# Patient Record
Sex: FEMALE | Race: WHITE | Employment: PART TIME | URBAN - METROPOLITAN AREA
[De-identification: names, ages, dates, MRNs, and addresses within clinical notes are randomized per-mention and may not be internally consistent; named-entity substitution may affect disease eponyms.]

---

## 2017-01-23 ENCOUNTER — HOSPITAL ENCOUNTER (OUTPATIENT)
Dept: DIABETES SERVICES | Age: 32
Discharge: HOME | End: 2017-01-23

## 2017-01-23 DIAGNOSIS — O24.410 DIET CONTROLLED GESTATIONAL DIABETES MELLITUS (GDM) IN THIRD TRIMESTER: Primary | ICD-10-CM

## 2017-01-23 RX ORDER — LANOLIN ALCOHOL/MO/W.PET/CERES
50 CREAM (GRAM) TOPICAL DAILY
Status: ON HOLD | COMMUNITY
End: 2017-04-16 | Stop reason: HOSPADM

## 2017-01-23 ASSESSMENT — PATIENT HEALTH QUESTIONNAIRE - PHQ9
2. FEELING DOWN, DEPRESSED OR HOPELESS: 0
SUM OF ALL RESPONSES TO PHQ9 QUESTIONS 1 & 2: 0
SUM OF ALL RESPONSES TO PHQ QUESTIONS 1-9: 0
1. LITTLE INTEREST OR PLEASURE IN DOING THINGS: 0

## 2017-02-06 ENCOUNTER — HOSPITAL ENCOUNTER (OUTPATIENT)
Dept: DIABETES SERVICES | Age: 32
Discharge: HOME | End: 2017-02-06

## 2017-02-06 DIAGNOSIS — O24.410 DIET CONTROLLED GESTATIONAL DIABETES MELLITUS (GDM) IN THIRD TRIMESTER: Primary | ICD-10-CM

## 2017-02-23 ENCOUNTER — HOSPITAL ENCOUNTER (OUTPATIENT)
Dept: OBGYN | Age: 32
Discharge: HOME | End: 2017-02-28
Admitting: OBSTETRICS & GYNECOLOGY

## 2017-03-13 ENCOUNTER — HOSPITAL ENCOUNTER (OUTPATIENT)
Dept: OBGYN | Age: 32
Discharge: HOME | End: 2017-03-31
Admitting: OBSTETRICS & GYNECOLOGY

## 2017-03-29 ENCOUNTER — HOSPITAL ENCOUNTER (OUTPATIENT)
Dept: ULTRASOUND IMAGING | Age: 32
Discharge: HOME | End: 2017-03-29
Attending: OBSTETRICS & GYNECOLOGY | Admitting: OBSTETRICS & GYNECOLOGY

## 2017-03-29 DIAGNOSIS — O09.93 SUPERVISION OF HIGH RISK PREGNANCY IN THIRD TRIMESTER: ICD-10-CM

## 2017-03-29 DIAGNOSIS — O24.410 GESTATIONAL DIABETES MELLITUS, CLASS A1: ICD-10-CM

## 2017-05-26 ENCOUNTER — NURSE ONLY (OUTPATIENT)
Dept: OBGYN CLINIC | Age: 32
End: 2017-05-26

## 2017-05-26 VITALS
DIASTOLIC BLOOD PRESSURE: 72 MMHG | HEART RATE: 82 BPM | SYSTOLIC BLOOD PRESSURE: 110 MMHG | BODY MASS INDEX: 43.38 KG/M2 | TEMPERATURE: 98.3 F | WEIGHT: 277 LBS

## 2017-05-26 DIAGNOSIS — Z98.891 S/P CESAREAN SECTION: Primary | ICD-10-CM

## 2017-05-26 PROCEDURE — 99024 POSTOP FOLLOW-UP VISIT: CPT | Performed by: OBSTETRICS & GYNECOLOGY

## 2017-05-26 RX ORDER — CEPHALEXIN 500 MG/1
500 CAPSULE ORAL 4 TIMES DAILY
Qty: 28 CAPSULE | Refills: 0 | Status: SHIPPED | OUTPATIENT
Start: 2017-05-26 | End: 2017-06-24 | Stop reason: ALTCHOICE

## 2017-07-17 ENCOUNTER — SURG/PROC ORDERS (OUTPATIENT)
Dept: SURGERY | Age: 32
End: 2017-07-17

## 2017-07-17 ENCOUNTER — INITIAL CONSULT (OUTPATIENT)
Dept: SURGERY | Age: 32
End: 2017-07-17

## 2017-07-17 VITALS
SYSTOLIC BLOOD PRESSURE: 120 MMHG | HEIGHT: 67 IN | BODY MASS INDEX: 43.16 KG/M2 | WEIGHT: 275 LBS | DIASTOLIC BLOOD PRESSURE: 72 MMHG

## 2017-07-17 DIAGNOSIS — K80.20 GALLSTONES: Primary | ICD-10-CM

## 2017-07-17 PROCEDURE — 99243 OFF/OP CNSLTJ NEW/EST LOW 30: CPT | Performed by: SURGERY

## 2017-07-17 RX ORDER — SODIUM CHLORIDE 0.9 % (FLUSH) 0.9 %
10 SYRINGE (ML) INJECTION PRN
Status: CANCELLED | OUTPATIENT
Start: 2017-07-17

## 2017-07-17 RX ORDER — SODIUM CHLORIDE 0.9 % (FLUSH) 0.9 %
10 SYRINGE (ML) INJECTION EVERY 12 HOURS SCHEDULED
Status: CANCELLED | OUTPATIENT
Start: 2017-07-17

## 2017-07-18 ENCOUNTER — TELEPHONE (OUTPATIENT)
Dept: SURGERY | Age: 32
End: 2017-07-18

## 2017-07-20 ASSESSMENT — ENCOUNTER SYMPTOMS: SHORTNESS OF BREATH: 0

## 2017-07-21 ENCOUNTER — HOSPITAL ENCOUNTER (OUTPATIENT)
Dept: SURGERY | Age: 32
Discharge: HOME | End: 2017-07-21
Attending: SURGERY | Admitting: SURGERY

## 2017-07-21 VITALS
RESPIRATION RATE: 16 BRPM | HEIGHT: 67 IN | BODY MASS INDEX: 43.16 KG/M2 | TEMPERATURE: 97 F | SYSTOLIC BLOOD PRESSURE: 121 MMHG | OXYGEN SATURATION: 100 % | WEIGHT: 275 LBS | HEART RATE: 75 BPM | DIASTOLIC BLOOD PRESSURE: 92 MMHG

## 2017-07-21 LAB — PREGNANCY, URINE: NEGATIVE

## 2017-07-21 PROCEDURE — 47562 LAPAROSCOPIC CHOLECYSTECTOMY: CPT | Performed by: SURGERY

## 2017-07-21 RX ORDER — SODIUM CHLORIDE 0.9 % (FLUSH) 0.9 %
10 SYRINGE (ML) INJECTION PRN
Status: DISCONTINUED | OUTPATIENT
Start: 2017-07-21 | End: 2017-07-22 | Stop reason: HOSPADM

## 2017-07-21 RX ORDER — ONDANSETRON 2 MG/ML
4 INJECTION INTRAMUSCULAR; INTRAVENOUS
Status: ACTIVE | OUTPATIENT
Start: 2017-07-21 | End: 2017-07-21

## 2017-07-21 RX ORDER — KETOROLAC TROMETHAMINE 30 MG/ML
30 INJECTION, SOLUTION INTRAMUSCULAR; INTRAVENOUS
Status: ACTIVE | OUTPATIENT
Start: 2017-07-21 | End: 2017-07-21

## 2017-07-21 RX ORDER — HYDROMORPHONE HCL 110MG/55ML
0.25 PATIENT CONTROLLED ANALGESIA SYRINGE INTRAVENOUS EVERY 5 MIN PRN
Status: DISCONTINUED | OUTPATIENT
Start: 2017-07-21 | End: 2017-07-22 | Stop reason: HOSPADM

## 2017-07-21 RX ORDER — LIDOCAINE HYDROCHLORIDE 10 MG/ML
1 INJECTION, SOLUTION EPIDURAL; INFILTRATION; INTRACAUDAL; PERINEURAL
Status: COMPLETED | OUTPATIENT
Start: 2017-07-21 | End: 2017-07-21

## 2017-07-21 RX ORDER — KETOROLAC TROMETHAMINE 30 MG/ML
INJECTION, SOLUTION INTRAMUSCULAR; INTRAVENOUS
Status: COMPLETED
Start: 2017-07-21 | End: 2017-07-21

## 2017-07-21 RX ORDER — OXYCODONE HYDROCHLORIDE 5 MG/1
5-10 TABLET ORAL EVERY 4 HOURS PRN
Qty: 30 TABLET | Refills: 0 | Status: ON HOLD | OUTPATIENT
Start: 2017-07-21 | End: 2017-08-04 | Stop reason: HOSPADM

## 2017-07-21 RX ORDER — KETOROLAC TROMETHAMINE 30 MG/ML
30 INJECTION, SOLUTION INTRAMUSCULAR; INTRAVENOUS ONCE
Status: COMPLETED | OUTPATIENT
Start: 2017-07-21 | End: 2017-07-21

## 2017-07-21 RX ORDER — SODIUM CHLORIDE, SODIUM LACTATE, POTASSIUM CHLORIDE, CALCIUM CHLORIDE 600; 310; 30; 20 MG/100ML; MG/100ML; MG/100ML; MG/100ML
INJECTION, SOLUTION INTRAVENOUS CONTINUOUS
Status: DISCONTINUED | OUTPATIENT
Start: 2017-07-21 | End: 2017-07-22 | Stop reason: HOSPADM

## 2017-07-21 RX ORDER — LABETALOL HYDROCHLORIDE 5 MG/ML
5 INJECTION, SOLUTION INTRAVENOUS EVERY 10 MIN PRN
Status: DISCONTINUED | OUTPATIENT
Start: 2017-07-21 | End: 2017-07-22 | Stop reason: HOSPADM

## 2017-07-21 RX ORDER — MEPERIDINE HYDROCHLORIDE 25 MG/ML
12.5 INJECTION INTRAMUSCULAR; INTRAVENOUS; SUBCUTANEOUS EVERY 5 MIN PRN
Status: DISCONTINUED | OUTPATIENT
Start: 2017-07-21 | End: 2017-07-22 | Stop reason: HOSPADM

## 2017-07-21 RX ORDER — MORPHINE SULFATE 4 MG/ML
2 INJECTION, SOLUTION INTRAMUSCULAR; INTRAVENOUS EVERY 5 MIN PRN
Status: DISCONTINUED | OUTPATIENT
Start: 2017-07-21 | End: 2017-07-22 | Stop reason: HOSPADM

## 2017-07-21 RX ORDER — HYDRALAZINE HYDROCHLORIDE 20 MG/ML
5 INJECTION INTRAMUSCULAR; INTRAVENOUS
Status: DISCONTINUED | OUTPATIENT
Start: 2017-07-21 | End: 2017-07-22 | Stop reason: HOSPADM

## 2017-07-21 RX ORDER — SODIUM CHLORIDE 0.9 % (FLUSH) 0.9 %
10 SYRINGE (ML) INJECTION EVERY 12 HOURS SCHEDULED
Status: DISCONTINUED | OUTPATIENT
Start: 2017-07-21 | End: 2017-07-22 | Stop reason: HOSPADM

## 2017-07-21 RX ORDER — MORPHINE SULFATE 4 MG/ML
1 INJECTION, SOLUTION INTRAMUSCULAR; INTRAVENOUS EVERY 5 MIN PRN
Status: DISCONTINUED | OUTPATIENT
Start: 2017-07-21 | End: 2017-07-22 | Stop reason: HOSPADM

## 2017-07-21 RX ORDER — DIPHENHYDRAMINE HYDROCHLORIDE 50 MG/ML
12.5 INJECTION INTRAMUSCULAR; INTRAVENOUS
Status: ACTIVE | OUTPATIENT
Start: 2017-07-21 | End: 2017-07-21

## 2017-07-21 RX ORDER — HYDROMORPHONE HCL 110MG/55ML
0.5 PATIENT CONTROLLED ANALGESIA SYRINGE INTRAVENOUS EVERY 5 MIN PRN
Status: COMPLETED | OUTPATIENT
Start: 2017-07-21 | End: 2017-07-21

## 2017-07-21 RX ORDER — OXYCODONE HYDROCHLORIDE AND ACETAMINOPHEN 5; 325 MG/1; MG/1
1 TABLET ORAL PRN
Status: COMPLETED | OUTPATIENT
Start: 2017-07-21 | End: 2017-07-21

## 2017-07-21 RX ORDER — OXYCODONE HYDROCHLORIDE AND ACETAMINOPHEN 5; 325 MG/1; MG/1
2 TABLET ORAL PRN
Status: COMPLETED | OUTPATIENT
Start: 2017-07-21 | End: 2017-07-21

## 2017-07-21 RX ADMIN — MORPHINE SULFATE 2 MG: 4 INJECTION, SOLUTION INTRAMUSCULAR; INTRAVENOUS at 12:13

## 2017-07-21 RX ADMIN — Medication 0.5 MG: at 11:44

## 2017-07-21 RX ADMIN — Medication 0.5 MG: at 11:54

## 2017-07-21 RX ADMIN — MORPHINE SULFATE 2 MG: 4 INJECTION, SOLUTION INTRAMUSCULAR; INTRAVENOUS at 12:06

## 2017-07-21 RX ADMIN — Medication 0.5 MG: at 11:39

## 2017-07-21 RX ADMIN — Medication 0.5 MG: at 12:00

## 2017-07-21 RX ADMIN — SODIUM CHLORIDE, SODIUM LACTATE, POTASSIUM CHLORIDE, CALCIUM CHLORIDE: 600; 310; 30; 20 INJECTION, SOLUTION INTRAVENOUS at 10:34

## 2017-07-21 RX ADMIN — OXYCODONE HYDROCHLORIDE AND ACETAMINOPHEN 2 TABLET: 5; 325 TABLET ORAL at 12:20

## 2017-07-21 RX ADMIN — KETOROLAC TROMETHAMINE 30 MG: 30 INJECTION, SOLUTION INTRAMUSCULAR; INTRAVENOUS at 12:44

## 2017-07-21 RX ADMIN — LIDOCAINE HYDROCHLORIDE 0.1 ML: 10 INJECTION, SOLUTION EPIDURAL; INFILTRATION; INTRACAUDAL; PERINEURAL at 10:34

## 2017-07-21 ASSESSMENT — PAIN DESCRIPTION - PAIN TYPE
TYPE: SURGICAL PAIN

## 2017-07-21 ASSESSMENT — PAIN SCALES - GENERAL
PAINLEVEL_OUTOF10: 8
PAINLEVEL_OUTOF10: 8
PAINLEVEL_OUTOF10: 3
PAINLEVEL_OUTOF10: 8
PAINLEVEL_OUTOF10: 4
PAINLEVEL_OUTOF10: 8
PAINLEVEL_OUTOF10: 8
PAINLEVEL_OUTOF10: 7
PAINLEVEL_OUTOF10: 8
PAINLEVEL_OUTOF10: 7
PAINLEVEL_OUTOF10: 4

## 2017-07-21 ASSESSMENT — PAIN DESCRIPTION - ONSET: ONSET: GRADUAL

## 2017-07-21 ASSESSMENT — PAIN DESCRIPTION - PROGRESSION
CLINICAL_PROGRESSION: GRADUALLY IMPROVING

## 2017-07-21 ASSESSMENT — PAIN DESCRIPTION - FREQUENCY
FREQUENCY: INTERMITTENT

## 2017-07-21 ASSESSMENT — PAIN DESCRIPTION - DESCRIPTORS
DESCRIPTORS: ACHING;BURNING

## 2017-07-21 ASSESSMENT — PAIN - FUNCTIONAL ASSESSMENT: PAIN_FUNCTIONAL_ASSESSMENT: 0-10

## 2017-07-21 ASSESSMENT — PAIN DESCRIPTION - LOCATION
LOCATION: ABDOMEN
LOCATION: ABDOMEN

## 2017-07-31 ENCOUNTER — TELEPHONE (OUTPATIENT)
Dept: SURGERY | Age: 32
End: 2017-07-31

## 2017-08-08 ENCOUNTER — HOSPITAL ENCOUNTER (OUTPATIENT)
Dept: OTHER | Age: 32
Discharge: HOME | End: 2017-08-08
Attending: SURGERY | Admitting: SURGERY

## 2017-08-08 ENCOUNTER — OFFICE VISIT (OUTPATIENT)
Dept: SURGERY | Age: 32
End: 2017-08-08

## 2017-08-08 VITALS
BODY MASS INDEX: 42.22 KG/M2 | SYSTOLIC BLOOD PRESSURE: 122 MMHG | WEIGHT: 269 LBS | HEIGHT: 67 IN | DIASTOLIC BLOOD PRESSURE: 74 MMHG

## 2017-08-08 DIAGNOSIS — R79.89 ABNORMAL LFTS: ICD-10-CM

## 2017-08-08 DIAGNOSIS — Z98.890 POST-OPERATIVE STATE: Primary | ICD-10-CM

## 2017-08-08 LAB
ALBUMIN SERPL-MCNC: 4.3 G/DL (ref 3.4–5)
ALP BLD-CCNC: 253 U/L (ref 40–129)
ALT SERPL-CCNC: 93 U/L (ref 10–40)
AST SERPL-CCNC: 27 U/L (ref 15–37)
BILIRUB SERPL-MCNC: 1.1 MG/DL (ref 0–1)
BILIRUBIN DIRECT: 0.5 MG/DL (ref 0–0.3)
BILIRUBIN, INDIRECT: 0.6 MG/DL (ref 0–1)
TOTAL PROTEIN: 7.8 G/DL (ref 6.4–8.2)

## 2017-08-08 PROCEDURE — 99024 POSTOP FOLLOW-UP VISIT: CPT | Performed by: SURGERY

## 2017-08-10 ENCOUNTER — TELEPHONE (OUTPATIENT)
Dept: SURGERY | Age: 32
End: 2017-08-10

## 2018-11-12 ENCOUNTER — OFFICE VISIT (OUTPATIENT)
Dept: ORTHOPEDIC SURGERY | Age: 33
End: 2018-11-12
Payer: COMMERCIAL

## 2018-11-12 VITALS
HEART RATE: 88 BPM | BODY MASS INDEX: 42.44 KG/M2 | SYSTOLIC BLOOD PRESSURE: 115 MMHG | HEIGHT: 68 IN | DIASTOLIC BLOOD PRESSURE: 71 MMHG | WEIGHT: 280 LBS

## 2018-11-12 DIAGNOSIS — M65.4 TENOSYNOVITIS, DE QUERVAIN: ICD-10-CM

## 2018-11-12 DIAGNOSIS — M25.532 LEFT WRIST PAIN: Primary | ICD-10-CM

## 2018-11-12 PROCEDURE — L3908 WHO COCK-UP NONMOLDE PRE OTS: HCPCS | Performed by: PHYSICIAN ASSISTANT

## 2018-11-12 PROCEDURE — 99202 OFFICE O/P NEW SF 15 MIN: CPT | Performed by: PHYSICIAN ASSISTANT

## 2018-11-12 RX ORDER — ESCITALOPRAM OXALATE 10 MG/1
10 TABLET ORAL DAILY
COMMUNITY

## 2018-11-20 ENCOUNTER — OFFICE VISIT (OUTPATIENT)
Dept: ORTHOPEDIC SURGERY | Age: 33
End: 2018-11-20
Payer: COMMERCIAL

## 2018-11-20 VITALS — WEIGHT: 279.98 LBS | BODY MASS INDEX: 42.43 KG/M2 | HEIGHT: 68 IN

## 2018-11-20 DIAGNOSIS — M65.4 DE QUERVAIN'S DISEASE (RADIAL STYLOID TENOSYNOVITIS): Primary | ICD-10-CM

## 2018-11-20 PROCEDURE — 20551 NJX 1 TENDON ORIGIN/INSJ: CPT | Performed by: ORTHOPAEDIC SURGERY

## 2018-11-20 PROCEDURE — 99243 OFF/OP CNSLTJ NEW/EST LOW 30: CPT | Performed by: ORTHOPAEDIC SURGERY

## 2018-11-20 NOTE — PROGRESS NOTES
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 Patient developed tenderness over the dorsal radial thumb and wrist approximately 10 days. she has not had any specific traumatic injury or numbness and tingling. .Pain is persistent, typically moderate in intensity, and is exacerbated by with thumb extension or pinch. No triggering of the thumb. Pain causes weakness. No numbness of the fingers. Patient was seen in after hours clinic. Appropriately given a brace.   A specialty hand and upper extremity consultation was requested by my colleague DONITA Craig for evaluation treatment of left wrist pain    Medical History:  Past Medical History:   Diagnosis Date    Abnormal Pap smear of cervix     Anemia     Anxiety     Crohn's colitis ([facility] [address]     Depression     Gestational diabetes     Herniated disc     SAB (spontaneous )     x2     Past Surgical History:   Procedure Laterality Date     SECTION      CHOLECYSTECTOMY, LAPAROSCOPIC N/A 2017    ECTOPIC PREGNANCY SURGERY      ECTOPIC PREGNANCY SURGERY  2008    ERCP       Family History   Problem Relation Age of Onset    Arthritis Father     High Blood Pressure Father     Diabetes Paternal Aunt     Mental Retardation Paternal Aunt     Atrial Fibrillation Maternal Grandmother     High Blood Pressure Maternal Grandmother     Obesity Maternal Grandmother     Stroke Maternal Grandmother     Early Death Maternal Grandfather     Heart Disease Maternal Grandfather     High Blood Pressure Maternal Grandfather     Obesity Maternal Grandfather     Depression Paternal Grandmother     Heart Disease Paternal Grandmother     High Blood Pressure Paternal Grandmother     Kidney Disease

## 2019-03-07 ENCOUNTER — HOSPITAL ENCOUNTER (EMERGENCY)
Age: 34
Discharge: HOME | End: 2019-03-07
Payer: COMMERCIAL

## 2019-03-07 ENCOUNTER — APPOINTMENT (OUTPATIENT)
Dept: GENERAL RADIOLOGY | Age: 34
End: 2019-03-07
Payer: COMMERCIAL

## 2019-03-07 ENCOUNTER — NURSE TRIAGE (OUTPATIENT)
Dept: OTHER | Facility: CLINIC | Age: 34
End: 2019-03-07

## 2019-03-07 VITALS
SYSTOLIC BLOOD PRESSURE: 115 MMHG | WEIGHT: 275 LBS | HEIGHT: 67 IN | HEART RATE: 81 BPM | RESPIRATION RATE: 16 BRPM | TEMPERATURE: 97.2 F | BODY MASS INDEX: 43.16 KG/M2 | DIASTOLIC BLOOD PRESSURE: 68 MMHG | OXYGEN SATURATION: 99 %

## 2019-03-07 DIAGNOSIS — R07.81 RIB PAIN ON RIGHT SIDE: Primary | ICD-10-CM

## 2019-03-07 DIAGNOSIS — S40.021A CONTUSION OF MULTIPLE SITES OF RIGHT SHOULDER AND UPPER ARM, INITIAL ENCOUNTER: ICD-10-CM

## 2019-03-07 DIAGNOSIS — S40.011A CONTUSION OF MULTIPLE SITES OF RIGHT SHOULDER AND UPPER ARM, INITIAL ENCOUNTER: ICD-10-CM

## 2019-03-07 PROCEDURE — 99283 EMERGENCY DEPT VISIT LOW MDM: CPT

## 2019-03-07 PROCEDURE — 6370000000 HC RX 637 (ALT 250 FOR IP): Performed by: NURSE PRACTITIONER

## 2019-03-07 PROCEDURE — 73030 X-RAY EXAM OF SHOULDER: CPT

## 2019-03-07 RX ORDER — DIAZEPAM 5 MG/1
5 TABLET ORAL ONCE
Status: COMPLETED | OUTPATIENT
Start: 2019-03-07 | End: 2019-03-07

## 2019-03-07 RX ORDER — IBUPROFEN 800 MG/1
800 TABLET ORAL ONCE
Status: COMPLETED | OUTPATIENT
Start: 2019-03-07 | End: 2019-03-07

## 2019-03-07 RX ADMIN — DIAZEPAM 5 MG: 5 TABLET ORAL at 21:37

## 2019-03-07 RX ADMIN — IBUPROFEN 800 MG: 800 TABLET, FILM COATED ORAL at 21:01

## 2019-03-07 ASSESSMENT — PAIN SCALES - GENERAL
PAINLEVEL_OUTOF10: 5
PAINLEVEL_OUTOF10: 10

## 2019-03-11 ENCOUNTER — OFFICE VISIT (OUTPATIENT)
Dept: ORTHOPEDIC SURGERY | Age: 34
End: 2019-03-11
Payer: COMMERCIAL

## 2019-03-11 VITALS
SYSTOLIC BLOOD PRESSURE: 124 MMHG | HEART RATE: 78 BPM | DIASTOLIC BLOOD PRESSURE: 74 MMHG | HEIGHT: 67 IN | WEIGHT: 274.91 LBS | BODY MASS INDEX: 43.15 KG/M2

## 2019-03-11 DIAGNOSIS — S40.011A CONTUSION OF RIGHT SHOULDER, INITIAL ENCOUNTER: ICD-10-CM

## 2019-03-11 DIAGNOSIS — M25.511 ACUTE PAIN OF RIGHT SHOULDER: Primary | ICD-10-CM

## 2019-03-11 PROCEDURE — 99203 OFFICE O/P NEW LOW 30 MIN: CPT | Performed by: ORTHOPAEDIC SURGERY

## 2019-03-11 RX ORDER — PYRIDOXINE HCL (VITAMIN B6) 50 MG
TABLET ORAL
COMMUNITY
End: 2019-03-11

## 2019-03-11 RX ORDER — LORATADINE 10 MG/1
10 CAPSULE, LIQUID FILLED ORAL DAILY
COMMUNITY
Start: 2016-12-12

## 2019-03-13 ENCOUNTER — TELEPHONE (OUTPATIENT)
Dept: ORTHOPEDIC SURGERY | Age: 34
End: 2019-03-13

## 2019-04-09 ENCOUNTER — OFFICE VISIT (OUTPATIENT)
Dept: ORTHOPEDIC SURGERY | Age: 34
End: 2019-04-09
Payer: COMMERCIAL

## 2019-04-09 VITALS — HEIGHT: 67 IN | WEIGHT: 274.91 LBS | BODY MASS INDEX: 43.15 KG/M2

## 2019-04-09 DIAGNOSIS — M25.511 ACUTE PAIN OF RIGHT SHOULDER: Primary | ICD-10-CM

## 2019-04-09 PROCEDURE — 99213 OFFICE O/P EST LOW 20 MIN: CPT | Performed by: ORTHOPAEDIC SURGERY

## 2020-02-13 ENCOUNTER — OFFICE VISIT (OUTPATIENT)
Dept: ORTHOPEDIC SURGERY | Age: 35
End: 2020-02-13
Payer: COMMERCIAL

## 2020-02-13 VITALS — HEIGHT: 67 IN | WEIGHT: 274.91 LBS | BODY MASS INDEX: 43.15 KG/M2

## 2020-02-13 PROCEDURE — 99213 OFFICE O/P EST LOW 20 MIN: CPT | Performed by: PHYSICIAN ASSISTANT

## 2020-02-13 PROCEDURE — L4361 PNEUMA/VAC WALK BOOT PRE OTS: HCPCS | Performed by: PHYSICIAN ASSISTANT

## 2020-02-14 ENCOUNTER — TELEPHONE (OUTPATIENT)
Dept: ORTHOPEDIC SURGERY | Age: 35
End: 2020-02-14

## 2020-02-14 NOTE — PROGRESS NOTES
visit. Medical History:   Past Medical History:   Diagnosis Date    Abnormal Pap smear of cervix     Anemia     Anxiety     Crohn's colitis ([address]     Depression     Gestational diabetes     Herniated disc     SAB (spontaneous )     x2     Allergies: Allergies   Allergen Reactions    Doxycycline Diarrhea     Problem List:    Patient Active Problem List   Diagnosis    Pneumonia    SOB (shortness of breath)    Anxiety disorder    Calculus of gallbladder with chronic cholecystitis without obstruction    Abnormal LFTs    Acute hepatitis    Tenosynovitis, de Quervain       Review of Systems:  All systems were reviewed on 2020 and were negative except as indicated on the ROS form attached to this encounter (or located in the Media tab). Vital Signs:  Ht 5' 7.01\" (1.702 m)   Wt 274 lb 14.6 oz (124.7 kg)   BMI 43.05 kg/m²     General Exam:  Constitutional: Patient is adequately groomed with no evidence of malnutrition  Mental Status: The patient is oriented to time, place and person. The patient's mood and affect are appropriate. Neurological: The patient has good coordination. There is no weakness or sensory deficit. Left heel/ankle Examination:   Inspection: Today's inspection of the left heel and ankle reveals the skin to be intact with no obvious swelling or erythema. Palpation: She is diffusely tender to palpation over the mid substance of the Achilles into the insertion at the calcaneus    Range of motion: She is able to fully plantarflex, rakel, invert, but dorsiflexion increases her pain. She is only able to dorsiflex to neutral.    Strength: Strength is decreased to resistive extension and flexion    Special tests:  There are no palpable defects and's are negative Gardiner's    Skin: There are no rashes, ulcerations or lesions    Gait: With a limp favoring the left lower extremity    Distal neurovascular is grossly intact  Radiology:  X-rays obtained and reviewed in office:  Views: AP, lateral, oblique left ankle  Location(s): Left ankle  Impression: There are no acute or subacute fractures noted and there is minimal calcaneal spurring noted    Assessment:  Left Achilles tendinitis    Impression:   Encounter Diagnoses   Name Primary?  Pain     Achilles tendinitis of left lower extremity Yes       Office Procedures:  Orders Placed This Encounter   Procedures    XR ANKLE LEFT (MIN 3 VIEWS)     Standing Status:   Future     Number of Occurrences:   1     Standing Expiration Date:   3/13/2020    OTS FP Pneumatic Walking Boot Tall DJO     Patient was prescribed a Moore Getting Tall Walking Boot. The left ANKLE will require stabilization / immobilization from this semi-rigid / rigid orthosis to improve their function. The orthosis will assist in protecting the affected area, provide functional support and facilitate healing. The patient was educated and fit by a healthcare professional with expert knowledge and specialization in brace application while under the direct supervision of the physician. Verbal and written instructions for the use of and application of this item were provided. They were instructed to contact the office immediately should the brace result in increased pain, decreased sensation, increased swelling or worsening of the condition. Treatment Plan:  Patient was placed into a tall pneumatic walking boot that she is to wear with all her weightbearing activity. She was given heel cord stretching exercises that should be performed 2-3 times a day. She is also to take ibuprofen 800 mg 2-3 times a day with food. He will follow-up with Dr. Wilmar Hyde in 2 weeks. Jcarlos Adams PA-C    * Please note that some or all of this record was generated using voice recognition software. If there are any questions about the content of this document, please contact me as some errors in transcription may have occurred.

## 2020-02-27 ENCOUNTER — OFFICE VISIT (OUTPATIENT)
Dept: ORTHOPEDIC SURGERY | Age: 35
End: 2020-02-27
Payer: COMMERCIAL

## 2020-02-27 VITALS
DIASTOLIC BLOOD PRESSURE: 75 MMHG | HEIGHT: 67 IN | SYSTOLIC BLOOD PRESSURE: 125 MMHG | WEIGHT: 274.91 LBS | BODY MASS INDEX: 43.15 KG/M2 | HEART RATE: 75 BPM

## 2020-02-27 PROCEDURE — 99203 OFFICE O/P NEW LOW 30 MIN: CPT | Performed by: PODIATRIST

## 2020-02-27 NOTE — PROGRESS NOTES
HISTORY OF PRESENT ILLNESS:  This is an initial visit for a 79-year-old female with a chief complaint of pain to the left Achilles tendon. She began having pain around Thanksgiving of last year. About a month ago she was moving some furniture when the pain became much more consistent. She cannot recall any direct injury. The pain is sharp but can be dull at times with both activity and direct pressure. The only real relief is experienced with rest and taking off shoes. FAMILY HISTORY: Documented in chart. SOCIAL HISTORY: Documented in chart. REVIEW OF SYSTEMS: The patient denies any fever, chills, or night sweats. The patient also denies developing any type of rash. The patient denies any problems with cardiovascular, pulmonary, gastrointestinal, neurologic, urologic, genitourinary, psychiatric, dermatologic, and HEENT systems. Family History, Social History, and Review of Systems were reviewed from patient history form dated on 2/27/2020 and available in the patient's chart under the MEDIA tab. PHYSICAL EXAM:  The patient is alert and orientated x3. The majority of the palpable tenderness is directly over the Achilles tendon, just above the left heel. There is focal edema noted in this area. There is no pain to the right heel. There is a small prominence at the posterior aspect of the left heel with some mild erythema. There is minimal pain of the Achilles tendon insertion itself. There is no palpable deficit and Gardiner's test is negative for a complete rupture. There is full-strength with plantar flexion at the ankle. Pedal pulses are palpable, bilateral.  The sensation is grossly intact, bilateral.    X-RAYS: 3 nonweightbearing x-ray views of the left ankle were reviewed. These demonstrate a prominent posterior heel spur. No acute fracture or periosteal reaction is noted. ASSESSMENT:  Achilles Tendinitis and Tenosynovitis, left.       PLAN:  I educated the patient on the pathology and its treatment options. I advised her to use the boot full-time. Work restrictions were given. Walking will be allowed as tolerated but overall activity should be decreased. The importance of rest in this condition was emphasized. The chronic and recurrent nature of the painful episodes with this condition was discussed. Both short-term and long-term treatments were discussed. At this point, I only recommend a trial of conservative treatment and the patient agrees with the treatment plan. I will see them back in approximately 3 weeks.

## 2020-02-27 NOTE — LETTER
[facility] [id number]  Phone: [phone]  Fax: [phone]    Rishabh Shu        February 27, 2020     Patient: Francheska Xavier   YOB: 1985   Date of Visit: 2/27/2020       To Whom It May Concern: It is my medical opinion that Triston Kay may return to work on 2/27/20. Must wear boot for 3 weeks. If you have any questions or concerns, please don't hesitate to call.     Sincerely,        Janee Wilson DPM

## 2020-03-17 ENCOUNTER — OFFICE VISIT (OUTPATIENT)
Dept: ORTHOPEDIC SURGERY | Age: 35
End: 2020-03-17
Payer: COMMERCIAL

## 2020-03-17 PROCEDURE — 99213 OFFICE O/P EST LOW 20 MIN: CPT | Performed by: PODIATRIST

## 2020-03-17 RX ORDER — DEXAMETHASONE SODIUM PHOSPHATE 4 MG/ML
INJECTION, SOLUTION INTRA-ARTICULAR; INTRALESIONAL; INTRAMUSCULAR; INTRAVENOUS; SOFT TISSUE
Qty: 30 ML | Refills: 0 | Status: SHIPPED | OUTPATIENT
Start: 2020-03-17 | End: 2020-09-23

## 2020-03-17 NOTE — PROGRESS NOTES
HISTORY OF PRESENT ILLNESS:  This is a return visit for a patient with a chief complaint of left Achilles tendinitis. Unfortunately over the last few weeks she became ill and was not able to use the boot consistently. She continues having pain to the back of her left ankle with activity. PHYSICAL EXAM:  The majority of the palpable tenderness remains at the posterior aspect of the left ankle over the Achilles tendon. There is focal edema over the Achilles tendon. There is no erythema, ecchymosis, or edema otherwise. There is minimal pain of the Achilles tendon insertion itself. There is no palpable deficit and Gardiner's test is negative for a complete rupture. There is full-strength with plantar flexion at the ankle. This is symmetrical.    Pedal pulses are palpable, bilateral.  The sensation is grossly intact, bilateral.      ASSESSMENT: Achilles Tendinitis and Tenosynovitis, left      PLAN: she will continue with the boot full-time for the next 2 weeks. I prescribed physical therapy to rehab the tendon. This will include iontophoresis with dexamethasone phosphate. I will see her back in 4 weeks.

## 2020-03-19 ENCOUNTER — HOSPITAL ENCOUNTER (OUTPATIENT)
Dept: PHYSICAL THERAPY | Age: 35
Setting detail: THERAPIES SERIES
Discharge: HOME | End: 2020-03-19
Payer: COMMERCIAL

## 2020-03-19 PROCEDURE — 97161 PT EVAL LOW COMPLEX 20 MIN: CPT

## 2020-03-19 PROCEDURE — 97033 APP MDLTY 1+IONTPHRSIS EA 15: CPT

## 2020-03-19 PROCEDURE — 97110 THERAPEUTIC EXERCISES: CPT

## 2020-03-19 NOTE — FLOWSHEET NOTE
Standing gastroc stretch 30\"x3  HEP   Standing Soleus stretch 30\"x3  HEP   Seated heel raises 15x2  HEP         Pt education on use of heel lift 3'                 Pt education on current condition, POC, expectations for therapy, and HEP. 10'                 Manual Intervention (31905)                                          NMR re-education (66271)   CUES NEEDED                                                         Therapeutic Activity (12191)                                                Therapeutic Exercise and NMR EXR  [x] (22958) Provided verbal/tactile cueing for activities related to strengthening, flexibility, endurance, ROM for improvements in LE, proximal hip, and core control with self care, mobility, lifting, ambulation.  [] (79001) Provided verbal/tactile cueing for activities related to improving balance, coordination, kinesthetic sense, posture, motor skill, proprioception  to assist with LE, proximal hip, and core control in self care, mobility, lifting, ambulation and eccentric single leg control.      NMR and Therapeutic Activities:    [] (30497 or 15963) Provided verbal/tactile cueing for activities related to improving balance, coordination, kinesthetic sense, posture, motor skill, proprioception and motor activation to allow for proper function of core, proximal hip and LE with self care and ADLs  [] (70766) Gait Re-education- Provided training and instruction to the patient for proper LE, core and proximal hip recruitment and positioning and eccentric body weight control with ambulation re-education including up and down stairs     Home Exercise Program:    [x] (52951) Reviewed/Progressed HEP activities related to strengthening, flexibility, endurance, ROM of core, proximal hip and LE for functional self-care, mobility, lifting and ambulation/stair navigation   [] (70208)Reviewed/Progressed HEP activities related to improving balance, coordination, kinesthetic sense, posture, motor skill, []? Progressing: []? Met: []? Not Met: []? Adjusted  2. Patient will demonstrate increased AROM to 10 degrees of DF to allow for proper joint functioning as indicated by patients Functional Deficits. []? Progressing: []? Met: []? Not Met: []? Adjusted  3. Patient will demonstrate an increase in Strength of L plantarflexors to 5/5 to allow for proper functional mobility as indicated by patients Functional Deficits. []? Progressing: []? Met: []? Not Met: []? Adjusted  4. Patient will return to all functional activities without increased symptoms or restriction. []? Progressing: []? Met: []? Not Met: []? Adjusted  5. Patient will be able to negotiate stairs without L achilles pain.(patient specific functional goal)    []? Progressing: []? Met: []? Not Met: []? Adjusted             Progression Towards Functional goals:  [] Patient is progressing as expected towards functional goals listed. [] Progression is slowed due to complexities listed. [] Progression has been slowed due to co-morbidities. [x] Plan just implemented, too soon to assess goals progression  [] Other:         Overall Progression Towards Functional goals/ Treatment Progress Update:  [] Patient is progressing as expected towards functional goals listed. [] Progression is slowed due to complexities/Impairments listed. [] Progression has been slowed due to co-morbidities.   [x] Plan just implemented, too soon to assess goals progression <30days   [] Goals require adjustment due to lack of progress  [] Patient is not progressing as expected and requires additional follow up with physician  [] Other    Prognosis for POC: [x] Good [] Fair  [] Poor      Patient requires continued skilled intervention: [x] Yes  [] No    Treatment/Activity Tolerance:  [x] Patient able to complete treatment  [] Patient limited by fatigue  [] Patient limited by pain    [] Patient limited by other medical complications  [] Other:     ASSESSMENT: See eval. PT educated patient on current condition, POC, expectations for therapy, and HEP. PT also discussed with patient use of Left heel lift as an alternative option to her boot. Iontophoresis with dexamethasone was applied today and patient was educated on use of iontophoresis and expectations. Due to the current healthcare climate PT discussed use of Telehealth for follow-up as well as updating of her HEP via [address] [place] [address]. Return to Play: (if applicable)   []  Stage 1: Intro to Strength   []  Stage 2: Return to Run and Strength   []  Stage 3: Return to Jump and Strength   []  Stage 4: Dynamic Strength and Agility   []  Stage 5: Sport Specific Training     []  Ready to Return to Play, Meets All Above Stages   []  Not Ready for Return to Sports   Comments:                               PLAN: See eval  [] Continue per plan of care [] Alter current plan (see comments above)  [x] Plan of care initiated [] Hold pending MD visit [] Discharge      Electronically signed by:  Felicia Molina, PT, DPT [id number]    Note: If patient does not return for scheduled/ recommended follow up visits, this note will serve as a discharge from care along with most recent update on progress.
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
## 2020-03-23 NOTE — FLOWSHEET NOTE
[facility], [address]        Physical Therapy: COVID-19    Patient Name:  Darlene Rao  :  1985   Date:  3/23/2020      On 3/19/2020 PT discussed with patient scheduling changes due to new COVID-19 guidelines. Patient was educated on use of TeleHealth and/or phone sessions for her continued PT care in order to ensure proper HEP performance and safe progression with rehabilitation.      [facility]rock Advisor Access Code:  [facility]    Electronically signed by:  Fortunato Randhawa, PT, DPT [id number]

## 2020-04-03 ENCOUNTER — TELEPHONE (OUTPATIENT)
Dept: PHYSICAL THERAPY | Age: 35
End: 2020-04-03

## 2020-04-03 NOTE — TELEPHONE ENCOUNTER
PT spoke with patient to follow-up from her initial evaluation. She notes that her symptoms are relatively the same though she did have relief from the iontophoresis as well as her home exercises given to her at her initial visit. Patient is intermittently performing her HEP & has trialed heel inserts purchased online with little relief. She admits she may not be utilizing the heel inserts correctly. 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 PT discussed with patient TeleHealth options. Patient noted she will continue her exercises for 1 more week and then explore TeleHealth options if no improvement is seen.

## 2020-08-13 LAB
ALBUMIN SERPL-MCNC: 4.1 G/DL
ALP BLD-CCNC: 91 U/L
ALT SERPL-CCNC: 17 U/L
ANION GAP SERPL CALCULATED.3IONS-SCNC: 9 MMOL/L
AST SERPL-CCNC: 15 U/L
BASOPHILS ABSOLUTE: 0.1 /ΜL
BASOPHILS RELATIVE PERCENT: 0.9 %
BILIRUB SERPL-MCNC: 0.4 MG/DL (ref 0.1–1.4)
BUN BLDV-MCNC: 10 MG/DL
CALCIUM SERPL-MCNC: 9.3 MG/DL
CHLORIDE BLD-SCNC: 106 MMOL/L
CHOLESTEROL, TOTAL: 190 MG/DL
CHOLESTEROL/HDL RATIO: 3.3
CO2: 24 MMOL/L
CREAT SERPL-MCNC: 0.75 MG/DL
EOSINOPHILS ABSOLUTE: 0.3 /ΜL
EOSINOPHILS RELATIVE PERCENT: 3.8 %
FOLATE: >80
GFR CALCULATED: 89
GLUCOSE BLD-MCNC: 97 MG/DL
HCT VFR BLD CALC: 38.2 % (ref 36–46)
HDLC SERPL-MCNC: 57 MG/DL (ref 35–70)
HEMOGLOBIN: 12.9 G/DL (ref 12–16)
IRON: 46
LDL CHOLESTEROL CALCULATED: 112 MG/DL (ref 0–160)
LYMPHOCYTES ABSOLUTE: 2.1 /ΜL
LYMPHOCYTES RELATIVE PERCENT: 24.5 %
MCH RBC QN AUTO: 27.8 PG
MCHC RBC AUTO-ENTMCNC: NORMAL G/DL
MCV RBC AUTO: 82.3 FL
MONOCYTES ABSOLUTE: 0.5 /ΜL
MONOCYTES RELATIVE PERCENT: 5.3 %
NEUTROPHILS ABSOLUTE: 5.6 /ΜL
NEUTROPHILS RELATIVE PERCENT: 65.5 %
NONHDLC SERPL-MCNC: NORMAL MG/DL
PLATELET # BLD: 250 K/ΜL
PMV BLD AUTO: 7 FL
POTASSIUM SERPL-SCNC: 3.9 MMOL/L
RBC # BLD: NORMAL 10*6/UL
SODIUM BLD-SCNC: 139 MMOL/L
TOTAL PROTEIN: 7
TRIGL SERPL-MCNC: 103 MG/DL
VITAMIN B-12: 524
VITAMIN D 25-HYDROXY: 24
VITAMIN D2, 25 HYDROXY: NORMAL
VITAMIN D3,25 HYDROXY: NORMAL
VLDLC SERPL CALC-MCNC: NORMAL MG/DL
WBC # BLD: 8.6 10^3/ML

## 2020-09-23 ENCOUNTER — TELEPHONE (OUTPATIENT)
Dept: PHARMACY | Age: 35
End: 2020-09-23

## 2020-09-23 RX ORDER — INFLIXIMAB 100 MG/10ML
5 INJECTION, POWDER, LYOPHILIZED, FOR SOLUTION INTRAVENOUS SEE ADMIN INSTRUCTIONS
COMMUNITY
End: 2020-09-28 | Stop reason: SDUPTHER

## 2020-09-23 RX ORDER — FERROUS SULFATE 325(65) MG
325 TABLET ORAL
COMMUNITY

## 2020-09-23 RX ORDER — DESVENLAFAXINE 50 MG/1
50 TABLET, EXTENDED RELEASE ORAL DAILY
COMMUNITY

## 2020-09-23 RX ORDER — IBUPROFEN 200 MG
200 TABLET ORAL EVERY 6 HOURS PRN
COMMUNITY
End: 2022-09-29

## 2020-09-23 RX ORDER — PREDNISONE 20 MG/1
20 TABLET ORAL DAILY
COMMUNITY
End: 2020-11-25 | Stop reason: ALTCHOICE

## 2020-09-23 ASSESSMENT — PROMIS GLOBAL HEALTH SCALE
IN THE PAST 7 DAYS, HOW OFTEN HAVE YOU BEEN BOTHERED BY EMOTIONAL PROBLEMS, SUCH AS FEELING ANXIOUS, DEPRESSED, OR IRRITABLE [ON A SCALE FROM 1 (NEVER) TO 5 (ALWAYS)]?: 2
IN GENERAL, HOW WOULD YOU RATE YOUR SATISFACTION WITH YOUR SOCIAL ACTIVITIES AND RELATIONSHIPS [ON A SCALE OF 1 (POOR) TO 5 (EXCELLENT)]?: 4
IN GENERAL, WOULD YOU SAY YOUR HEALTH IS...[ON A SCALE OF 1 (POOR) TO 5 (EXCELLENT)]: 3
TO WHAT EXTENT ARE YOU ABLE TO CARRY OUT YOUR EVERYDAY PHYSICAL ACTIVITIES SUCH AS WALKING, CLIMBING STAIRS, CARRYING GROCERIES, OR MOVING A CHAIR [ON A SCALE OF 1 (NOT AT ALL) TO 5 (COMPLETELY)]?: 4
IN GENERAL, PLEASE RATE HOW WELL YOU CARRY OUT YOUR USUAL SOCIAL ACTIVITIES (INCLUDES ACTIVITIES AT HOME, AT WORK, AND IN YOUR COMMUNITY, AND RESPONSIBILITIES AS A PARENT, CHILD, SPOUSE, EMPLOYEE, FRIEND, ETC) [ON A SCALE OF 1 (POOR) TO 5 (EXCELLENT)]?: 4
IN THE PAST 7 DAYS, HOW WOULD YOU RATE YOUR PAIN ON AVERAGE [ON A SCALE FROM 0 (NO PAIN) TO 10 (WORST IMAGINABLE PAIN)]?: 2
HOW IS THE PROMIS V1.1 BEING ADMINISTERED?: 2
WHO IS THE PERSON COMPLETING THE PROMIS V1.1 SURVEY?: 0
IN GENERAL, HOW WOULD YOU RATE YOUR PHYSICAL HEALTH [ON A SCALE OF 1 (POOR) TO 5 (EXCELLENT)]?: 2
SUM OF RESPONSES TO QUESTIONS 3, 6, 7, & 8: 11
SUM OF RESPONSES TO QUESTIONS 2, 4, 5, & 10: 12
IN GENERAL, HOW WOULD YOU RATE YOUR MENTAL HEALTH, INCLUDING YOUR MOOD AND YOUR ABILITY TO THINK [ON A SCALE OF 1 (POOR) TO 5 (EXCELLENT)]?: 3
IN THE PAST 7 DAYS, HOW WOULD YOU RATE YOUR FATIGUE ON AVERAGE [ON A SCALE FROM 1 (NONE) TO 5 (VERY SEVERE)]?: 3
IN GENERAL, WOULD YOU SAY YOUR QUALITY OF LIFE IS...[ON A SCALE OF 1 (POOR) TO 5 (EXCELLENT)]: 3

## 2020-09-23 NOTE — TELEPHONE ENCOUNTER
CLINICAL PHARMACY NOTE - SPECIALTY MEDICATION SERVICE     Cb Cornelius is a 37 y.o. female presenting today for   Chief Complaint   Patient presents with    Medication Management     Remicade      Initial visit completed telephonically due to COVID-19. Allergies   Allergen Reactions    Doxycycline Diarrhea       Past Medical History:   Diagnosis Date    Abnormal Pap smear of cervix     Anemia     Anxiety     Crohn's colitis ([address]     Depression     Gestational diabetes     Herniated disc     SAB (spontaneous )     x2      Social History     Tobacco Use    Smoking status: Never Smoker    Smokeless tobacco: Never Used   Substance Use Topics    Alcohol use: No     Family History   Problem Relation Age of Onset    Arthritis Father     High Blood Pressure Father     Diabetes Paternal Aunt     Mental Retardation Paternal Aunt     Atrial Fibrillation Maternal Grandmother     High Blood Pressure Maternal Grandmother     Obesity Maternal Grandmother     Stroke Maternal Grandmother     Early Death Maternal Grandfather     Heart Disease Maternal Grandfather     High Blood Pressure Maternal Grandfather     Obesity Maternal Grandfather     Depression Paternal Grandmother     Heart Disease Paternal Grandmother     High Blood Pressure Paternal Grandmother     Kidney Disease Paternal Grandmother     Obesity Paternal Grandmother     Heart Disease Paternal Grandfather     High Blood Pressure Paternal Grandfather      REVIEW OF CURRENT DISEASE STATE  Inflammatory Bowel Disease: Patient here for evaluation of Crohn's Disease. Diagnosis was made by colonoscopy. Onset was 6-7 years ago ([id number]). Disease has involved ileum per patient report. The patient has had no surgery for treatment of their IBD. The patient is currently having 4-8 bowel movements per day which are mucous-laden, semisolid and watery. The patient admits to abdominal pain, located near her ileum.  The pain is described as stabbing, and is 3.5/10 in intensity. The patient does not have fever. The patient does not have weight loss. Patient reports her \"bum is sore all the time\" when asked about other Crohn's symptoms. Last colonoscopy was 2015. Saw Dr. Abdoulaye Hamm less than 6 weeks ago, and then to be see soon after starting Remicade. Nothing scheduled at this time per patient. States her goal is to get off of prednisone as she doesn't like how it makes her feel and believes it is affecting blood sugars. Patient is a nurse. Patient states stress and certain foods are triggers for her. Patient called to schedule first infusion with Dr. Abdoulaye Hamm' office, but has not heard back yet. Remicade shipped by [facility] on 9/22/20    · Considering all the ways in which this condition and others affects you, how are you doing 0-10 (0 being the best)? 8-9  · During the past 4 weeks, have you missed any planned activities of daily living due to condition (work/school/other plans)? No - making big adjustments (not eating on days when she has things to do or places to be)  · During the past 4 weeks, have you had to seek urgent care, ER admission, Unplanned doctor office visit, or hospital admission? No    MEDICATIONS  Medication Sig Comments    ibuprofen (ADVIL;MOTRIN) 200 MG tablet Take 200 mg by mouth every 6 hours as needed for Pain Updated per patient from 800 mg tablet.  predniSONE (DELTASONE) 20 MG tablet Take 20 mg by mouth daily Added per patient.  States she is down from 40 mg daily and has been on prednisone for ~1 month    desvenlafaxine succinate (PRISTIQ) 50 MG TB24 extended release tablet Take 50 mg by mouth daily Added per patient    inFLIXimab (REMICADE) 100 MG injection Infuse 5 mg/kg intravenously See Admin Instructions 5 mg/kg at 0, 2, and 6 weeks, followed by 5 mg/kg every 8 weeks thereafter Added per patient    Prenatal Vit-DSS-Fe Cbn-FA (PRENATAL ADVANTAGE PO) Take 1 tablet by mouth daily Added per patient    ferrous sulfate (IRON 325) 325 (65 Fe) MG tablet Take 325 mg by mouth 2 times daily Added per patient    Multiple Vitamin (STRESS B PO) Take 1 tablet by mouth daily Added per patient (Stress b complex)    L-Methylfolate-Algae (DEPLIN 15 PO) Take 1 each by mouth daily Added per patient    loratadine (CLARITIN) 10 MG capsule Take 10 mg by mouth daily  Updated sig per patient    escitalopram (LEXAPRO) 10 MG tablet Take 10 mg by mouth daily  Updated dose per patient from 15 mg daily    ALPRAZolam (XANAX) 0.5 MG tablet Take 0.5 mg by mouth 3 times daily as needed for Sleep or Anxiety. Updated sig from 0.5 mg nightly prn per patient. Have you used other medications in the past for Ulcerative Colitis/Crohn's? Why did you change therapies?   -Entocort (worked relatively well prior to having son)  -Pentasa (worked relatively well prior to having son)    Current Specialty Medication: Infliximab (Remicade/Renflexis/Inflectra)   Indication Specific Recommended dose: 5 mg/kg at 0, 2, and 6 weeks, followed by 5 mg/kg every 8 weeks thereafter; dose may be increased to 10 mg/kg every 8 weeks in patients who respond but then lose their response. If no response by week 14, consider discontinuing therapy. Warnings/Precautions: Autoimmune disorders; Cardiovascular/cerebrovascular reactions during and following infusion; leukopenia, neutropenia, thrombocytopenia, pancytopenia; Hepatic reactions; Reactivation of hepatitis B virus; Hypersensitivity or infusion reactions; Infections: [US Boxed Warning]; Malignancy: [US Boxed Warning]; Tuberculosis: [US Boxed Warning]; Demyelinating CNS disease (initiation); worsening and new-onset HF has been reported;    Recommended Monitoring: Vitals during infusion; signs/symptoms of infection; CBC with differential; LFTs; TB screening prior to initiating and during therapy; signs/symptoms/worsening of heart failure; signs and symptoms of malignancy (eg, splenomegaly, hepatomegaly, abdominal pain, persistent fever, night sweats, weight loss). Storage: Refrigerate at [facility] to [facility] (36ºF to 46ºF). Do not use REMICADE beyond the expiration date printed on carton and the vial. This product contains no preservative. Handling: Must be administered by healthcare professional    Describe your medication adherence over the last 4 weeks: N/A - has not started  How many doses have you missed in the last 4 weeks, if any? N/A - has not yet started  How confident are you to follow the injection process and the treatment plan? (0-10) 10  Contraindications to therapy present? No    LABS  Lab Results   Component Value Date    BUN 7 08/02/2017    CREATININE <0.5 08/02/2017     Lab Results   Component Value Date    WBC 5.8 08/04/2017    HGB 12.0 08/04/2017    HCT 36.4 08/04/2017    RBC 4.45 08/04/2017     08/04/2017     Lab Results   Component Value Date    ALKPHOS 253 08/08/2017    ALT 93 08/08/2017    AST 27 08/08/2017    BILITOT 1.1 08/08/2017    BILIDIR 0.5 08/08/2017    IBILI 0.6 08/08/2017       IMMUNIZATIONS  Immunization History   Administered Date(s) Administered    Influenza Virus Vaccine 11/19/2019    Td, unspecified formulation 08/08/2015      Immunization status: up to date and documented, missing doses of PCV13, PPSV23 and flu. ASSESSMENTS  Fall Risk 9/23/2020   2 or more falls in past year? no   Fall with injury in past year? no     PROMIS V1.1 [employer] 9/23/2020   In general, would you say your health is: 3   In general, would you say your quality of life is: 3   In general, how would you rate your physical health? 2   In general, how would you rate your mental health, including your mood and your ability to think? 3   In general, how would you rate your satisfaction with your social activities and relationships? 4   In general, please rate how well you carry out your usual social activities and roles.  (This includes activities at home, at work and in your community, and responsibilities as a complete. 3. Drug Interactions  Lexapro/Ibuprofen (Cat D) - Selective Serotonin Reuptake Inhibitors may enhance the antiplatelet effect of Nonsteroidal Anti-Inflammatory Agents (Nonselective). Nonsteroidal Anti-Inflammatory Agents (Nonselective) may diminish the therapeutic effect of Selective Serotonin Reuptake Inhibitors. 4. Other Identified Potential Issues  Obesity - did not address during this visit. Patient currently on prednisone   Vitamin D deficiency- last checked on 8/13/20 (24) and patient said she will have it rechecked soon. PLAN  Goals of therapy, common side effects, medication storage, and administration reviewed with patient. continue with Remicade initiation (Infuse 5 mg/kg IV at week 0, 2, 6 and then every 8 weeks thereafter). Patient has a call out to Dr. Susannah Rand' office to schedule first infusion. Recommended monitoring to complete:   -CBC every 3 months (last checked 8/13/20)  -CMP every 3 months (last checked 8/13/20)  -ESR every 3 months (none on file)  -CRP every 3 months (none on file)  -LFT annually (last checked 8/13/20)  -Hep B as clinically indicated - patient reports she has had the vaccines and was nonreactive when she had her sone in 2016  -TB as clinically indicated - patient reports she had done at Dr. Susannah Rand and was negative     Recommended vaccinations: Recommend 1 dose PCV13 followed by 1 dose PPSV23 at least 8 weeks later, then another dose PPSV23 at least 5 years after previous PPSV23. Also recommended inactivated form of flu shot later this year. Keep all scheduled appointments. Return to clinic in 3 month(s) via phone. or call with any questions or concerns. Will schedule patient closer to that time. Patient requested to receive non-PHI related information (ex. Links to websites, blank PAP applications, educational materials, etc.) via email address provided by the patient and documented in the chart.  This is in lieu of handing out documents during direct

## 2020-09-28 RX ORDER — INFLIXIMAB 100 MG/10ML
INJECTION, POWDER, LYOPHILIZED, FOR SOLUTION INTRAVENOUS
Qty: 8 VIAL | Refills: 8 | Status: SHIPPED | OUTPATIENT
Start: 2020-09-28 | End: 2020-10-21 | Stop reason: SINTOL

## 2020-09-28 NOTE — TELEPHONE ENCOUNTER
Received signed consent from for CPA with SMS on 9/23/20. Sent form to HIM to be attached to patient's chart on 9/28/20. Rx #1 sent by [facility] on 9/22/20 for 16 vials (Infuse 800 mg on week 0 and week 2. Will send SMS Rx for Rx#2 which is 800 mg (8 vials) on week 6 and every 8 weeks thereafter. There were 1+8 refills on the original Rx from Dr. Zelda Garcia office.

## 2020-10-01 ENCOUNTER — NURSE TRIAGE (OUTPATIENT)
Dept: OTHER | Facility: CLINIC | Age: 35
End: 2020-10-01

## 2020-10-01 ENCOUNTER — HOSPITAL ENCOUNTER (EMERGENCY)
Age: 35
Discharge: HOME | End: 2020-10-01
Payer: COMMERCIAL

## 2020-10-01 VITALS
SYSTOLIC BLOOD PRESSURE: 123 MMHG | BODY MASS INDEX: 45.99 KG/M2 | WEIGHT: 293 LBS | RESPIRATION RATE: 16 BRPM | HEIGHT: 67 IN | TEMPERATURE: 98.5 F | OXYGEN SATURATION: 98 % | DIASTOLIC BLOOD PRESSURE: 98 MMHG | HEART RATE: 83 BPM

## 2020-10-01 PROCEDURE — 96375 TX/PRO/DX INJ NEW DRUG ADDON: CPT

## 2020-10-01 PROCEDURE — 2500000003 HC RX 250 WO HCPCS: Performed by: PHYSICIAN ASSISTANT

## 2020-10-01 PROCEDURE — 99283 EMERGENCY DEPT VISIT LOW MDM: CPT

## 2020-10-01 PROCEDURE — 6360000002 HC RX W HCPCS: Performed by: PHYSICIAN ASSISTANT

## 2020-10-01 PROCEDURE — 96374 THER/PROPH/DIAG INJ IV PUSH: CPT

## 2020-10-01 RX ORDER — DIPHENHYDRAMINE HYDROCHLORIDE 50 MG/ML
25 INJECTION INTRAMUSCULAR; INTRAVENOUS ONCE
Status: COMPLETED | OUTPATIENT
Start: 2020-10-01 | End: 2020-10-01

## 2020-10-01 RX ORDER — METHYLPREDNISOLONE SODIUM SUCCINATE 125 MG/2ML
125 INJECTION, POWDER, LYOPHILIZED, FOR SOLUTION INTRAMUSCULAR; INTRAVENOUS ONCE
Status: COMPLETED | OUTPATIENT
Start: 2020-10-01 | End: 2020-10-01

## 2020-10-01 RX ADMIN — DIPHENHYDRAMINE HYDROCHLORIDE 25 MG: 50 INJECTION, SOLUTION INTRAMUSCULAR; INTRAVENOUS at 21:37

## 2020-10-01 RX ADMIN — FAMOTIDINE 20 MG: 10 INJECTION, SOLUTION INTRAVENOUS at 21:37

## 2020-10-01 RX ADMIN — METHYLPREDNISOLONE SODIUM SUCCINATE 125 MG: 125 INJECTION, POWDER, FOR SOLUTION INTRAMUSCULAR; INTRAVENOUS at 21:37

## 2020-10-01 ASSESSMENT — PAIN DESCRIPTION - LOCATION: LOCATION: THROAT;HEAD

## 2020-10-01 ASSESSMENT — PAIN DESCRIPTION - PAIN TYPE: TYPE: ACUTE PAIN

## 2020-10-01 ASSESSMENT — PAIN DESCRIPTION - DESCRIPTORS: DESCRIPTORS: SORE;HEADACHE

## 2020-10-01 ASSESSMENT — PAIN DESCRIPTION - ONSET: ONSET: ON-GOING

## 2020-10-01 ASSESSMENT — PAIN DESCRIPTION - PROGRESSION: CLINICAL_PROGRESSION: NOT CHANGED

## 2020-10-01 ASSESSMENT — PAIN SCALES - GENERAL
PAINLEVEL_OUTOF10: 2
PAINLEVEL_OUTOF10: 0

## 2020-10-01 ASSESSMENT — PAIN DESCRIPTION - FREQUENCY: FREQUENCY: CONTINUOUS

## 2020-10-02 ASSESSMENT — ENCOUNTER SYMPTOMS
COLOR CHANGE: 0
CHEST TIGHTNESS: 0
EYES NEGATIVE: 1
SORE THROAT: 0
SHORTNESS OF BREATH: 0
ABDOMINAL PAIN: 0
VOICE CHANGE: 0
BACK PAIN: 0
FACIAL SWELLING: 1
TROUBLE SWALLOWING: 0

## 2020-10-02 NOTE — ED NOTES
All discharge paperwork and follow-up instructions reviewed with pt. Pt verbalized understanding.  Pt ambulatory upon discharge in stable condition to private vehicle with Arash Stephenson RN  10/01/20 3019

## 2020-10-02 NOTE — ED PROVIDER NOTES
[facility]  ED  EMERGENCY DEPARTMENT ENCOUNTER        Pt Name: Martha Marlow  MRN: [id number]  [place] 1985  Date of evaluation: 10/1/2020  Provider: Jina Joya PA-C  PCP: Hollis Mcintyre MD  ED Attending: Rhett Tapia MD      This patient was not seen by the attending provider     History provided by the patient    CHIEF COMPLAINT:     Chief Complaint   Patient presents with    Medication Reaction     Pt had first Remicade infusion at noon and completed at 3pm for Crohns. Pt received 25mg PO Benadryl at 1pm d/t numbness and tingling with infusion. Pt with facial and tongue swelling starting at 3:15pm. Pt took another 50mg PO Benadryl Pt with difficulty swallowing. HISTORY OF PRESENT ILLNESS:      Martha Marlow is a 37 y.o. female who arrives to the ED by private vehicle. Patient reports concern for allergic reaction from Remicade infusion. She has Crohn's disease. She follows with Dr. Maye Roper. First time Remicade infusion from 12 PM through 3 PM today. As she was getting it she described feeling a numb/tingly/itchy sensation over her body. She received Benadryl 25 mg orally during the infusion for these symptoms. When she completed the infusion she went home and continued with the symptoms. She also reported feeling a sense of swelling to her tongue. She took another Benadryl 50 mg at this time. She took Pepcid 20 mg orally. She is on prednisone for her Crohn's disease and at this time typically takes 20 mg daily. She took an extra dose for a total of 40 mg today. The patient states she was able to eat a small amount of spaghetti. She was able to crush her pills and swallow them without much trouble. However the sense of swelling that she feels to her cheeks and her tongue were enough to bring her to the ED. Nursing Notes were reviewed     REVIEW OF SYSTEMS:     Review of Systems   Constitutional: Negative for chills and fever.    HENT: Positive for facial swelling. Negative for sore throat, trouble swallowing and voice change. Eyes: Negative. Respiratory: Negative for chest tightness and shortness of breath. Cardiovascular: Negative for chest pain. Gastrointestinal: Negative for abdominal pain. Genitourinary: Negative. Musculoskeletal: Negative for back pain and neck pain. Skin: Negative for color change and rash. Neurological: Negative for dizziness and headaches. All other systems reviewed and are negative. Except as noted above in the ROS, all other systems were reviewed and negative.          PAST MEDICAL HISTORY:     Past Medical History:   Diagnosis Date    Abnormal Pap smear of cervix     Anemia     Anxiety     Crohn's colitis ([facility] [address]     Depression     Gestational diabetes     Herniated disc     SAB (spontaneous )     x2         SURGICAL HISTORY:      Past Surgical History:   Procedure Laterality Date     SECTION      CHOLECYSTECTOMY, LAPAROSCOPIC N/A 2017    ECTOPIC PREGNANCY SURGERY      ECTOPIC PREGNANCY SURGERY  2008    ERCP           CURRENT MEDICATIONS:       Discharge Medication List as of 10/1/2020 11:05 PM      CONTINUE these medications which have NOT CHANGED    Details   inFLIXimab (REMICADE) 100 MG injection Infuse 800 mg (5 mg/kg) at week 6, then every 8 weeks thereafter, Disp-8 vial,R-8Rx #2 Patient's most recent weight is 151 kgNormal      ibuprofen (ADVIL;MOTRIN) 200 MG tablet Take 200 mg by mouth every 6 hours as needed for PainHistorical Med      predniSONE (DELTASONE) 20 MG tablet Take 20 mg by mouth dailyHistorical Med      desvenlafaxine succinate (PRISTIQ) 50 MG TB24 extended release tablet Take 50 mg by mouth dailyHistorical Med      Prenatal Vit-DSS-Fe Cbn-FA (PRENATAL ADVANTAGE PO) Take 1 tablet by mouth dailyHistorical Med      ferrous sulfate (IRON 325) 325 (65 Fe) MG tablet Take 325 mg by mouth 2 times dailyHistorical Med      Multiple Vitamin (STRESS B PO) Take 1 tablet by mouth dailyHistorical Med      L-Methylfolate-Algae (DEPLIN 15 PO) Take 1 each by mouth dailyHistorical Med      loratadine (CLARITIN) 10 MG capsule Take 10 mg by mouth daily Historical Med      escitalopram (LEXAPRO) 10 MG tablet Take 10 mg by mouth daily Historical Med      ALPRAZolam (XANAX) 0.5 MG tablet Take 0.5 mg by mouth 3 times daily as needed for Sleep or Anxiety.  Historical Med               ALLERGIES:    Doxycycline    FAMILY HISTORY:       Family History   Problem Relation Age of Onset    Arthritis Father     High Blood Pressure Father     Diabetes Paternal Aunt     Mental Retardation Paternal Aunt     Atrial Fibrillation Maternal Grandmother     High Blood Pressure Maternal Grandmother     Obesity Maternal Grandmother     Stroke Maternal Grandmother     Early Death Maternal Grandfather     Heart Disease Maternal Grandfather     High Blood Pressure Maternal Grandfather     Obesity Maternal Grandfather     Depression Paternal Grandmother     Heart Disease Paternal Grandmother     High Blood Pressure Paternal Grandmother     Kidney Disease Paternal Grandmother     Obesity Paternal Grandmother     Heart Disease Paternal Grandfather     High Blood Pressure Paternal Grandfather           SOCIAL HISTORY:       Social History     Socioeconomic History    Marital status:      Spouse name: None    Number of children: 0    Years of education: None    Highest education level: None   Occupational History    Occupation: RN   Social Needs    Financial resource strain: None    Food insecurity     Worry: None     Inability: None    Transportation needs     Medical: None     Non-medical: None   Tobacco Use    Smoking status: Never Smoker    Smokeless tobacco: Never Used   Substance and Sexual Activity    Alcohol use: No    Drug use: No    Sexual activity: Yes     Partners: Male   Lifestyle    Physical activity     Days per week: None     Minutes per session: None    Stress: None Relationships    Social connections     Talks on phone: None     Gets together: None     Attends Sikhism service: None     Active member of club or organization: None     Attends meetings of clubs or organizations: None     Relationship status: None    Intimate partner violence     Fear of current or ex partner: None     Emotionally abused: None     Physically abused: None     Forced sexual activity: None   Other Topics Concern    None   Social History Narrative    None       SCREENINGS:    [place] Coma Scale  Eye Opening: Spontaneous  Best Verbal Response: Oriented  Best Motor Response: Obeys commands  [place] Coma Scale Score: 15        PHYSICAL EXAM:       ED Triage Vitals [10/01/20 2114]   BP Temp Temp Source Pulse Resp SpO2 Height Weight   (!) 139/102 98.5 °F (36.9 °C) Oral 100 20 99 % 5' 7\" (1.702 m) (!) 340 lb (154.2 kg)       Physical Exam    CONSTITUTIONAL: Awake and alert. Cooperative. Well-developed. Well-nourished. Non-toxic. No acute distress. HENT: Normocephalic. Atraumatic. No obvious/objective swelling to the face noted. External ears normal, without discharge. No nasal discharge. Oropharynx clear. Mucous membranes moist.  No drooling. No trismus. Intraorally I do not appreciate obvious swelling to the tongue. Posterior pharynx is clear and easily visualized. No posterior oropharyngeal edema. Uvula midline. No hot potato voice. EYES: Conjunctiva non-injected. No scleral icterus. PERRL. EOM's grossly intact. NECK: Supple. Normal ROM. No nuchal rigidity  CARDIOVASCULAR: RRR. No [place]. Intact distal pulses. PULMONARY/CHEST WALL: Effort normal. No tachypnea. Lungs clear to ausculation. No wheezing or stridor. ABDOMEN: Normal BS. Soft. Nondistended. No tenderness to palpate. No guarding. /ANORECTAL: Not assessed  MUSKULOSKELETAL: Normal ROM. No acute deformities. No edema. No tenderness to palpate. SKIN: Warm and dry. No rash. NEUROLOGICAL: Alert and oriented x 3.  GCS 15. CN II-XII grossly intact. Strength is 5/5 in all extremities and sensation is intact. Normal gait. PSYCHIATRIC: Normal affect        DIAGNOSTICRESULTS:     None        PROCEDURES:   N/A    CRITICAL CARE TIME:       Due to the immediate potential for life-threatening deterioration due to reported intraoral/tongue swelling and concerns for severe allergic reaction on arrival, I spent 14 minutes providing critical care. This time is excluding time spent performing procedures. CONSULTS:  IP CONSULT TO GI      EMERGENCY DEPARTMENT COURSE and DIFFERENTIAL DIAGNOSIS/MDM:   Vitals:    Vitals:    10/01/20 2114 10/01/20 2146 10/01/20 2223 10/01/20 2255   BP: (!) 139/102 (!) 122/108 121/83 (!) 123/98   Pulse: 100 90 84 83   Resp: 20 17 18 16   Temp: 98.5 °F (36.9 °C)      TempSrc: Oral      SpO2: 99% 98% 98% 98%   Weight: (!) 340 lb (154.2 kg)      Height: 5' 7\" (1.702 m)          Patient was given the following medications:  Medications   methylPREDNISolone sodium (SOLU-MEDROL) injection 125 mg (125 mg Intravenous Given 10/1/20 2137)   diphenhydrAMINE (BENADRYL) injection 25 mg (25 mg Intravenous Given 10/1/20 2137)   famotidine (PEPCID) injection 20 mg (20 mg Intravenous Given 10/1/20 2137)         I have evaluated this patient in the ED  Old records were reviewed. Patient received a Remicade infusion between 12 PM and 3 PM today. During that she began experiencing a itchy and tingling feeling throughout her body followed by a feeling of swelling to the face and tongue. She has been appropriately medicating herself with antihistamines and steroids at home but came in tonight for a recheck because of persistent symptoms. Physical exam is fairly unremarkable as there is no objective swelling. Airway is clear. She is tolerating p.o. She is given Solu-Medrol 125 mg IV with Benadryl 25 mg IV and Pepcid 20 mg IV. She monitored in the ED for nearly 2 hours and then has had some improvement in symptoms.   I did touch

## 2020-10-02 NOTE — ED NOTES
DONITA Mauro at bedside assessing pt. Pt states she had her first ever Remicade infusion today for Crohns. Pt had numbness/tingling during infusion and received PO Benadryl. Pts infusion completed at 3pm. Pt stated facial and tongue swelling started at 3:15pm. Pt states she still has facial/tongue numbness/tingling at this time. Pt is talking easily and is able to clear secretions easily. Pts SpO2 98% on room air at this time.      Dilip Shrestha RN  10/01/20 2125

## 2020-10-02 NOTE — ED NOTES
[phone]  Per [address], reaction from Remicade infusion  Chip Nunez @[phone]       Sophia Dickerson  10/01/20 2223

## 2020-10-14 ENCOUNTER — TELEPHONE (OUTPATIENT)
Dept: PHARMACY | Age: 35
End: 2020-10-14

## 2020-10-21 NOTE — TELEPHONE ENCOUNTER
Entyvio PA approved on 10/15/20 after appeal was overturned. Entyvio ordered for 300 mg at week 0, 2, 6, and every 8 weeks thereafter. First 2 doses sent on 10/20/20. Will reorder second Entyvio order as SMS Rx as patient has signed consent form on file.

## 2020-11-25 ENCOUNTER — TELEPHONE (OUTPATIENT)
Dept: PHARMACY | Age: 35
End: 2020-11-25

## 2020-11-25 RX ORDER — CHOLESTYRAMINE 4 G/9G
1 POWDER, FOR SUSPENSION ORAL PRN
COMMUNITY
End: 2021-01-28

## 2020-11-25 RX ORDER — ACETAMINOPHEN 160 MG
2000 TABLET,DISINTEGRATING ORAL DAILY
COMMUNITY

## 2020-11-25 NOTE — TELEPHONE ENCOUNTER
CLINICAL PHARMACY NOTE - SPECIALTY MEDICATION SERVICE     Van Shay is a 37 y.o. female presenting today for   Chief Complaint   Patient presents with    Medication Management     Entyvio     Allergies   Allergen Reactions    Remicade [Infliximab] Swelling     Face and tongue swelling    Doxycycline Diarrhea       Past Medical History:   Diagnosis Date    Abnormal Pap smear of cervix     Anemia     Anxiety     Crohn's colitis ([facility] [address]     Depression     Gestational diabetes     Herniated disc     SAB (spontaneous )     x2      Social History     Socioeconomic History    Marital status:      Spouse name: Not on file    Number of children: 0    Years of education: Not on file    Highest education level: Not on file   Occupational History    Occupation: RN   Social Needs    Financial resource strain: Not on file    Food insecurity     Worry: Not on file     Inability: Not on file   Shopcaster needs     Medical: Not on file     Non-medical: Not on file   Tobacco Use    Smoking status: Never Smoker    Smokeless tobacco: Never Used   Substance and Sexual Activity    Alcohol use: No    Drug use: No    Sexual activity: Yes     Partners: Male   Lifestyle    Physical activity     Days per week: Not on file     Minutes per session: Not on file    Stress: Not on file   Relationships    Social connections     Talks on phone: Not on file     Gets together: Not on file     Attends [employer] service: Not on file     Active member of club or organization: Not on file     Attends meetings of clubs or organizations: Not on file     Relationship status: Not on file    Intimate partner violence     Fear of current or ex partner: Not on file     Emotionally abused: Not on file     Physically abused: Not on file     Forced sexual activity: Not on file   Other Topics Concern    Not on file   Social History Narrative    Not on file     Family History   Problem Relation Age of Onset  Arthritis Father     High Blood Pressure Father     Diabetes Paternal Aunt     Mental Retardation Paternal Aunt     Atrial Fibrillation Maternal Grandmother     High Blood Pressure Maternal Grandmother     Obesity Maternal Grandmother     Stroke Maternal Grandmother     Early Death Maternal Grandfather     Heart Disease Maternal Grandfather     High Blood Pressure Maternal Grandfather     Obesity Maternal Grandfather     Depression Paternal Grandmother     Heart Disease Paternal Grandmother     High Blood Pressure Paternal Grandmother     Kidney Disease Paternal Grandmother     Obesity Paternal Grandmother     Heart Disease Paternal Grandfather     High Blood Pressure Paternal Grandfather      INTERM HISTORY  Last seen by [facility]: 9/23/20  Last seen by Gastroenterologist: Dr. Feliciano  visit scheduled for 12/22/20. Have you been diagnosed with any additional conditions since we last talked? No  Have you developed any new allergies since we last talked? No  Have you stopped taking any medications or supplements since we last talked? Yes - patient weaned off Prednisone  Have you started taking any additional medications or supplements since we last talked? Yes - Iron increased from BID to TID dosing. Added Vitamin D per patient. Added Questran per patient. REVIEW OF CURRENT DISEASE STATE  Inflammatory Bowel Disease: Patient here for evaluation of Crohn's Disease. Diagnosis was made by colonoscopy. Onset was 6-7 years ago ([id number]). Disease has involved ileum per patient report. The patient has had no surgery for treatment of their IBD. The patient is currently having 4-8 bowel movements per day which are mucous-laden, semisolid and watery. The patient admits to abdominal pain, located near her ileum. The pain is described as stabbing, and is 3.5/10 in intensity. The patient does not have fever. The patient does not have weight loss.   Patient reports her \"bum is sore all the time\" when asked about other Crohn's symptoms. Last colonoscopy was 2015. Saw Dr. Phong Candelario less than 6 weeks ago, and then to be see soon after starting Remicade. Nothing scheduled at this time per patient. States her goal is to get off of prednisone as she doesn't like how it makes her feel and believes it is affecting blood sugars. Patient is a nurse. Patient states stress and certain foods are triggers for her. 11/25/20 Update: Patient's last Entyvio loading dose is scheduled for 12/4/20 [facility] [employer] states to discontinue therapy in patients who show no evidence of therapeutic benefit by week 14. Patient reports 8-12 bowel movements/day at work (due to stress), but reports 4-6 bowel movements/day at home. No blood/mucous in stool, but she has noticed it has been green lately. Abdominal pain is the same as above; in RUQ. Had COVID test a month and a half ago (negative), but reports having fever at the time. No unintentional weight loss lately. Patient reports feeling extremely fatigued for first 3 days of infusion. No big improvements in Crohn's symptoms as of yet. F/U with Dr. Phong Candelario scheduled for 12/22/20. · Are you currently having a flare? severe  · Considering all the ways in which this condition and others affects you, how are you doing (0 = very well, 10 = very poorly)? 9  · During the past 4 weeks, have you missed any activities of daily living (work/school)? Yes - continues making big adjustments to life due to stress/fatigue/bowel movements. Has missed her son's doctor appointments and work is not pleasant due to how stress affects her Crohn's symptoms. · During the past 4 weeks, have you had to seek urgent care?  No    MEDICATIONS  Current Outpatient Medications on File Prior to Visit   Medication Sig Dispense Refill    Cholecalciferol (VITAMIN D3) 50 MCG (2000 UT) CAPS Take 2,000 Units by mouth daily      cholestyramine (QUESTRAN) 4 g packet Take 1 packet by mouth as needed      vedolizumab (ENTYVIO) 300 MG injection Infuse 300 mg at Week 6 and every 8 weeks thereafter 1 each 6    ibuprofen (ADVIL;MOTRIN) 200 MG tablet Take 200 mg by mouth every 6 hours as needed for Pain      desvenlafaxine succinate (PRISTIQ) 50 MG TB24 extended release tablet Take 50 mg by mouth daily      Prenatal Vit-DSS-Fe Cbn-FA (PRENATAL ADVANTAGE PO) Take 1 tablet by mouth daily      ferrous sulfate (IRON 325) 325 (65 Fe) MG tablet Take 325 mg by mouth 3 times daily (with meals)       Multiple Vitamin (STRESS B PO) Take 1 tablet by mouth daily      L-Methylfolate-Algae (DEPLIN 15 PO) Take 1 each by mouth daily      [DISCONTINUED] predniSONE (DELTASONE) 20 MG tablet Take 20 mg by mouth daily      loratadine (CLARITIN) 10 MG capsule Take 10 mg by mouth daily       escitalopram (LEXAPRO) 10 MG tablet Take 10 mg by mouth daily       ALPRAZolam (XANAX) 0.5 MG tablet Take 0.5 mg by mouth 3 times daily as needed for Sleep or Anxiety. No current facility-administered medications on file prior to visit. Current Specialty Medication: Vedolizumab Doeterrence Hinojosa)  Crohn disease or ulcerative colitis: IV: 300 mg at 0, 2, and 6 weeks and then every 8 weeks thereafter. Discontinue therapy in patients who show no evidence of therapeutic benefit by week 14  Warnings/Precautions: Hypersensitivity reactions; Infections; Elevations of transaminase and/or bilirubin; progressive multifocal leukoencephalopathy (PML) (new onset or worsening of neurological signs and symptoms including progressive weakness on one side of the body or clumsiness of limbs, disturbance of vision, and changes in thinking, memory, and orientation leading to confusion and personality changes)  Recommended Monitoring: LFTs (every 6 months); Annual TB screening  Storage: Refrigerate unopened single -use vials at 2°C to 8°C (36°F to 46°F).  Retain in original package to protect from light  Handling: Remove from refrigerator and allow to reach room temperature before administration. Do not use beyond expiration date printed on label. Describe your medication adherence over the last 4 weeks: Excellent  How many doses have you missed in the last 4 weeks, if any? 0  What side effects are you currently experiencing? Nausea during infusion (oral/suppository phenergan available to patient), feels like flu for 3 days afterwards (4th day after infusion, she is fine). Also notes weakness, headache and fatigue with infusions. Discussed taking infusion before days off, but her work schedule is not consistent. How confident are you to follow the injection process and the treatment plan? (0-10) N/A - infusion    IMMUNIZATIONS  Immunization History   Administered Date(s) Administered    Influenza Virus Vaccine 01/01/2011, 01/01/2012, 01/01/2013, 09/15/2015, 01/01/2017, 01/16/2017, 12/01/2017, 10/18/2018, 11/19/2019    Influenza Whole 01/01/2014    Meningococcal MCV4P (Menactra) 06/30/2005    Td, unspecified formulation 08/08/2015    Tdap (Boostrix, Adacel) 03/10/2017      Immunization status: up to date and documented, received flu shot on 11/17/20 at work. Discussed PCV13 and PPSV23. Patient to bring up with Dr. Zulema Lockett at 12/22/20 appointment. ASSESSMENTS  Fall Risk 9/23/2020   2 or more falls in past year? no   Fall with injury in past year? no     PROMIS V1.1 [employer] Health 9/23/2020   In general, would you say your health is: 3   In general, would you say your quality of life is: 3   In general, how would you rate your physical health? 2   In general, how would you rate your mental health, including your mood and your ability to think? 3   In general, how would you rate your satisfaction with your social activities and relationships? 4   In general, please rate how well you carry out your usual social activities and roles.  (This includes activities at home, at work and in your community, and responsibilities as a parent, child, spouse, employee, friend, etc.) 4   To what extent are you able to carry out your everyday physical activities such as walking, climbing stairs, carrying groceries, or moving a chair? 4   In the past 7 days how often have you been bothered by emotional problems such as feeling anxious, depressed or irritable? 2   In the past 7 days how would you rate your fatigue on average? 3   In the past 7 days how would you rate your pain on average? 2   How comfortable are you filling out medical forms by yourself? 4   What amount of pain have you experienced in the last week in your other knee/hip? 0   My BACK PAIN at the moment is 0   Mode of Collection 2   Person Completing Survey 0   PROMIS Physical Score 11   PROMIS Mental Score 12     1. Crohn's Disease   Van Shay is a 37 y.o. female being treated for Crohn's Disease.  Medication Reconciliation completed, drug-drug interactions identified and listed below. Patient is trying to space supplements and Questran as best she can to avoid interactions. Patient is taking ibuprofen as needed. Allergy and diagnosis info reviewed and updated. Van Shay has a history remarkable for the following conditions: Abnormal LFT, Anxiety/Depression, Obesity, Iron Deficiency, and Vitamin D deficiency.  Current therapy includes: Entyvio 300 mg every 8 weeks starting 12/4/20 (week 6 of therapy). Patient should have dose on Week 10 and Week 14. If no therapeutic benefit after Week 14, could consider discontinuation. Patient is also taking Phenergan to help with infusion side effects. Taking Cholestyramine to help with other Crohn's symptoms.  Medication Effectiveness: Patient disease is not well controlled on current therapy.  Patient had no questions regarding the medication's warnings, precautions, and contraindications. Confirmed appropriate storage.  Patient had no concerns with the administration process.  Current disease state symptoms include:  Increased frequency of bowel movements, green stools, and abdominal pain that is not consistent, but is stabbing in nature and 3.5/10 in severity. No fever or unintentional weight loss reported.  Side effects/adverse events reported, but no adherence issues identified. Patient reports feeling nausea, fatigue, weakness, dizziness, and headaches within infusion. Feels bad for 3 days after infusion.  Patient is not considered high risk. Based on patient feedback/results of the assessment, the therapy is still appropriate. Patient has follow up with Dr. Ruddy Womack on 12/22/20 which will be during week 9 of therapy. Effectiveness should be evaluated at week 14 as well. 2. Immunizations  Immunization status: up to date and documented, missing doses of [id number] and [id number]. Patient to discuss with Dr. Ruddy Womack  3. Drug Interactions  Cholestyramine/Vitamin D (Cat D) - Bile Acid Sequestrants may decrease the serum concentration of Vitamin D Analogs. More specifically, bile acid sequestrants may impair absorption of Vitamin D Analogs. Cholestyramine/Multivitamin (Cat D) - Bile Acid Sequestrants may decrease the serum concentration of Multivitamins/Minerals (with ADEK, Folate, Iron). Specifically, bile acid sequestrants may impair the absorption of fat-soluble vitamins. Escitalopram/Ibuprofen (Cat D) - Selective Serotonin Reuptake Inhibitors may enhance the antiplatelet effect of Nonsteroidal Anti-Inflammatory Agents (Nonselective). Nonsteroidal Anti-Inflammatory Agents (Nonselective) may diminish the therapeutic effect of Selective Serotonin Reuptake Inhibitors. 4. Other Identified Potential Issues  Obesity - did not address during this visit. Vitamin D deficiency- last checked on 8/13/20 (24) and patient said she will have it rechecked soon. PLAN  Goals of therapy, common side effects, medication storage, and administration reviewed with patient. continue Entyvio 300 mg every 8 weeks. Follow up with Dr. Ruddy Womack is due on 12/22/20.      Recommended monitoring to complete:   -LFT every 6 months, last checked 8/13/20  -TB as clinically indicated, patient reports negative TB test with Dr. Natalie Louis office recently. Recommended vaccines:  -Recommend 1 dose [id number] followed by 1 dose [id number] at least 8 weeks later, then another dose [id number] at least 5 years after previous [id number]    Keep all scheduled appointments. Return to clinic in 3 month(s) via phone. or call with any questions or concerns.

## 2020-12-29 ENCOUNTER — HOSPITAL ENCOUNTER (OUTPATIENT)
Dept: CT IMAGING | Age: 35
Discharge: HOME | End: 2020-12-29
Payer: COMMERCIAL

## 2020-12-29 PROCEDURE — 6360000004 HC RX CONTRAST MEDICATION: Performed by: INTERNAL MEDICINE

## 2020-12-29 PROCEDURE — 74177 CT ABD & PELVIS W/CONTRAST: CPT

## 2020-12-29 RX ADMIN — IOPAMIDOL 75 ML: 755 INJECTION, SOLUTION INTRAVENOUS at 10:06

## 2020-12-29 RX ADMIN — IOHEXOL 50 ML: 240 INJECTION, SOLUTION INTRATHECAL; INTRAVASCULAR; INTRAVENOUS; ORAL at 10:07

## 2021-01-28 ENCOUNTER — OFFICE VISIT (OUTPATIENT)
Dept: PRIMARY CARE CLINIC | Age: 36
End: 2021-01-28
Payer: COMMERCIAL

## 2021-01-28 ENCOUNTER — HOSPITAL ENCOUNTER (OUTPATIENT)
Dept: NON INVASIVE DIAGNOSTICS | Age: 36
Discharge: HOME | End: 2021-01-28
Payer: COMMERCIAL

## 2021-01-28 DIAGNOSIS — Z11.59 SCREENING FOR VIRAL DISEASE: Primary | ICD-10-CM

## 2021-01-28 DIAGNOSIS — Z01.818 PRE-OP TESTING: ICD-10-CM

## 2021-01-28 LAB
LV EF: 55 %
LVEF MODALITY: NORMAL
SARS-COV-2: NOT DETECTED

## 2021-01-28 PROCEDURE — 99211 OFF/OP EST MAY X REQ PHY/QHP: CPT | Performed by: NURSE PRACTITIONER

## 2021-01-28 PROCEDURE — 93306 TTE W/DOPPLER COMPLETE: CPT

## 2021-01-28 NOTE — PATIENT INSTRUCTIONS
Advance Care Planning  People with COVID-19 may have no symptoms, mild symptoms, such as fever, cough, and shortness of breath or they may have more severe illness, developing severe and fatal pneumonia. As a result, Advance Care Planning with attention to naming a health care decision maker (someone you trust to make healthcare decisions for you if you could not speak for yourself) and sharing other health care preferences is important BEFORE a possible health crisis. Please contact your Primary Care Provider to discuss Advance Care Planning. Preventing the Spread of Coronavirus Disease 2019 in Homes and Residential Communities  For the most recent information go to [email or web address]    Prevention steps for People with confirmed or suspected COVID-19 (including persons under investigation) who do not need to be hospitalized  and   People with confirmed COVID-19 who were hospitalized and determined to be medically stable to go home    Your healthcare provider and public health staff will evaluate whether you can be cared for at home. If it is determined that you do not need to be hospitalized and can be isolated at home, you will be monitored by staff from your local or state health department. You should follow the prevention steps below until a healthcare provider or local or state health department says you can return to your normal activities. Stay home except to get medical care  People who are mildly ill with COVID-19 are able to isolate at home during their illness. You should restrict activities outside your home, except for getting medical care. Do not go to work, school, or public areas. Avoid using public transportation, ride-sharing, or taxis. Separate yourself from other people and animals in your home  People: As much as possible, you should stay in a specific room and away from other people in your home.  Also, you should use a separate bathroom, if available. Animals: You should restrict contact with pets and other animals while you are sick with COVID-19, just like you would around other people. Although there have not been reports of pets or other animals becoming sick with COVID-19, it is still recommended that people sick with COVID-19 limit contact with animals until more information is known about the virus. When possible, have another member of your household care for your animals while you are sick. If you are sick with COVID-19, avoid contact with your pet, including petting, snuggling, being kissed or licked, and sharing food. If you must care for your pet or be around animals while you are sick, wash your hands before and after you interact with pets and wear a facemask. Call ahead before visiting your doctor  If you have a medical appointment, call the healthcare provider and tell them that you have or may have COVID-19. This will help the healthcare providers office take steps to keep other people from getting infected or exposed. Wear a facemask  You should wear a facemask when you are around other people (e.g., sharing a room or vehicle) or pets and before you enter a healthcare providers office. If you are not able to wear a facemask (for example, because it causes trouble breathing), then people who live with you should not stay in the same room with you, or they should wear a facemask if they enter your room. Cover your coughs and sneezes  Cover your mouth and nose with a tissue when you cough or sneeze. Throw used tissues in a lined trash can. Immediately wash your hands with soap and water for at least 20 seconds or, if soap and water are not available, clean your hands with an alcohol-based hand  that contains at least 60% alcohol.   Clean your hands often  Wash your hands often with soap and water for at least 20 seconds, especially after blowing your nose, coughing, or sneezing; going to the bathroom; and have a medical emergency and need to call 911, notify the dispatch personnel that you have, or are being evaluated for COVID-19. If possible, put on a facemask before emergency medical services arrive. Discontinuing home isolation  Patients with confirmed COVID-19 should remain under home isolation precautions until the risk of secondary transmission to others is thought to be low. The decision to discontinue home isolation precautions should be made on a case-by-case basis, in consultation with healthcare providers and state and local health departments.

## 2021-01-28 NOTE — PROGRESS NOTES
Vanesa DUENAS Frederickfelix received a viral test for COVID-19. They were educated on isolation and quarantine as appropriate. For any symptoms, they were directed to seek care from their PCP, given contact information to establish with a doctor, directed to an urgent care or the emergency room.

## 2021-01-28 NOTE — PROGRESS NOTES
Obstructive Sleep Apnea (TJ) Screening     Patient: Go Mcmullen    YOB: 1985      Medical Record #:  [id number]                     Date:  1/28/2021     1. Are you a loud and/or regular snorer? []  Yes       [x] No    2. Have you been observed to gasp or stop breathing during sleep? []  Yes       [x] No    3. Do you feel tired or groggy upon awakening or do you awaken with a headache?           []  Yes       [] No    4. Are you often tired or fatigued during the wake time hours? []  Yes       [] No    5. Do you fall asleep sitting, reading, watching TV or driving? []  Yes       [] No    6. Do you often have problems with memory or concentration? []  Yes       [] No    **If patient's score is ? 3 they are considered high risk for TJ. An Anesthesia provider will evaluate the patient and develop a plan of care the day of surgery. Note:  If the patient's BMI is more than 35 kg m¯² , has neck circumference > 40 cm, and/or high blood pressure the risk is greater (© American Sleep Apnea Association, 2006).

## 2021-02-03 ENCOUNTER — ANESTHESIA (OUTPATIENT)
Dept: ENDOSCOPY | Age: 36
End: 2021-02-03
Payer: COMMERCIAL

## 2021-02-03 ENCOUNTER — HOSPITAL ENCOUNTER (OUTPATIENT)
Age: 36
Setting detail: OUTPATIENT SURGERY
Discharge: HOME | End: 2021-02-03
Attending: INTERNAL MEDICINE | Admitting: INTERNAL MEDICINE
Payer: COMMERCIAL

## 2021-02-03 ENCOUNTER — ANESTHESIA EVENT (OUTPATIENT)
Dept: ENDOSCOPY | Age: 36
End: 2021-02-03
Payer: COMMERCIAL

## 2021-02-03 VITALS
WEIGHT: 293 LBS | HEIGHT: 67 IN | RESPIRATION RATE: 20 BRPM | OXYGEN SATURATION: 99 % | BODY MASS INDEX: 45.99 KG/M2 | HEART RATE: 119 BPM | DIASTOLIC BLOOD PRESSURE: 81 MMHG | TEMPERATURE: 97.1 F | SYSTOLIC BLOOD PRESSURE: 119 MMHG

## 2021-02-03 VITALS — SYSTOLIC BLOOD PRESSURE: 120 MMHG | DIASTOLIC BLOOD PRESSURE: 80 MMHG | OXYGEN SATURATION: 100 %

## 2021-02-03 DIAGNOSIS — R19.7 DIARRHEA, UNSPECIFIED TYPE: ICD-10-CM

## 2021-02-03 LAB — PREGNANCY, URINE: NEGATIVE

## 2021-02-03 PROCEDURE — 6370000000 HC RX 637 (ALT 250 FOR IP): Performed by: INTERNAL MEDICINE

## 2021-02-03 PROCEDURE — 3609010300 HC COLONOSCOPY W/BIOPSY SINGLE/MULTIPLE: Performed by: INTERNAL MEDICINE

## 2021-02-03 PROCEDURE — 2709999900 HC NON-CHARGEABLE SUPPLY: Performed by: INTERNAL MEDICINE

## 2021-02-03 PROCEDURE — 3700000001 HC ADD 15 MINUTES (ANESTHESIA): Performed by: INTERNAL MEDICINE

## 2021-02-03 PROCEDURE — 7100000010 HC PHASE II RECOVERY - FIRST 15 MIN: Performed by: INTERNAL MEDICINE

## 2021-02-03 PROCEDURE — 3700000000 HC ANESTHESIA ATTENDED CARE: Performed by: INTERNAL MEDICINE

## 2021-02-03 PROCEDURE — 2580000003 HC RX 258: Performed by: NURSE ANESTHETIST, CERTIFIED REGISTERED

## 2021-02-03 PROCEDURE — 2500000003 HC RX 250 WO HCPCS: Performed by: NURSE ANESTHETIST, CERTIFIED REGISTERED

## 2021-02-03 PROCEDURE — 7100000011 HC PHASE II RECOVERY - ADDTL 15 MIN: Performed by: INTERNAL MEDICINE

## 2021-02-03 PROCEDURE — 2580000003 HC RX 258: Performed by: INTERNAL MEDICINE

## 2021-02-03 PROCEDURE — 88305 TISSUE EXAM BY PATHOLOGIST: CPT

## 2021-02-03 PROCEDURE — 6360000002 HC RX W HCPCS: Performed by: NURSE ANESTHETIST, CERTIFIED REGISTERED

## 2021-02-03 PROCEDURE — 84703 CHORIONIC GONADOTROPIN ASSAY: CPT

## 2021-02-03 RX ORDER — SODIUM CHLORIDE, SODIUM LACTATE, POTASSIUM CHLORIDE, CALCIUM CHLORIDE 600; 310; 30; 20 MG/100ML; MG/100ML; MG/100ML; MG/100ML
INJECTION, SOLUTION INTRAVENOUS CONTINUOUS PRN
Status: DISCONTINUED | OUTPATIENT
Start: 2021-02-03 | End: 2021-02-03 | Stop reason: SDUPTHER

## 2021-02-03 RX ORDER — HYDRALAZINE HYDROCHLORIDE 20 MG/ML
5 INJECTION INTRAMUSCULAR; INTRAVENOUS EVERY 10 MIN PRN
Status: DISCONTINUED | OUTPATIENT
Start: 2021-02-03 | End: 2021-02-03 | Stop reason: HOSPADM

## 2021-02-03 RX ORDER — ONDANSETRON 2 MG/ML
4 INJECTION INTRAMUSCULAR; INTRAVENOUS PRN
Status: DISCONTINUED | OUTPATIENT
Start: 2021-02-03 | End: 2021-02-03 | Stop reason: HOSPADM

## 2021-02-03 RX ORDER — MORPHINE SULFATE 2 MG/ML
2 INJECTION, SOLUTION INTRAMUSCULAR; INTRAVENOUS EVERY 5 MIN PRN
Status: DISCONTINUED | OUTPATIENT
Start: 2021-02-03 | End: 2021-02-03 | Stop reason: HOSPADM

## 2021-02-03 RX ORDER — MEPERIDINE HYDROCHLORIDE 50 MG/ML
12.5 INJECTION INTRAMUSCULAR; INTRAVENOUS; SUBCUTANEOUS EVERY 5 MIN PRN
Status: DISCONTINUED | OUTPATIENT
Start: 2021-02-03 | End: 2021-02-03 | Stop reason: HOSPADM

## 2021-02-03 RX ORDER — PROMETHAZINE HYDROCHLORIDE 25 MG/ML
6.25 INJECTION, SOLUTION INTRAMUSCULAR; INTRAVENOUS
Status: DISCONTINUED | OUTPATIENT
Start: 2021-02-03 | End: 2021-02-03 | Stop reason: HOSPADM

## 2021-02-03 RX ORDER — MIDAZOLAM HYDROCHLORIDE 1 MG/ML
INJECTION INTRAMUSCULAR; INTRAVENOUS PRN
Status: DISCONTINUED | OUTPATIENT
Start: 2021-02-03 | End: 2021-02-03 | Stop reason: SDUPTHER

## 2021-02-03 RX ORDER — OXYCODONE HYDROCHLORIDE AND ACETAMINOPHEN 5; 325 MG/1; MG/1
2 TABLET ORAL PRN
Status: DISCONTINUED | OUTPATIENT
Start: 2021-02-03 | End: 2021-02-03 | Stop reason: HOSPADM

## 2021-02-03 RX ORDER — OXYCODONE HYDROCHLORIDE AND ACETAMINOPHEN 5; 325 MG/1; MG/1
1 TABLET ORAL PRN
Status: DISCONTINUED | OUTPATIENT
Start: 2021-02-03 | End: 2021-02-03 | Stop reason: HOSPADM

## 2021-02-03 RX ORDER — DIPHENHYDRAMINE HYDROCHLORIDE 50 MG/ML
12.5 INJECTION INTRAMUSCULAR; INTRAVENOUS
Status: DISCONTINUED | OUTPATIENT
Start: 2021-02-03 | End: 2021-02-03 | Stop reason: HOSPADM

## 2021-02-03 RX ORDER — KETAMINE HYDROCHLORIDE 50 MG/ML
INJECTION, SOLUTION, CONCENTRATE INTRAMUSCULAR; INTRAVENOUS PRN
Status: DISCONTINUED | OUTPATIENT
Start: 2021-02-03 | End: 2021-02-03 | Stop reason: SDUPTHER

## 2021-02-03 RX ORDER — PROPOFOL 10 MG/ML
INJECTION, EMULSION INTRAVENOUS PRN
Status: DISCONTINUED | OUTPATIENT
Start: 2021-02-03 | End: 2021-02-03 | Stop reason: SDUPTHER

## 2021-02-03 RX ORDER — LIDOCAINE HYDROCHLORIDE 10 MG/ML
0.1 INJECTION, SOLUTION EPIDURAL; INFILTRATION; INTRACAUDAL; PERINEURAL
Status: DISCONTINUED | OUTPATIENT
Start: 2021-02-03 | End: 2021-02-03 | Stop reason: HOSPADM

## 2021-02-03 RX ORDER — LIDOCAINE HYDROCHLORIDE 10 MG/ML
INJECTION, SOLUTION INFILTRATION; PERINEURAL PRN
Status: DISCONTINUED | OUTPATIENT
Start: 2021-02-03 | End: 2021-02-03 | Stop reason: SDUPTHER

## 2021-02-03 RX ORDER — LABETALOL HYDROCHLORIDE 5 MG/ML
5 INJECTION, SOLUTION INTRAVENOUS EVERY 10 MIN PRN
Status: DISCONTINUED | OUTPATIENT
Start: 2021-02-03 | End: 2021-02-03 | Stop reason: HOSPADM

## 2021-02-03 RX ORDER — SODIUM CHLORIDE, SODIUM LACTATE, POTASSIUM CHLORIDE, CALCIUM CHLORIDE 600; 310; 30; 20 MG/100ML; MG/100ML; MG/100ML; MG/100ML
INJECTION, SOLUTION INTRAVENOUS ONCE
Status: COMPLETED | OUTPATIENT
Start: 2021-02-03 | End: 2021-02-03

## 2021-02-03 RX ORDER — MORPHINE SULFATE 2 MG/ML
1 INJECTION, SOLUTION INTRAMUSCULAR; INTRAVENOUS EVERY 5 MIN PRN
Status: DISCONTINUED | OUTPATIENT
Start: 2021-02-03 | End: 2021-02-03 | Stop reason: HOSPADM

## 2021-02-03 RX ORDER — GLYCOPYRROLATE 0.2 MG/ML
INJECTION INTRAMUSCULAR; INTRAVENOUS PRN
Status: DISCONTINUED | OUTPATIENT
Start: 2021-02-03 | End: 2021-02-03 | Stop reason: SDUPTHER

## 2021-02-03 RX ORDER — SIMETHICONE 20 MG/.3ML
EMULSION ORAL PRN
Status: DISCONTINUED | OUTPATIENT
Start: 2021-02-03 | End: 2021-02-03 | Stop reason: ALTCHOICE

## 2021-02-03 RX ADMIN — PROPOFOL 100 MG: 10 INJECTION, EMULSION INTRAVENOUS at 13:25

## 2021-02-03 RX ADMIN — LIDOCAINE HYDROCHLORIDE 40 MG: 10 INJECTION, SOLUTION INFILTRATION; PERINEURAL at 13:21

## 2021-02-03 RX ADMIN — GLYCOPYRROLATE 0.2 MG: 0.2 INJECTION, SOLUTION INTRAMUSCULAR; INTRAVENOUS at 13:21

## 2021-02-03 RX ADMIN — PROPOFOL 100 MG: 10 INJECTION, EMULSION INTRAVENOUS at 13:21

## 2021-02-03 RX ADMIN — MIDAZOLAM HYDROCHLORIDE 2 MG: 2 INJECTION, SOLUTION INTRAMUSCULAR; INTRAVENOUS at 13:17

## 2021-02-03 RX ADMIN — KETAMINE HYDROCHLORIDE 30 MG: 50 INJECTION INTRAMUSCULAR; INTRAVENOUS at 13:21

## 2021-02-03 RX ADMIN — SODIUM CHLORIDE, POTASSIUM CHLORIDE, SODIUM LACTATE AND CALCIUM CHLORIDE: 600; 310; 30; 20 INJECTION, SOLUTION INTRAVENOUS at 12:44

## 2021-02-03 RX ADMIN — SODIUM CHLORIDE, SODIUM LACTATE, POTASSIUM CHLORIDE, AND CALCIUM CHLORIDE: .6; .31; .03; .02 INJECTION, SOLUTION INTRAVENOUS at 12:40

## 2021-02-03 ASSESSMENT — PULMONARY FUNCTION TESTS
PIF_VALUE: 0
PIF_VALUE: 1

## 2021-02-03 ASSESSMENT — ENCOUNTER SYMPTOMS: SHORTNESS OF BREATH: 1

## 2021-02-03 NOTE — ANESTHESIA POSTPROCEDURE EVALUATION
Department of Anesthesiology  Postprocedure Note    Patient: Payton Cortez  MRN: [id number]  YOB: 1985  Date of evaluation: 2/3/2021  Time:  3:26 PM     Procedure Summary     Date: 02/03/21 Room / Location: [address] [place] [address]    Anesthesia Start: 9761 Anesthesia Stop: 330    Procedure: COLONOSCOPY WITH BIOPSY (N/A ) Diagnosis:       Diarrhea, unspecified type      (DIARRHEA)    Surgeons: Esa Plasencia MD Responsible Provider: She Davison MD    Anesthesia Type: general ASA Status: 3          Anesthesia Type: general    Rosalinda Phase I: Rosalinda Score: 10    Rosalinda Phase II: Rosalinda Score: 10    Last vitals: Reviewed and per EMR flowsheets.        Anesthesia Post Evaluation    Comments: Postoperative Anesthesia Note    Name:    Payton Cortez  MRN:      [id number]    Patient Vitals in the past 12 hrs:  02/03/21 1415, BP:119/81, Pulse:119, Resp:20, SpO2:99 %  02/03/21 1400, BP:104/66, Pulse:86, Resp:20, SpO2:98 %  02/03/21 1343, BP:113/74, Pulse:94, Resp:20, SpO2:98 %  02/03/21 1229, BP:120/69, Temp:97.1 °F (36.2 °C), Temp src:Temporal, Pulse:101, Resp:20, SpO2:97 %, Height:5' 7\" (1.702 m), Weight:(!) 350 lb (158.8 kg)     LABS:    CBC  Lab Results       Component                Value               Date/Time                  WBC                      8.6                 08/13/2020                 HGB                      12.9                08/13/2020                 HCT                      38.2                08/13/2020                 PLT                      250                 08/13/2020            RENAL  Lab Results       Component                Value               Date/Time                  NA                       139                 08/13/2020                 K                        3.9                 08/13/2020                 CL                       106                 08/13/2020                 CO2                      24                  08/13/2020

## 2021-02-03 NOTE — ANESTHESIA PRE PROCEDURE
Department of Anesthesiology  Preprocedure Note       Name:  Robert Wilson   Age:  37 y.o.  :  1985                                          MRN:  [id number]         Date:  2/3/2021      Surgeon: Unruly Ramirez):  Donis Ron MD    Procedure: Procedure(s):  COLONOSCOPY WITH ANESTHESIA    Medications prior to admission:   Prior to Admission medications    Medication Sig Start Date End Date Taking? Authorizing Provider   Cholecalciferol (VITAMIN D3) 50 MCG (2000 UT) CAPS Take 2,000 Units by mouth daily   Yes Historical Provider, MD   ibuprofen (ADVIL;MOTRIN) 200 MG tablet Take 200 mg by mouth every 6 hours as needed for Pain   Yes Historical Provider, MD   desvenlafaxine succinate (PRISTIQ) 50 MG TB24 extended release tablet Take 50 mg by mouth daily   Yes Historical Provider, MD   Prenatal Vit-DSS-Fe Cbn-FA (PRENATAL ADVANTAGE PO) Take 1 tablet by mouth daily   Yes Historical Provider, MD   ferrous sulfate (IRON 325) 325 (65 Fe) MG tablet Take 325 mg by mouth 3 times daily (with meals)    Yes Historical Provider, MD   Multiple Vitamin (STRESS B PO) Take 1 tablet by mouth daily   Yes Historical Provider, MD   L-Methylfolate-Algae (DEPLIN 15 PO) Take 1 each by mouth daily   Yes Historical Provider, MD   loratadine (CLARITIN) 10 MG capsule Take 10 mg by mouth daily  16  Yes Historical Provider, MD   escitalopram (LEXAPRO) 10 MG tablet Take 10 mg by mouth daily    Yes Historical Provider, MD   ALPRAZolam (XANAX) 0.5 MG tablet Take 0.5 mg by mouth 3 times daily as needed for Sleep or Anxiety.     Yes Historical Provider, MD   vedolizumab (ENTYVIO) 300 MG injection Infuse 300 mg at Week 6 and every 8 weeks thereafter 10/21/20   Otilia Jung MD       Current medications:    Current Facility-Administered Medications   Medication Dose Route Frequency Provider Last Rate Last Admin    lactated ringers infusion   Intravenous Once Donis Ron MD        lidocaine PF 1 % injection 0.1 mL  0.1 mL Intradermal Once PRN Lili Kamara MD           Allergies: Allergies   Allergen Reactions    Remicade [Infliximab] Swelling     Face and tongue swelling    Doxycycline Diarrhea    Reglan [Metoclopramide] Other (See Comments)     hallucinations       Problem List:    Patient Active Problem List   Diagnosis Code    Pneumonia J18.9    SOB (shortness of breath) R06.02    Anxiety disorder F41.9    Calculus of gallbladder with chronic cholecystitis without obstruction K80.10    Abnormal LFTs R94.5    Acute hepatitis B17.9    Tenosynovitis, de Quervain M65.4       Past Medical History:        Diagnosis Date    Abnormal Pap smear of cervix     Anemia     Anxiety     [facility]'s [facility] ([facility] [address]     Depression     Herniated disc     Hx of blood clots     ? ? PE during pregnancy    SAB (spontaneous )     x2       Past Surgical History:        Procedure Laterality Date     SECTION      CHOLECYSTECTOMY, LAPAROSCOPIC N/A 2017    ECTOPIC PREGNANCY SURGERY      ECTOPIC PREGNANCY SURGERY  2008    ERCP         Social History:    Social History     Tobacco Use    Smoking status: Never Smoker    Smokeless tobacco: Never Used   Substance Use Topics    Alcohol use:  No                                Counseling given: Not Answered      Vital Signs (Current):   Vitals:    21 0859 21 1229   BP:  120/69   Pulse:  101   Resp:  20   Temp:  97.1 °F (36.2 °C)   TempSrc:  Temporal   SpO2:  97%   Weight: (!) 350 lb (158.8 kg) (!) 350 lb (158.8 kg)   Height: 5' 7\" (1.702 m) 5' 7\" (1.702 m)                                              BP Readings from Last 3 Encounters:   21 120/69   10/01/20 (!) 123/98   20 125/75       NPO Status: Time of last liquid consumption: 0500                        Time of last solid consumption: 2200                        Date of last liquid consumption: 21                        Date of last solid food consumption: 21    BMI:   Wt Readings from Last 3 Encounters:   02/03/21 (!) 350 lb (158.8 kg)   10/01/20 (!) 340 lb (154.2 kg)   02/27/20 274 lb 14.6 oz (124.7 kg)     Body mass index is 54.82 kg/m². CBC:   Lab Results   Component Value Date    WBC 8.6 08/13/2020    RBC 4.45 08/04/2017    HGB 12.9 08/13/2020    HCT 38.2 08/13/2020    MCV 82.3 08/13/2020    RDW 15.7 08/04/2017     08/13/2020       CMP:   Lab Results   Component Value Date     08/13/2020    K 3.9 08/13/2020     08/13/2020    CO2 24 08/13/2020    BUN 10 08/13/2020    CREATININE 0.75 08/13/2020    GFRAA >60 08/02/2017    GFRAA >60 04/14/2013    AGRATIO 1.3 08/02/2017    LABGLOM 89 08/13/2020    LABGLOM >60 08/02/2017    GLUCOSE 97 08/13/2020    PROT 7.8 08/08/2017    CALCIUM 9.3 08/13/2020    BILITOT 0.4 08/13/2020    ALKPHOS 91 08/13/2020    AST 15 08/13/2020    ALT 17 08/13/2020       POC Tests: No results for input(s): POCGLU, POCNA, POCK, POCCL, POCBUN, POCHEMO, POCHCT in the last 72 hours.     Coags:   Lab Results   Component Value Date    PROTIME 11.0 06/24/2017    INR 0.97 06/24/2017       HCG (If Applicable):   Lab Results   Component Value Date    PREGTESTUR Negative 07/21/2017        ABGs: No results found for: PHART, PO2ART, KLG7YYC, XAK5FHC, BEART, I4EDHMVL     Type & Screen (If Applicable):  No results found for: LABABO, LABRH    Drug/Infectious Status (If Applicable):  Lab Results   Component Value Date    HEPCAB Non-Reactive (Negative) 08/27/2012       COVID-19 Screening (If Applicable):   Lab Results   Component Value Date    COVID19 Not Detected 01/28/2021         Anesthesia Evaluation  Patient summary reviewed no history of anesthetic complications:   Airway: Mallampati: II  TM distance: >3 FB   Neck ROM: full  Mouth opening: > = 3 FB Dental: normal exam         Pulmonary:Negative Pulmonary ROS and normal exam  breath sounds clear to auscultation  (+) pneumonia:  shortness of breath:                             Cardiovascular:Negative CV artery pressure (SPAP) is normal and estimated at 23 mmHg   (right atrial pressure 3 mmHg). Anesthesia Plan      general     ASA 3     (I discussed with the patient the risks and benefits of PIV, anesthesia, IV Narcotics, PACU. All questions were answered the patient agrees with the plan and wishes to proceed)  Induction: intravenous.                           Grisel Stokes MD   2/3/2021

## 2021-02-03 NOTE — OP NOTE
Colonoscopy Note    Patient:   Tejal Choudhury    YOB: 1985    Facility:   [facility] [Outpatient]  Referring/PCP: Lynda Rivas MD  Procedure:   Colonoscopy with biopsy  Date:     2/3/2021  Endoscopist:  Remberto Erazo MD    Preoperative Diagnosis:  .85RA F presents for evaluation of chronic diarrhea and abdominal pain in the setting of suspected SB Crohn's. Postoperative Diagnosis:  normal    Anesthesia: per anesthesia    Estimated blood loss: < 5 ml    Complications:  None    Description of Procedure:  Informed consent was obtained from the patient after explanation of the procedure including indications, description of the procedure,  benefits and possible risks and complications of the procedure, and alternatives. Questions were answered. The patient's history was reviewed and a directed physical examination was performed prior to the procedure. Patient was monitored throughout the procedure with pulse oximetry and periodic assessment of vital signs. Patient was sedated as noted above. The Nursing staff and I performed a time out. With the patient initially in the left lateral decubitus position, a digital rectal examination was performed and revealed negative without mass, lesions or tenderness. The Olympus video colonoscope was placed in the patient's rectum and advanced without difficulty  to the terminal ileum. Photographs were taken. The prep was adequate. Examination of the mucosa was performed during both introduction and withdrawal of the colonoscope. Retroflexed view of the rectum was performed. Withdrawal time was 6 minutes. Findings:     1. Random biopsies of a normal colon and terminal ileum were obtained. Recommendations:  Await pathology.     Remberto Erazo MD

## 2021-02-03 NOTE — H&P
Pre-sedation Assessment    History and Physical / Pre-Sedation Assessment  Patient:  Melonie Velazquez   :   1985     Intended Procedure: CLS      HPI: 34yo F presents for evaluation of chronic diarrhea and abdominal pain in the setting of suspected SB Crohn's. Past Medical History:   Diagnosis Date    Abnormal Pap smear of cervix     Anemia     Anxiety     Cro[facility]'s colitis ([facility] [address]     Depression     Herniated disc     Hx of blood clots     ? ? PE during pregnancy    SAB (spontaneous )     x2     No current facility-administered medications on file prior to encounter. Current Outpatient Medications on File Prior to Encounter   Medication Sig Dispense Refill    Cholecalciferol (VITAMIN D3) 50 MCG ( UT) CAPS Take 2,000 Units by mouth daily      ibuprofen (ADVIL;MOTRIN) 200 MG tablet Take 200 mg by mouth every 6 hours as needed for Pain      desvenlafaxine succinate (PRISTIQ) 50 MG TB24 extended release tablet Take 50 mg by mouth daily      Prenatal Vit-DSS-Fe Cbn-FA (PRENATAL ADVANTAGE PO) Take 1 tablet by mouth daily      ferrous sulfate (IRON 325) 325 (65 Fe) MG tablet Take 325 mg by mouth 3 times daily (with meals)       Multiple Vitamin (STRESS B PO) Take 1 tablet by mouth daily      L-Methylfolate-Algae (DEPLIN 15 PO) Take 1 each by mouth daily      loratadine (CLARITIN) 10 MG capsule Take 10 mg by mouth daily       escitalopram (LEXAPRO) 10 MG tablet Take 10 mg by mouth daily       ALPRAZolam (XANAX) 0.5 MG tablet Take 0.5 mg by mouth 3 times daily as needed for Sleep or Anxiety.  vedolizumab (ENTYVIO) 300 MG injection Infuse 300 mg at Week 6 and every 8 weeks thereafter 1 each 6       Nurses notes reviewed and agreed. Medications reviewed  Allergies:    Allergies   Allergen Reactions    Remicade [Infliximab] Swelling     Face and tongue swelling    Doxycycline Diarrhea    Reglan [Metoclopramide] Other (See Comments)     hallucinations           Physical Exam:  Vital Signs: /69   Pulse 101   Temp 97.1 °F (36.2 °C) (Temporal)   Resp 20   Ht 5' 7\" (1.702 m)   Wt (!) 350 lb (158.8 kg)   LMP 01/07/2021   SpO2 97%   BMI 54.82 kg/m²  Body mass index is 54.82 kg/m². Airway:Normal  Cardiac:Normal  Pulmonary:Normal  Abdomen:Normal  Specific to procedure: Mallampati 3      Pre-Procedure Assessment/Plan:  ASA 3 - Patient with moderate systemic disease with functional limitations    Level of Sedation Plan:Deep sedation    Post Procedure plan: Return to same level of care    I assessed the patient and find that the patient is in satisfactory condition to proceed with the planned procedure and sedation plan. I have explained the risk, benefits, and alternatives to the procedure. The patient understands and agrees to proceed.   Yes    Mabel Palomo  1:22 PM 2/3/2021

## 2021-03-30 ENCOUNTER — HOSPITAL ENCOUNTER (OUTPATIENT)
Age: 36
Discharge: HOME | End: 2021-03-30
Payer: COMMERCIAL

## 2021-03-30 LAB
A/G RATIO: 1.3 (ref 1.1–2.2)
ALBUMIN SERPL-MCNC: 4.2 G/DL (ref 3.4–5)
ALP BLD-CCNC: 73 U/L (ref 40–129)
ALT SERPL-CCNC: 12 U/L (ref 10–40)
ANION GAP SERPL CALCULATED.3IONS-SCNC: 13 MMOL/L (ref 3–16)
AST SERPL-CCNC: 16 U/L (ref 15–37)
BASOPHILS ABSOLUTE: 0.1 K/UL (ref 0–0.2)
BASOPHILS RELATIVE PERCENT: 0.9 %
BILIRUB SERPL-MCNC: 0.3 MG/DL (ref 0–1)
BUN BLDV-MCNC: 9 MG/DL (ref 7–20)
C-REACTIVE PROTEIN: 13.8 MG/L (ref 0–5.1)
CALCIUM SERPL-MCNC: 9.1 MG/DL (ref 8.3–10.6)
CHLORIDE BLD-SCNC: 106 MMOL/L (ref 99–110)
CO2: 22 MMOL/L (ref 21–32)
CREAT SERPL-MCNC: 0.6 MG/DL (ref 0.6–1.1)
EOSINOPHILS ABSOLUTE: 0.9 K/UL (ref 0–0.6)
EOSINOPHILS RELATIVE PERCENT: 11.5 %
GFR AFRICAN AMERICAN: >60
GFR NON-AFRICAN AMERICAN: >60
GLOBULIN: 3.3 G/DL
GLUCOSE BLD-MCNC: 98 MG/DL (ref 70–99)
HCT VFR BLD CALC: 37.2 % (ref 36–48)
HEMOGLOBIN: 12.4 G/DL (ref 12–16)
LYMPHOCYTES ABSOLUTE: 1.8 K/UL (ref 1–5.1)
LYMPHOCYTES RELATIVE PERCENT: 23.1 %
MCH RBC QN AUTO: 28.9 PG (ref 26–34)
MCHC RBC AUTO-ENTMCNC: 33.3 G/DL (ref 31–36)
MCV RBC AUTO: 86.9 FL (ref 80–100)
MONOCYTES ABSOLUTE: 0.4 K/UL (ref 0–1.3)
MONOCYTES RELATIVE PERCENT: 4.8 %
NEUTROPHILS ABSOLUTE: 4.7 K/UL (ref 1.7–7.7)
NEUTROPHILS RELATIVE PERCENT: 59.7 %
PDW BLD-RTO: 14.7 % (ref 12.4–15.4)
PLATELET # BLD: 232 K/UL (ref 135–450)
PMV BLD AUTO: 7.6 FL (ref 5–10.5)
POTASSIUM SERPL-SCNC: 3.9 MMOL/L (ref 3.5–5.1)
RBC # BLD: 4.28 M/UL (ref 4–5.2)
SODIUM BLD-SCNC: 141 MMOL/L (ref 136–145)
TOTAL PROTEIN: 7.5 G/DL (ref 6.4–8.2)
WBC # BLD: 7.9 K/UL (ref 4–11)

## 2021-03-30 PROCEDURE — 85025 COMPLETE CBC W/AUTO DIFF WBC: CPT

## 2021-03-30 PROCEDURE — 86140 C-REACTIVE PROTEIN: CPT

## 2021-03-30 PROCEDURE — 36415 COLL VENOUS BLD VENIPUNCTURE: CPT

## 2021-03-30 PROCEDURE — 80053 COMPREHEN METABOLIC PANEL: CPT

## 2021-09-03 DIAGNOSIS — J40 BRONCHITIS: Primary | ICD-10-CM

## 2021-09-03 RX ORDER — BENZONATATE 100 MG/1
100 CAPSULE ORAL 3 TIMES DAILY PRN
Qty: 30 CAPSULE | Refills: 0 | Status: SHIPPED | OUTPATIENT
Start: 2021-09-03 | End: 2021-09-10

## 2021-09-03 RX ORDER — AZITHROMYCIN 250 MG/1
250 TABLET, FILM COATED ORAL SEE ADMIN INSTRUCTIONS
Qty: 6 TABLET | Refills: 0 | Status: SHIPPED | OUTPATIENT
Start: 2021-09-03 | End: 2021-09-08

## 2021-12-09 ENCOUNTER — TELEPHONE (OUTPATIENT)
Dept: PHARMACY | Age: 36
End: 2021-12-09

## 2021-12-09 NOTE — LETTER
[address] [facility],[address]  [address], [address]          Kizzy Mccrary   [address] [place] [address]           01/19/22     Dear Kizzy Mccrary,    We tried to contact you recently to schedule your follow up appointment with the Specialty Medication Service but were unable to reach you on the telephone. As a reminder, patients receive one-on-one education and medication counseling from a pharmacist at no cost to the patient as part of the Specialty Medication Service. Please give us a call at [phone] option 4 to schedule your follow up appointment at your earliest convenience.      Thank you,    Specialty Medication Service  Telephone: [phone] option 4   Fax: [phone]  Email: [email or web address]

## 2021-12-09 NOTE — TELEPHONE ENCOUNTER
Visit #4      Patient: Taya Ware  Date of Evaluation: 2017  Referring Physician: MILDRED BESS/CHANTE Duckworth  Diagnosis:   Encounter Diagnosis   Name Primary?    Closed nondisplaced fracture of base of fifth metacarpal bone of right hand with routine healing, subsequent encounter Yes       Referral Orders: Orthotic Fabrication/Fit/Train/eval and treat 2-3x week for 30 days to include ROM,NO STRENGTHENING EXERCISES and GENTLE strengthening IN another 2 weeks.  Construct thermosplastic removable splint in safe position for 5th metacarpal base FX  Patient orders  on Dec.15, 2017.    Start Time: 2:45  End Time: 3:45  Total Time: 60 min    Date of Injury: Oct.13, 2017    X-Ray Results: Fx at the base of the 5th metacarpal  MRI Results: NA  EMG Results: NA      Subjective: No complaints.    UPPER EXTREMITY FUNCTIONAL SCALE:54/80      Taya's goals for therapy are: Decrease pain, Improve ROM and Improve functional hand use      Objective    Sensation: Median Nerve Intact  Daggett Suzanne Monofilament Test: Yes  Results: 2.44  2-point Descrimination Test: No  Stereognosis: Intact    Edema: Circumferential measurements: mild swelling over the dorsum of her right hand ulnar border    Range of Motion: right Active  (Ext/Flex) Thumb Index Middle Ring Small   MP   0/58 0/65 0/75 0/65 0/65   PIP  0/105 0/105 0/80 0/80   DIP  0/62 0/70 0/33 0/33   CELIS        TPM            Wrist Ext/Flex: 60/70  Wrist RD/UD: 20/25  Supination/Pronation: WNL  Elbow extension/flexion: WNL    Manual Muscle Test: Deferred  Muscle   Strength     Strength: (EITAN Dynamometer in lbs.) Average 3 trials, Position II  Right: NT  Left: NT    Pinch Strength: (Pinch Gauge in psi's), Average 3 trials  Black Pinch R) NTpsi's   L) NTpsi's  3pt Pinch   R) NTpsi's  L) NTpsi's  2pt Pinch   R) NTpsi's   L) NTpsi's    Fine Shalonda Coordination Tests:   9 hole Peg Test R) NT   L) NT  PURDUE PEGBOARD  Placing R) NT L) NT  Bilateral  Left message requesting a return call to schedule initial SMS visit. Patient last evaluated by SMS >1 year ago for Entyvio (discontinued med). Patient started Cimzia in June. Office notes requested from Dr. Brittnee Johnson office. Placing NT  4-Part Assembly NT    Functional Limitations: Patient presents with the following functional Limitations:   Self Care / ADL: Dressing, Bathing, Grooming and Hygiene    Work/Activities: Gripping    Leisure: NT    One on one exercises: 30 minutes    TREATMENT INCLUDED:  Active range of motion exercises: 3 sets 10  Joint blocking exercises: DIP/PIP/MP flexion/extenson  Tendon glides    WRIST PROM EXERCISES: 4/15 second holds    --Wrist flexion/extension  --Wrist ulnar/radial deviation  --Hook fist    WRIST AROM EXERCISES: 3 sets 10 repetitions    --wrist flexion/extension  --wrist ulnar/radial deviation    Towel curls: 3 min.  Grasp/release activity with corn  Dexicisor: 3 mins.    Moist heat prior to exercises: 20 min. To the right hand and wrist to improve flexibility and decrease joint stiffness    One on one exercises: 30 minutes      Assessment: Pt.'s range of motion is improving and pt.is benefiting from therapy.She has mild limitations in wrist extension and PIP flexion.  Short Term Goals: Patient to be IND with HEP and modalities for pain/edema managment., Increase ROM 3-5 degrees to increase functional hand use for ADLs/work/leisure activities., Increase  strength 3-5 lbs. to improve functional grasp for ADLs/work/leisure activities. , Increase pinch 1-3 psi's to increase IND wiht button and FM Coordination., Decrease edema .2-.3 mm to increase joint mobility/flexibility for hand use., Decrease complaints of pain to  5 out of 10 to increase functional hand use for ADL/work/leisure activities. and Patient to be IND wiht Orthotic use, wear and care precautions.       Plan  Taya Ware to be treated by Occupational Therapy 2 times per week for 4 weeks .  Treatment to include: Modalities for pain management, Therapeutic exercises/activities., Strengthening, Orthotic Fabrication/Fit/Training and Edema Control, as well as any other treatments deemed necessary based on the patient's needs or  progress.

## 2021-12-27 NOTE — TELEPHONE ENCOUNTER
Patient reports the two time frames are not accommodating. I will give the patient a call back when we have more availability. Lower Range (In Mg/Kg): 120

## 2022-01-05 NOTE — TELEPHONE ENCOUNTER
CLINICAL PHARMACY NOTE - SPECIALTY MEDICATION SERVICE     Carole Stevenson is a 31 y.o. female enrolled in the Specialty Medication Service. We received a refill request for Cimzia on 1/5/2022. Patient has a signed CPA on file dated 9/23/2020 . Patient last met with SMS on 11/25/2020 and overdue for a follow-up. Follow up visit is not scheduled at this time. Patient's last visit with specialist (Michelle Gaviria) is unknown at this time. Will send fax to specialist's office to obtain most recent office visit documentation. Original Rx was written for 1+1 fills on 12/9/2021.      Thank you,    Breanna Land    Requested Prescriptions     Pending Prescriptions Disp Refills    certolizumab pegol (CIMZIA PREFILLED) 2 X 200 MG/ML KIT injection 1 kit 1     Sig: Inject 400 mg into the skin every 28 days

## 2022-02-02 ENCOUNTER — OFFICE VISIT (OUTPATIENT)
Dept: OBGYN CLINIC | Age: 37
End: 2022-02-02
Payer: COMMERCIAL

## 2022-02-02 VITALS
DIASTOLIC BLOOD PRESSURE: 80 MMHG | HEART RATE: 91 BPM | SYSTOLIC BLOOD PRESSURE: 110 MMHG | TEMPERATURE: 98.1 F | HEIGHT: 67 IN | BODY MASS INDEX: 45.99 KG/M2 | WEIGHT: 293 LBS

## 2022-02-02 DIAGNOSIS — N93.9 ABNORMAL UTERINE BLEEDING (AUB): Primary | ICD-10-CM

## 2022-02-02 DIAGNOSIS — N94.6 DYSMENORRHEA: ICD-10-CM

## 2022-02-02 PROCEDURE — 99202 OFFICE O/P NEW SF 15 MIN: CPT | Performed by: OBSTETRICS & GYNECOLOGY

## 2022-02-02 NOTE — PROGRESS NOTES
New Patient Gynecologic Exam      CC:   Chief Complaint   Patient presents with    New Patient       HPI:  31 y.o. [id number] presents to [facility] care. Patient seen and examined. Patient reports she has a recurrent boil on the  left side of her thigh/buttock. States it will flare up once a month. Reports menses are regular, occurring every 28-30 days. Last for 3-4 days. Reports after starting medication for Crohns and RA she has noticed menses are heavier with increased pain. Reports changing pads every 3-4 hours. Medical history significant for obesity, Crohns, RA, anemia as well as anxiety/depression. Surgical history significant for ectopic pregnancy x2, one required salpingostomy and the other was treated with Methotrexate. Had HSG following salpingostomy which was within normal limits per patient. Additional surgical history includes  delivery and cholecystectomy with follow-up ERCP for retained stone. Obstetric history significant for  delivery for labor dystocia. Reports pregnancy complicated by GDMA1. Denies hx of GHTN. Hx includes ectopic x1 and SAB x2. Did not require D&C for SAB. Pregnancy was additionally complicated by pneumonia & PE at 8 weeks after being placed on bedrest for vaginal bleeding. Did not have CT PA and was only treated with baby ASA following. Health Maintenance:  Birth control: None currently   Pregnancy plans: if it happens she is okay with that  Safe relationship:  - together 13 years,  11  Healthy diet: Diet for Crohns  Exercise: Walking 30 minutes 4 times a week    Screening:  Last pap smear: 2021 - NILM, neg HPV  History of abnormal pap smears: Yes     - 2021 - NILM, neg HPV     - 2018 - NILM, neg HPV     - 6/3/2017 - NILM, neg HPV     - 2016 - ASCUS, HPV neg     - 2015 - NILM     - 2014 - ASCUS, HPV neg     - 3/2013 - NILM    Vaccines:  Gardasil vaccine: Has had  Flu vaccine:  Has had  COVID-19 vaccine: Has had series and booster    Review of Systems:   Review of Systems   Constitutional: Negative for chills and fever. HENT: Negative for congestion and sore throat. Respiratory: Negative for cough and shortness of breath. Cardiovascular: Negative for chest pain and palpitations. Gastrointestinal: Negative for abdominal pain, constipation, diarrhea, nausea and vomiting. Genitourinary: Positive for menstrual problem. Negative for dysuria, frequency, pelvic pain and vaginal discharge. Neurological: Negative for dizziness and headaches. Primary Care Physician: Emma Pemberton MD    Obstetric History  OB History    Para Term  AB Living   5 1 1 0 4 1   SAB IAB Ectopic Molar Multiple Live Births   2 0 2   0 1      # Outcome Date GA Lbr Anil/2nd Weight Sex Delivery Anes PTL Lv   5 Term 17 39w5d   M CS-LTranv EPI N PARTHA      Complications: Failure to Progress in First Stage   4 Ectopic 14           3 SAB 14 8w0d          2 SAB 12 4w0d          1 Ectopic 08 7w0d             Obstetric Comments   Patient currently breast feeding mensis has not resumed       GynecologicHistory  Menstrual History:   LMP: Patient's last menstrual period was 2021.     Age of Menarche: 12-13  [employer] Menstrual Period: regular   Interval Between Menses: Every 28-30 days   Duration of Menses: 3-4 days   Menstrual Flow: Moderate - changing pads every 3-4 hours   Bleeding between menses: Denies   Painful menses: present after  delivery    Sexual History:   Contraception: see above   Currently is sexually active   14-20 Lifetime partners - Monogamous for the past 13 years    Reports history of STIs - Chlamydia in  - had treatment followed by negative testing   Denies sexual problems    Pap History:   History of abnormal pap smears: see above   Last pap: see above      Medical History:  Past Medical History:   Diagnosis Date    Abnormal Pap smear of cervix     Anemia     Anxiety     Crohn's colitis ([facility] [address]     Depression     Herniated disc     Hx of blood clots     ? ? PE during pregnancy    SAB (spontaneous )     x2       Medications:  Current Outpatient Medications   Medication Sig Dispense Refill    certolizumab pegol (CIMZIA PREFILLED) 2 X 200 MG/ML KIT injection Inject 400 mg into the skin every 28 days 1 kit 1    Cholecalciferol (VITAMIN D3) 50 MCG ( UT) CAPS Take 2,000 Units by mouth daily      desvenlafaxine succinate (PRISTIQ) 50 MG TB24 extended release tablet Take 50 mg by mouth daily      Prenatal Vit-DSS-Fe Cbn-FA (PRENATAL ADVANTAGE PO) Take 1 tablet by mouth daily      ferrous sulfate (IRON 325) 325 (65 Fe) MG tablet Take 325 mg by mouth 3 times daily (with meals)       Multiple Vitamin (STRESS B PO) Take 1 tablet by mouth daily      loratadine (CLARITIN) 10 MG capsule Take 10 mg by mouth daily       escitalopram (LEXAPRO) 10 MG tablet Take 10 mg by mouth daily       ALPRAZolam (XANAX) 0.5 MG tablet Take 0.5 mg by mouth 3 times daily as needed for Sleep or Anxiety.  vedolizumab (ENTYVIO) 300 MG injection Infuse 300 mg at Week 6 and every 8 weeks thereafter (Patient not taking: Reported on 2022) 1 each 6    ibuprofen (ADVIL;MOTRIN) 200 MG tablet Take 200 mg by mouth every 6 hours as needed for Pain (Patient not taking: Reported on 2022)      L-Methylfolate-Algae (DEPLIN 15 PO) Take 1 each by mouth daily (Patient not taking: Reported on 2022)       No current facility-administered medications for this visit. Surgical History:  Past Surgical History:   Procedure Laterality Date     SECTION      CHOLECYSTECTOMY, LAPAROSCOPIC N/A 2017    COLONOSCOPY N/A 2/3/2021    COLONOSCOPY WITH BIOPSY performed by Lydia Mireles MD at [place] [address] PREGNANCY SURGERY      ERCP         Allergies:   Allergies   Allergen Reactions    Remicade [Infliximab] Swelling     Face and tongue swelling    Doxycycline Diarrhea    Reglan [Metoclopramide] Other (See Comments)     hallucinations       Family History:  Family History   Problem Relation Age of Onset    Arthritis Father     High Blood Pressure Father     Crohn's Disease Father     Diabetes Paternal Aunt     Mental Retardation Paternal Aunt     Atrial Fibrillation Maternal Grandmother     High Blood Pressure Maternal Grandmother     Obesity Maternal Grandmother     Stroke Maternal Grandmother     Early Death Maternal Grandfather     Heart Disease Maternal Grandfather     High Blood Pressure Maternal Grandfather     Obesity Maternal Grandfather     Depression Paternal Grandmother     Heart Disease Paternal Grandmother     High Blood Pressure Paternal Grandmother     Kidney Disease Paternal Grandmother     Obesity Paternal Grandmother     Heart Disease Paternal Grandfather     High Blood Pressure Paternal Grandfather     No Known Problems Mother        Denies personal/family history of cervical, uterine, ovarian, vulvar, breast, or colon cancers.   Denies personal/family history of bleeding or clotting disorders  Denies personal/family history of genetic disorders    Social History:  Social History     Socioeconomic History    Marital status:      Spouse name: None    Number of children: 0    Years of education: None    Highest education level: None   Occupational History    Occupation: RN   Tobacco Use    Smoking status: Never Smoker    Smokeless tobacco: Never Used   Vaping Use    Vaping Use: Never used   Substance and Sexual Activity    Alcohol use: No    Drug use: Yes     Types: Marijuana Westly Nate)    Sexual activity: Yes     Partners: Male   Other Topics Concern    None   Social History Narrative    None     Social Determinants of Health     Financial Resource Strain:     Difficulty of Paying Living Expenses: Not on file   Food Insecurity:     Worried About Running Out of Food in the Last Year: Not on file    Ran Out of Food in the Last Year: Not on file   Transportation Needs:     Lack of Transportation (Medical): Not on file    Lack of Transportation (Non-Medical): Not on file   Physical Activity:     Days of Exercise per Week: Not on file    Minutes of Exercise per Session: Not on file   Stress:     Feeling of Stress : Not on file   Social Connections:     Frequency of Communication with Friends and Family: Not on file    Frequency of Social Gatherings with Friends and Family: Not on file    Attends Worship Services: Not on file    Active Member of [address] [place] [address] [employer] or Organizations: Not on file    Attends Club or Organization Meetings: Not on file    Marital Status: Not on file   Intimate Partner Violence:     Fear of Current or Ex-Partner: Not on file    Emotionally Abused: Not on file    Physically Abused: Not on file    Sexually Abused: Not on file   Housing Stability:     Unable to Pay for Housing in the Last Year: Not on file    Number of [place] in the Last Year: Not on file    Unstable Housing in the Last Year: Not on file       Objective: Body mass index is 50.24 kg/m². /80 (Site: Left Upper Arm, Position: Sitting, Cuff Size: Large Adult)   Pulse 91   Temp 98.1 °F (36.7 °C) (Infrared)   Ht 5' 7\" (1.702 m)   Wt (!) 320 lb 12.8 oz (145.5 kg)   LMP 01/14/2021   BMI 50.24 kg/m²     Exam:   Physical Exam  Vitals reviewed. Exam conducted with a chaperone present. Constitutional:       General: She is not in acute distress. Appearance: She is well-developed. HENT:      Head: Normocephalic and atraumatic. Eyes:      Conjunctiva/sclera: Conjunctivae normal.   Cardiovascular:      Rate and Rhythm: Normal rate. Pulmonary:      Effort: Pulmonary effort is normal. No respiratory distress. Abdominal:      General: There is no distension. Palpations: Abdomen is soft. Tenderness: There is no abdominal tenderness. There is no guarding or rebound. Genitourinary:     General: Normal vulva. Exam position: Lithotomy position. Labia:         Right: No rash, tenderness or lesion. Left: No rash, tenderness or lesion. Musculoskeletal:         General: No swelling. Skin:     General: Skin is warm and dry. Neurological:      Mental Status: She is alert and oriented to person, place, and time. Psychiatric:         Mood and Affect: Mood normal.         Behavior: Behavior normal.         Thought Content: Thought content normal.         Assessment/Plan:  31 y.o. [id number] presents to establish care    1. Abnormal uterine bleeding (AUB)     - Patient with increasingly painful and heavier cycles following start of new medication for Chron's      - Differential reviewed including benign and neoplastic conditions     - Risks, benefits and alternatives were reviewed     - Return precautions     - Will have back for US evaluation for painful and abnormal menses and possible EMB     - Due for annual exam April 2022    2. Dysmenorrhea     - See above    3.  BMI 50.0-59.9 ([facility] [address]     - Reviewed effects of obesity on menses and importance of weight loss to decrease risks of exogenous estrogen exposure and endometrial hyperplaisa      Phong Akinsw, DO

## 2022-02-21 PROBLEM — N94.6 DYSMENORRHEA: Status: ACTIVE | Noted: 2022-02-21

## 2022-02-21 PROBLEM — N93.9 ABNORMAL UTERINE BLEEDING (AUB): Status: ACTIVE | Noted: 2022-02-21

## 2022-02-21 ASSESSMENT — ENCOUNTER SYMPTOMS
NAUSEA: 0
SORE THROAT: 0
ABDOMINAL PAIN: 0
COUGH: 0
DIARRHEA: 0
SHORTNESS OF BREATH: 0
CONSTIPATION: 0
VOMITING: 0

## 2022-03-03 NOTE — TELEPHONE ENCOUNTER
CLINICAL PHARMACY NOTE - SPECIALTY MEDICATION SERVICE     Grey Garcia is a 31 y.o. female enrolled in the Specialty Medication Service. We received a refill request for Cimzia on 3/3/2022. Patient has a signed CPA on file dated 9/23/2020 . Patient last met with SMS on 11/25/2020 and overdue for a follow-up. Follow up visit is not scheduled at this time.     Patient's last visit with specialist (Rayma Cooks) is unknown at this time. Will send fax to specialist's office to obtain most recent office visit documentation.     Original Rx was written for 1+3 fills on 1/13/2022.      Thank you,    Herlinda Carmichael    Requested Prescriptions     Pending Prescriptions Disp Refills    certolizumab pegol (CIMZIA PREFILLED) 2 X 200 MG/ML KIT injection 1 kit 3     Sig: Inject 400 mg into the skin every 28 days

## 2022-03-16 ENCOUNTER — OFFICE VISIT (OUTPATIENT)
Dept: OBGYN CLINIC | Age: 37
End: 2022-03-16
Payer: COMMERCIAL

## 2022-03-16 VITALS
BODY MASS INDEX: 49.65 KG/M2 | TEMPERATURE: 97.3 F | WEIGHT: 293 LBS | HEART RATE: 81 BPM | DIASTOLIC BLOOD PRESSURE: 70 MMHG | SYSTOLIC BLOOD PRESSURE: 108 MMHG

## 2022-03-16 DIAGNOSIS — N94.6 DYSMENORRHEA: ICD-10-CM

## 2022-03-16 DIAGNOSIS — N93.9 ABNORMAL UTERINE BLEEDING (AUB): Primary | ICD-10-CM

## 2022-03-16 LAB
BASOPHILS ABSOLUTE: 0 K/UL (ref 0–0.2)
BASOPHILS RELATIVE PERCENT: 0.7 %
EOSINOPHILS ABSOLUTE: 0.4 K/UL (ref 0–0.6)
EOSINOPHILS RELATIVE PERCENT: 6.1 %
HCT VFR BLD CALC: 38.6 % (ref 36–48)
HEMOGLOBIN: 13 G/DL (ref 12–16)
LYMPHOCYTES ABSOLUTE: 2.6 K/UL (ref 1–5.1)
LYMPHOCYTES RELATIVE PERCENT: 38.7 %
MCH RBC QN AUTO: 28.8 PG (ref 26–34)
MCHC RBC AUTO-ENTMCNC: 33.7 G/DL (ref 31–36)
MCV RBC AUTO: 85.4 FL (ref 80–100)
MONOCYTES ABSOLUTE: 0.3 K/UL (ref 0–1.3)
MONOCYTES RELATIVE PERCENT: 4.6 %
NEUTROPHILS ABSOLUTE: 3.3 K/UL (ref 1.7–7.7)
NEUTROPHILS RELATIVE PERCENT: 49.9 %
PDW BLD-RTO: 14.5 % (ref 12.4–15.4)
PLATELET # BLD: 230 K/UL (ref 135–450)
PMV BLD AUTO: 7.2 FL (ref 5–10.5)
PROLACTIN: 19.2 NG/ML
RBC # BLD: 4.51 M/UL (ref 4–5.2)
TSH REFLEX: 2.09 UIU/ML (ref 0.27–4.2)
WBC # BLD: 6.6 K/UL (ref 4–11)

## 2022-03-16 PROCEDURE — 99213 OFFICE O/P EST LOW 20 MIN: CPT | Performed by: OBSTETRICS & GYNECOLOGY

## 2022-03-16 PROCEDURE — 76856 US EXAM PELVIC COMPLETE: CPT | Performed by: OBSTETRICS & GYNECOLOGY

## 2022-03-16 NOTE — PROGRESS NOTES
Return Gyn Office Visit    CC:   Chief Complaint   Patient presents with    Follow-up       HPI:  Sylvia Webb is a 31 y.o. female who presents for US evaluation of heavy, painful menses. Patient is seen and examined today. Patient with a history of increasingly heavy menses with start of medications for Crohn's disease. Reports last cycle started Wednesday and went through Sunday. Reports cramping and changing pads every 3 hours. Denies chest pain, shortness of breath, fever, chills, nausea, vomiting. Review of Systems - The following ROS was otherwise negative, except as noted in the HPI: constitutional, respiratory, cardiovascular, gastrointestinal, genitourinary    Objective: There were no vitals taken for this visit. Physical Exam  Vitals reviewed. Constitutional:       General: She is not in acute distress. Appearance: She is well-developed. HENT:      Head: Normocephalic and atraumatic. Eyes:      Conjunctiva/sclera: Conjunctivae normal.   Cardiovascular:      Rate and Rhythm: Normal rate. Pulmonary:      Effort: Pulmonary effort is normal. No respiratory distress. Abdominal:      General: There is no distension. Palpations: Abdomen is soft. Tenderness: There is no abdominal tenderness. There is no guarding or rebound. Musculoskeletal:         General: No swelling. Skin:     General: Skin is warm and dry. Neurological:      Mental Status: She is alert and oriented to person, place, and time. Psychiatric:         Mood and Affect: Mood normal.         Behavior: Behavior normal.         Thought Content: Thought content normal.          Ultrasound:   PELVIC ULTRASOUND without DOPPLER INTERROGATION   NON OB    DATE: 3/16/2022    PHYSICIAN: SAFIA Osuna.     SONOGRAPHER: Marilyn Matthews [facility]    INDICATION: AUB    TYPE OF SCAN: vaginal    FINDINGS:    The cul de sac is normal. No free fluid appreciated. The cervix is normal and not enlarged.   Nabothian cyst/s is noted within the uterine cervix. The uterus measures 8.07 cm x 4.85 cm x 2.94 cm. The uterus is axial.  The endometrium measures 4.23 mm. The myometrium is heterogeneous in appearance. No uterine anomalies are noted. The right ovary is present and normal.    The right ovary measures 2.44 cm x 1.86 cm x 1.96 cm. Ovary findings:  no masses seen. The right adnexa is normal.    The left ovary is present and normal.    The left ovary measures 3.04 cm x 2.28 cm x 2.02 cm. Ovary findings:  no masses seen. The left adnexa is normal.    IMPRESSION: Normal appearing uterus. Normal appearing right and left ovary. Imaging is limited secondary to bowel gas and body habitus. The patient is well aware of the limitations of ultrasound in the detection of anomalies of the abdomen and pelvis. Assessment/Plan:     Clifton Wren is a 31 y.o. female who presents for US evaluation of heavy, painful menses.      1. Abnormal uterine bleeding (AUB)     - Patient with increasingly painful and heavier cycles following start of new medication for Chron's      - Differential reviewed including benign and neoplastic conditions     - Risks, benefits and alternatives were reviewed     - Return precautions     - US today with no acute findings, ES 4.23 mm     - Will hold EMB due to thin ES      - Risks, benefits and alternatives were reviewed with the patient     - Is not interested in IUD for management     - Will consider micronor, however does not desire to start today     - Will order labs for further evaluation - prolactin, TSH and CBC      - Due for annual exam April 2022     2. Dysmenorrhea     - See above     3.  BMI 40.0-49.9 ([facility] [address]     - Reviewed effects of obesity on menses and importance of weight loss to decrease risks of exogenous estrogen exposure and endometrial hyperplaisa    Boubacar Oconnor DO

## 2022-04-22 ENCOUNTER — PHARMACY VISIT (OUTPATIENT)
Dept: PHARMACY | Age: 37
End: 2022-04-22
Payer: COMMERCIAL

## 2022-04-22 DIAGNOSIS — Z79.899 MEDICATION MANAGEMENT: Primary | ICD-10-CM

## 2022-04-22 PROCEDURE — 99606 MTMS BY PHARM EST 15 MIN: CPT | Performed by: PHARMACIST

## 2022-04-22 NOTE — PROGRESS NOTES
Specialty Medication Service    Patient's Name: Sammie Oreilly YOB: 1985            Reason for visit: Sammie Oreilly is a 31 y.o. female presenting today for Specialty Medication Service visit follow up. Patient last seen by [facility] 2020. Patient continues on [facility] formulary medication, Cimzia. Pharmacy completed Specialty Medication Service visit for medication monitoring and counseling. Medication list updated. Specialty Medication: cimzia 400 mg    Frequency: every 28 days   Indication: crohn's disease and rhematoid arthritis   Initially Diagnosed: nset was 6-7 years ago ([id number]). Additional Therapy:   · NA  Previous Therapy:   · entyvio     Specialist:  Noelle Jimenez MD (Attending)  [phone] (Work)  [phone] (Fax)  [address] [place] [address],[address] [place] [address]  Rheumatology  Specialist Progress Note Available: Yes  Last Specialist Visit: 2022    Allergies   Allergen Reactions    Remicade [Infliximab] Swelling     Face and tongue swelling    Doxycycline Diarrhea    Reglan [Metoclopramide] Other (See Comments)     hallucinations       Past Medical History:   Diagnosis Date    Abnormal Pap smear of cervix     Anemia     Anxiety     Crohn's colitis ([facility] [address]     Depression     Herniated disc     Hx of blood clots     ? ?  PE during pregnancy    SAB (spontaneous )     x2      Social History     Tobacco Use    Smoking status: Never Smoker    Smokeless tobacco: Never Used   Substance Use Topics    Alcohol use: No     Family History   Problem Relation Age of Onset    Arthritis Father     High Blood Pressure Father     Crohn's Disease Father     Diabetes Paternal Aunt     Mental Retardation Paternal Aunt     Atrial Fibrillation Maternal Grandmother     High Blood Pressure Maternal Grandmother     Obesity Maternal Grandmother     Stroke Maternal Grandmother     Early Death Maternal Grandfather     Heart Disease Maternal Grandfather     High Blood Pressure Maternal Grandfather     Obesity Maternal Grandfather     Depression Paternal Grandmother     Heart Disease Paternal Grandmother     High Blood Pressure Paternal Grandmother     Kidney Disease Paternal Grandmother     Obesity Paternal Grandmother     Heart Disease Paternal Grandfather     High Blood Pressure Paternal Grandfather     No Known Problems Mother        INTERM HISTORY  Have you been diagnosed with any additional conditions since we last talked? no  Have you developed any new allergies since we last talked? no  Have you stopped taking any medications or supplements since we last talked? no  Have you started taking any additional medications or supplements since we last talked? no    REVIEW OF CURRENT DISEASE STATE  Inflammatory Bowel Disease: Patient with diagnosis of Crohn's Disease. The patient has had no surgery for treatment of their IBD. The patient is currently having 3-4 bowel movements 1 weeks after injection then 4 bowel movements per day 1 weeks after injection. Initially stools are more soft/formed then progress to more watery, however have have significantly improved with cimzia. The patient currently denies significant abdominal pain or discomfort. The patient does not have fever. The patient does not have weight loss. The patient does not have extraintestinal symptoms. Rheumatoid arthritis: Patient states much improvement since starting cimzia. Symptoms include the stiffness in the hands and feet. Patient reports past symptoms hands would freeze and become stiff and feet would become stiff and would cause a \"shuffle walk. \" Patient reports no longer has those symptoms. · Are you currently having a flare? no  · Considering all the ways in which this condition and others affects you, how are you doing (0 = very well, 10 = very poorly)?  1  · How would you rate your pain on average? (0 = no pain, 10 = worst pain imaginable) 2  · During the past 4 weeks, have you missed any planned activities of daily living due to condition (work/school/other plans)? No  · During the past 4 weeks, have you had to seek urgent care, ER admission, Unplanned doctor office visit, or hospital admission? No    MEDICATIONS  Current Outpatient Medications   Medication Sig Dispense Refill    certolizumab pegol (CIMZIA PREFILLED) 2 X 200 MG/ML KIT injection Inject 400 mg into the skin every 28 days 1 kit 3    Cholecalciferol (VITAMIN D3) 50 MCG (2000 UT) CAPS Take 2,000 Units by mouth daily      vedolizumab (ENTYVIO) 300 MG injection Infuse 300 mg at Week 6 and every 8 weeks thereafter (Patient not taking: Reported on 2/2/2022) 1 each 6    ibuprofen (ADVIL;MOTRIN) 200 MG tablet Take 200 mg by mouth every 6 hours as needed for Pain (Patient not taking: Reported on 2/2/2022)      desvenlafaxine succinate (PRISTIQ) 50 MG TB24 extended release tablet Take 50 mg by mouth daily      Prenatal Vit-DSS-Fe Cbn-FA (PRENATAL ADVANTAGE PO) Take 1 tablet by mouth daily (Patient not taking: Reported on 3/16/2022)      ferrous sulfate (IRON 325) 325 (65 Fe) MG tablet Take 325 mg by mouth 3 times daily (with meals)       Multiple Vitamin (STRESS B PO) Take 1 tablet by mouth daily      L-Methylfolate-Algae (DEPLIN 15 PO) Take 1 each by mouth daily (Patient not taking: Reported on 2/2/2022)      loratadine (CLARITIN) 10 MG capsule Take 10 mg by mouth daily       escitalopram (LEXAPRO) 10 MG tablet Take 10 mg by mouth daily       ALPRAZolam (XANAX) 0.5 MG tablet Take 0.5 mg by mouth 3 times daily as needed for Sleep or Anxiety. No current facility-administered medications for this visit. Current Specialty Medication:   Certolizumab (Cimzia)   Indication Specific dosing:   Crohn Disease: 400 mg on weeks 0, 2, and 4, then 400 mg every 4 weeks. Warnings/Precautions: Hypersensitivity;  Autoantibody formation + antibodies to certolizumab; optic neuritis, seizure, peripheral neuropathy, and demyelinating disease (new onset or exacerbation); pancytopenia and other significant cytopenias; reactivation of hepatitis B virus (HBV); Infections: [US Boxed Warning]; Malignancy: [US Boxed Warning]; Tuberculosis: [US Boxed Warning]; worsening heart failure and new-onset heart failure (use with caution)   Recommended Monitoring: Annual TB and HBV screening; CBC and CMP periodically (to be completed within 7 days of initiation); assessment for sign/symptoms of heart failure; periodic skin examinations   Patient Reported Side Effects: heavy periods- OB and rheumatologist are informed   Specialty Medication Start Date: 06/2021  Appropriate Dose: Yes    Describe your medication adherence over the last 4 weeks: Excellent  How many doses have you missed in the last 4 weeks, if any? 0  How confident are you to follow the injection process and the treatment plan? (0-10) 10 Who injects? self  Does the patient have a current infection of any kind? No    Contraindications to therapy present? No    Drug Interactions:  No clinically significant interactions identified via mii Interaction Analysis as category D or higher.  _____________________________________________________________________  Renal Dosing:  Creatinine Clearance: CrCl cannot be calculated (Patient's most recent lab result is older than the maximum 180 days allowed. ). No renal adjustments necessary. IMMUNIZATIONS  Immunization History   Administered Date(s) Administered    COVID-19, Abhi Pacheco, Primary or Immunocompromised, PF, 100mcg/0.5mL 12/24/2020, 01/21/2021    Influenza Virus Vaccine 01/01/2011, 01/01/2012, 01/01/2013, 09/15/2015, 01/01/2017, 01/16/2017, 12/01/2017, 10/18/2018, 11/19/2019    Influenza Whole 01/01/2014    Meningococcal MCV4P (Menactra) 06/30/2005    Td, unspecified formulation 08/08/2015    Tdap (Boostrix, Adacel) 03/10/2017      Immunization status: up to date and documented.     LABS  Lab Results   Component Value Date    BUN 9 03/30/2021    CREATININE 0.6 03/30/2021     Lab Results   Component Value Date    WBC 6.6 03/16/2022    HGB 13.0 03/16/2022    HCT 38.6 03/16/2022    RBC 4.51 03/16/2022     03/16/2022     Lab Results   Component Value Date    ALKPHOS 73 03/30/2021    ALT 12 03/30/2021    AST 16 03/30/2021    BILITOT 0.3 03/30/2021    BILIDIR 0.5 08/08/2017    IBILI 0.6 08/08/2017       ASSESSMENTS  Fall Risk 9/23/2020   2 or more falls in past year? no   Fall with injury in past year? no     PROMIS V1.1 Global Health 9/23/2020   In general, would you say your health is: 3   In general, would you say your quality of life is: 3   In general, how would you rate your physical health? 2   In general, how would you rate your mental health, including your mood and your ability to think? 3   In general, how would you rate your satisfaction with your social activities and relationships? 4   In general, please rate how well you carry out your usual social activities and roles. (This includes activities at home, at work and in your community, and responsibilities as a parent, child, spouse, employee, friend, etc.) 4   To what extent are you able to carry out your everyday physical activities such as walking, climbing stairs, carrying groceries, or moving a chair? 4   In the past 7 days how often have you been bothered by emotional problems such as feeling anxious, depressed or irritable? 2   In the past 7 days how would you rate your fatigue on average? 3   In the past 7 days how would you rate your pain on average? 2   How comfortable are you filling out medical forms by yourself? 4   What amount of pain have you experienced in the last week in your other knee/hip? 0   My BACK PAIN at the moment is 0   Mode of Collection 2   Person Completing Survey 0   PROMIS Physical Score 11   PROMIS Mental Score 12       1.  Crohn's Disease   Sailaja Jaquez is a 37 y.o. female being treated for Crohn's Disease and RA    Med rec completed, no DDI identified. Allergy and diagnosis info reviewed and updated. Pt has a history remarkable for the following conditions: Abnormal LFT, Anxiety/Depression, Obesity, Iron Deficiency, and Vitamin D deficiency.   Current therapy includes: Cimzia every month    Medication Effectiveness: pt believes its working better than the infusion, controlled symptoms    Patient had no questions regarding the medications warnings, precautions and contraindications. Confirmed appropriate storage and disposal.    Patient had no concerns with the administration process.   Current disease state symptoms include: increased frequency of bowel movements after 1 weeks after injection    No side effects/adverse events reported, and no adherence issues identified.   Patient is not considered high-risk. Based on patient feedback/results of the assessment, the therapy is still appropriate.   No medication-related problem(s) or patient need(s) identified that would require a care plan. Follow up in 180 days    2. Immunizations   Immunization status: up to date and documented. 3. Drug Interactions   none    4. Other Identified Potential Issues   Obesity- current BMI @ 49.65kg/m will continue to monitor      PLAN  Goals of therapy, common side effects, medication storage, and administration reviewed with patient. continue cimzia 400 mg every 28 days      Recommended lab monitoring:   - CBC with differential- last checked  03/16/2022  - tuberculosis (TB) screening prior to initiating and during therapy    Keep all scheduled appointments. Return to clinic in 6 month(s). or call with any questions or concerns. Isak Ozuna [facility], PharmD, [address]  Ambulatory Clinical Pharmacist   4/22/2022 3:53 PM    Discussed with patient the Pharmacist Collaborative Practice Agreement.   Patient provided verbal and/or electronic (ex. [employer]art) consent to participate in the collaborative practice agreement between the pharmacist and referred patient. This is in lieu of paper consent due to COVID-19 precautions and the use of remote/virtual visits. For [place] in place:   Yes   Recommendation Provided To: Patient/Caregiver: 1 via Virtual Visit   Intervention Detail: Adherence Monitorin and Lab(s) Ordered   Intervention Accepted By: Patient/Caregiver: 0   Time Spent (min): 60

## 2022-06-23 NOTE — TELEPHONE ENCOUNTER
Ankush Fraser is a 30 y.o. female enrolled in the Specialty Medication Service. We received a refill request for Cimzia on 6/23/2022. Patient does have a signed CPA on file dated 9/23/2020    Patient last met with [facility] on  4/22/22 and is due for follow up on 9/30/22. Follow up visit is not scheduled at this time. Patient's last visit with specialist (Dennys Leon) was on 1/13/22  and patient is due for follow up visit on 7/2022 per office visit documentation. Follow up visit is not  scheduled at this time. Original Rx was written for 1+ 5 fills on 6/2322.        Thank you,  Mag Founds

## 2022-08-13 ENCOUNTER — NURSE TRIAGE (OUTPATIENT)
Dept: OTHER | Facility: CLINIC | Age: 37
End: 2022-08-13

## 2022-08-13 NOTE — TELEPHONE ENCOUNTER
Reason for Disposition   [1] Adult with possible COVID-19 symptoms AND [2] triager concerned about severity of symptoms or other causes   [1] Fever > 100.0 F (37.8 C) AND [2] diabetes mellitus or weak immune system (e.g., HIV positive, cancer chemo, splenectomy, organ transplant, chronic steroids)    Protocols used: Coronavirus (COVID-19) Diagnosed or Suspected-ADULT-AH, Cough - Acute Non-Productive-ADULT-AH      Takes a biologic. Rheumatoid Arthritis    Not getting anything up with coughing. Non productive cough. Immunocompromised and wants to stay away from [facility]. .    Subjective: Caller states \"Feeling unwell. \"     Current Symptoms: Cough vomiting fever. Decreased smell and taste. Fuzzy headed. Onset: 1 day ago; gradual    Associated Symptoms: reduced activity    Pain Severity: NA   Denies chest pain, pressure    Temperature: 101.0     What has been tried:NA    LMP: NA Pregnant: Unknown    Recommended disposition: Go to ED/[facility] Now (Or to Office with PCP Approval)    Care advice provided, patient verbalizes understanding; denies any other questions or concerns; instructed to call back for any new or worsening symptoms. Patient/caller agrees to follow-up with PCP     Caller will page her on call provider for a secondary triage. She needs to be tested for COVID and further evaluation of her symptoms. If warranted, she may need Paxlovid. Attention Provider: Thank you for allowing me to participate in the care of your patient. The patient was connected to triage in response to symptoms provided. Please do not respond through this encounter as the response is not directed to a shared pool.

## 2022-09-26 ENCOUNTER — TELEPHONE (OUTPATIENT)
Dept: PHARMACY | Age: 37
End: 2022-09-26

## 2022-09-26 NOTE — TELEPHONE ENCOUNTER
Specialty Medication Service    Date: 9/26/2022  Patient's Name: Trudi Menon YOB: 1985            _____________________________________________________________________________________________    Antoinette Math with patient  to schedule PharmD follow up appointment for Specialty Medication Services. Appointment scheduled for 9/29/2022.      Elsi Mcgregor PharmD  Ambulatory Clinical Pharmacist   Specialty Medication Services   Phone: [phone]  9/26/2022 1:15 PM    For Pharmacy Admin Tracking Only    CPA in place:  Yes  Recommendation Provided To: Patient/Caregiver: 1 via Telephone  Intervention Detail: Scheduled Appointment  Gap Closed?: Yes   Intervention Accepted By: Patient/Caregiver: 1  Time Spent (min): 10

## 2022-09-28 ENCOUNTER — TELEPHONE (OUTPATIENT)
Dept: OBGYN CLINIC | Age: 37
End: 2022-09-28

## 2022-09-28 DIAGNOSIS — Z32.01 POSITIVE URINE PREGNANCY TEST: Primary | ICD-10-CM

## 2022-09-28 NOTE — TELEPHONE ENCOUNTER
Pt calling because she got an unexpected positive pregnancy test x2. She says she has had 2 tubals, 2 losses and her live birth was high risk. She is asking if she needs to be seen sooner then 7 weeks due to her history. Please advise.

## 2022-09-29 ENCOUNTER — PHARMACY VISIT (OUTPATIENT)
Dept: PHARMACY | Age: 37
End: 2022-09-29

## 2022-09-29 ENCOUNTER — HOSPITAL ENCOUNTER (OUTPATIENT)
Age: 37
Discharge: HOME | End: 2022-09-29
Payer: COMMERCIAL

## 2022-09-29 DIAGNOSIS — K50.019 CROHN'S DISEASE OF SMALL INTESTINE WITH COMPLICATION (HCC): Primary | ICD-10-CM

## 2022-09-29 DIAGNOSIS — M06.00 SERONEGATIVE RHEUMATOID ARTHRITIS (HCC): ICD-10-CM

## 2022-09-29 DIAGNOSIS — Z32.01 POSITIVE URINE PREGNANCY TEST: ICD-10-CM

## 2022-09-29 PROCEDURE — 84702 CHORIONIC GONADOTROPIN TEST: CPT

## 2022-09-29 PROCEDURE — 36415 COLL VENOUS BLD VENIPUNCTURE: CPT

## 2022-09-29 RX ORDER — PANTOPRAZOLE SODIUM 40 MG/1
40 TABLET, DELAYED RELEASE ORAL DAILY
COMMUNITY
End: 2022-09-29

## 2022-09-29 RX ORDER — SULFAMETHOXAZOLE AND TRIMETHOPRIM 800; 160 MG/1; MG/1
TABLET ORAL
COMMUNITY
Start: 2022-09-24

## 2022-09-29 RX ORDER — CERTOLIZUMAB PEGOL 200 MG/ML
INJECTION, SOLUTION SUBCUTANEOUS
COMMUNITY
Start: 2022-09-21 | End: 2022-09-29

## 2022-09-29 RX ORDER — FLUCONAZOLE 150 MG/1
TABLET ORAL
COMMUNITY
Start: 2022-09-24 | End: 2022-09-29

## 2022-09-29 RX ORDER — CELECOXIB 200 MG/1
CAPSULE ORAL
COMMUNITY
Start: 2021-10-18 | End: 2022-09-29

## 2022-09-29 ASSESSMENT — PROMIS GLOBAL HEALTH SCALE
SUM OF RESPONSES TO QUESTIONS 3, 6, 7, & 8: 12
IN GENERAL, HOW WOULD YOU RATE YOUR SATISFACTION WITH YOUR SOCIAL ACTIVITIES AND RELATIONSHIPS [ON A SCALE OF 1 (POOR) TO 5 (EXCELLENT)]?: 3
SUM OF RESPONSES TO QUESTIONS 2, 4, 5, & 10: 12
TO WHAT EXTENT ARE YOU ABLE TO CARRY OUT YOUR EVERYDAY PHYSICAL ACTIVITIES SUCH AS WALKING, CLIMBING STAIRS, CARRYING GROCERIES, OR MOVING A CHAIR [ON A SCALE OF 1 (NOT AT ALL) TO 5 (COMPLETELY)]?: 4
IN GENERAL, HOW WOULD YOU RATE YOUR PHYSICAL HEALTH [ON A SCALE OF 1 (POOR) TO 5 (EXCELLENT)]?: 3
IN GENERAL, PLEASE RATE HOW WELL YOU CARRY OUT YOUR USUAL SOCIAL ACTIVITIES (INCLUDES ACTIVITIES AT HOME, AT WORK, AND IN YOUR COMMUNITY, AND RESPONSIBILITIES AS A PARENT, CHILD, SPOUSE, EMPLOYEE, FRIEND, ETC) [ON A SCALE OF 1 (POOR) TO 5 (EXCELLENT)]?: 4
IN THE PAST 7 DAYS, HOW WOULD YOU RATE YOUR FATIGUE ON AVERAGE [ON A SCALE FROM 1 (NONE) TO 5 (VERY SEVERE)]?: 3
IN GENERAL, WOULD YOU SAY YOUR HEALTH IS...[ON A SCALE OF 1 (POOR) TO 5 (EXCELLENT)]: 3
IN THE PAST 7 DAYS, HOW WOULD YOU RATE YOUR PAIN ON AVERAGE [ON A SCALE FROM 0 (NO PAIN) TO 10 (WORST IMAGINABLE PAIN)]?: 2
IN THE PAST 7 DAYS, HOW OFTEN HAVE YOU BEEN BOTHERED BY EMOTIONAL PROBLEMS, SUCH AS FEELING ANXIOUS, DEPRESSED, OR IRRITABLE [ON A SCALE FROM 1 (NEVER) TO 5 (ALWAYS)]?: 3
IN GENERAL, WOULD YOU SAY YOUR QUALITY OF LIFE IS...[ON A SCALE OF 1 (POOR) TO 5 (EXCELLENT)]: 3
IN GENERAL, HOW WOULD YOU RATE YOUR MENTAL HEALTH, INCLUDING YOUR MOOD AND YOUR ABILITY TO THINK [ON A SCALE OF 1 (POOR) TO 5 (EXCELLENT)]?: 3

## 2022-09-29 ASSESSMENT — ROUTINE ASSESSMENT OF PATIENT INDEX DATA (RAPID3)
ON A SCALE OF ONE TO TEN, HOW DIFFICULT WAS IT FOR YOU TO COMPLETE THE LISTED DAILY PHYSICAL TASKS OVER THE LAST WEEK: 2
ON A SCALE OF ONE TO TEN, CONSIDERING ALL THE WAYS IN WHICH ILLNESS AND HEALTH CONDITIONS MAY AFFECT YOU AT THIS TIME, PLEASE INDICATE BELOW HOW YOU ARE DOING:: 4
ON A SCALE OF ONE TO TEN, HOW MUCH PAIN HAVE YOU HAD BECAUSE OF YOUR CONDITION OVER THE PAST WEEK?: 2
TOTAL RAPID3 SCORE: 6
TOTAL RAPID3 SCORE: 6

## 2022-09-29 NOTE — PROGRESS NOTES
Specialty Medication Service    Patient's Name: Grey Tomas YOB: 1985            Reason for visit: Grey Tomas is a 30 y.o. female presenting today for Specialty Medication Service visit follow up. Patient last seen by [facility] 2022. Patient continues on [facility] formulary medication, Cimzia. Pharmacy completed Specialty Medication Service visit for medication monitoring and counseling. Medication list updated. Specialty Medication: Cimzia 200mg/mL    Frequency: 400mg every 28 days  Indication: Crohn's Disease + Seronegative Rheumatoid Arthritis   Initially Diagnosed: Crohn's , RA   Additional Therapy:   None   Previous Therapy:   Remicade - allergic reaction   Entyvio - ineffective   Mesalamine  - stopped working   Budesonide - stopped working     Specialist:   Philip Lua MD  [facility] [address] [place] [address]  P: [phone]  F: [phone]  Specialist Progress Note Available: Yes, in [facility]  Last Specialist Visit: 2022  Overall doing much better on the Cimzia - has helped with RA and Crohn's. Continue Cimzia and if continues to do well can try stopping celebrex. Annual TB screening and CRP due. Allergies   Allergen Reactions    Remicade [Infliximab] Swelling     Face and tongue swelling    Doxycycline Diarrhea    Reglan [Metoclopramide] Other (See Comments)     hallucinations       Past Medical History:   Diagnosis Date    Abnormal Pap smear of cervix     Anemia     Anxiety     Crohn's colitis ([facility] [address]     Depression     Herniated disc     Hx of blood clots     ? ?  PE during pregnancy    SAB (spontaneous )     x2      Social History     Tobacco Use    Smoking status: Never    Smokeless tobacco: Never   Substance Use Topics    Alcohol use: No     Family History   Problem Relation Age of Onset    Arthritis Father     High Blood Pressure Father     Crohn's Disease Father     Diabetes Paternal Aunt Mental Retardation Paternal Aunt     Atrial Fibrillation Maternal Grandmother     High Blood Pressure Maternal Grandmother     Obesity Maternal Grandmother     Stroke Maternal Grandmother     Early Death Maternal Grandfather     Heart Disease Maternal Grandfather     High Blood Pressure Maternal Grandfather     Obesity Maternal Grandfather     Depression Paternal Grandmother     Heart Disease Paternal Grandmother     High Blood Pressure Paternal Grandmother     Kidney Disease Paternal Grandmother     Obesity Paternal Grandmother     Heart Disease Paternal Grandfather     High Blood Pressure Paternal Grandfather     No Known Problems Mother        INTERM HISTORY  Have you been diagnosed with any additional conditions since we last talked? yes, patient has cellulitis and just found out she is pregnant   Have you developed any new allergies since we last talked? no  Have you stopped taking any medications or supplements since we last talked? yes, celebrex   Have you started taking any additional medications or supplements since we last talked? yes, Bactrim for cellulitis     REVIEW OF CURRENT DISEASE STATE  Rheumatoid Arthritis: Patient with diagnosis of rheumatoid arthritis. She reports no recent flares. Currently only has stiffness in hands in feet in the AM, lasting for about 1 hour, and in the evening. Symptoms are made worse by: cold exposure and overuse. Symptoms are helped by OTC pain medications (ibuprofen) and heat- soaking in hot water or using hot washcloths. Overall disease activity:  improved. Limitation on activities include none. Inflammatory Bowel Disease: Patient with diagnosis of Crohn's Disease. Patient reports that Cimzia has been helpful but typically good control only last for the first 2 weeks after injection. For the first 2 weeks patient has 1-2 bowel movements/day that are either formed or loose.  Week 2 and 3 she has about 5 bowel movements/day that are loose and typically small volume. Patient denies blood in stool. The patient currently denies significant abdominal pain or discomfort. She does report occasional abdominal pain that typically occurs the 2 weeks prior to her next dose and is manageable. Disease has involvement includes the ileum. The patient has had no surgery for treatment of their IBD. The patient has not had any fevers. The patient recently lost ~10 pounds but attributes this to eating less due to her being busy and not due to her Crohn's disease. The patient has not had any extraintestinal symptoms. · Are you currently having a flare? no  · Considering all the ways in which this condition and others affects you, how are you doing (0 = very well, 10 = very poorly)? 4  · How would you rate your pain on average? (0 = no pain, 10 = worst pain imaginable)  2   · During the past 4 weeks, have you missed any planned activities of daily living due to condition (work/school/other plans)? No  · During the past 4 weeks, have you had to seek urgent care, ER admission, Unplanned doctor office visit, or hospital admission?  Yes- went to urgent care due to cellulitis of breast (currently taking Bactrim)     MEDICATIONS  Current Outpatient Medications   Medication Sig Dispense Refill    celecoxib (CELEBREX) 200 MG capsule TAKE 1 CAPSULE BY MOUTH 2 TIMES A DAY      mupirocin (BACTROBAN) 2 % ointment Apply twice daily      Prenatal Vit-Fe Fumarate-FA (PRENATAL FORMULA 3 PO) Take 1 tablet by mouth daily      fluconazole (DIFLUCAN) 150 MG tablet TAKE 1 TABLET BY MOUTH DAILY FOR 1 DAY      nirmatrelvir/ritonavir (PAXLOVID) 20 x 150 MG & 10 x 100MG TBPK TAKE 3 TABLETS BY MOUTH TWICE DAILY FOR 5 DAYS      pantoprazole (PROTONIX) 40 MG tablet Take 40 mg by mouth daily      sulfamethoxazole-trimethoprim (BACTRIM DS;SEPTRA DS) 800-160 MG per tablet TAKE 1 TABLET BY MOUTH EVERY 12 HOURS FOR 10 DAYS      CIMZIA PREFILLED 2 X 200 MG/ML PSKT injection       certolizumab pegol (CIMZIA) 2 X 200 MG KIT injection Inject 400 mg into the skin every 28 days 1 each 5    certolizumab pegol (CIMZIA PREFILLED) 2 X 200 MG/ML KIT injection Inject 400 mg into the skin every 28 days 1 kit 3    Cholecalciferol (VITAMIN D3) 50 MCG (2000 UT) CAPS Take 2,000 Units by mouth daily      vedolizumab (ENTYVIO) 300 MG injection Infuse 300 mg at Week 6 and every 8 weeks thereafter (Patient not taking: Reported on 2/2/2022) 1 each 6    ibuprofen (ADVIL;MOTRIN) 200 MG tablet Take 200 mg by mouth every 6 hours as needed for Pain (Patient not taking: Reported on 2/2/2022)      desvenlafaxine succinate (PRISTIQ) 50 MG TB24 extended release tablet Take 50 mg by mouth daily      Prenatal Vit-DSS-Fe Cbn-FA (PRENATAL ADVANTAGE PO) Take 1 tablet by mouth daily (Patient not taking: Reported on 3/16/2022)      ferrous sulfate (IRON 325) 325 (65 Fe) MG tablet Take 325 mg by mouth 3 times daily (with meals)       Multiple Vitamin (STRESS B PO) Take 1 tablet by mouth daily      L-Methylfolate-Algae (DEPLIN 15 PO) Take 1 each by mouth daily (Patient not taking: Reported on 2/2/2022)      loratadine (CLARITIN) 10 MG capsule Take 10 mg by mouth daily       escitalopram (LEXAPRO) 10 MG tablet Take 10 mg by mouth daily       ALPRAZolam (XANAX) 0.5 MG tablet Take 0.5 mg by mouth 3 times daily as needed for Sleep or Anxiety. No current facility-administered medications for this visit. Current Specialty Medication:   Certolizumab (Cimzia)  Indication Specific Recommended Dose:   Rheumatoid Arthritis: 400 mg on weeks 0, 2, and 4, then 200 mg every 2 weeks; may consider 400 mg every 4 weeks. Contraindications: hypersensitivity to certolizumab  Warnings/Precautions: Hypersensitivity;  Autoantibody formation + antibodies to certolizumab; optic neuritis, seizure, peripheral neuropathy, and demyelinating disease (new onset or exacerbation); pancytopenia and other significant cytopenias; reactivation of hepatitis B virus (HBV); Infections: [US Boxed Warning]; Malignancy: [US Boxed Warning]; Tuberculosis; worsening heart failure and new-onset heart failure (use with caution)   Recommended Monitoring: Annual TB; HBV screening, CBC, and CMP (baseline)  Storage: Store intact vials and syringes at 2°C to 8°C (36°F to 46°F); do not freeze. Do not separate contents of carton prior to use. Protect from light. Bring to room temperature prior to administration. Prefilled syringes may be stored at ?25°C (?77°F) for ?7 days. Store in original carton (to protect from light) and do not return to refrigerator   Handling: Bring to room temperature prior to administration. Administer subcutaneously into the thigh or abdomen (avoiding areas within 2 inches of navel). For a 400 mg (2 syringes) dose, administer each 200 mg syringe at a separate site; rotate injection sites (at least 1 inch from the previous site). Do not administer to areas where skin is tender, bruised, red, hard, or has scars or stretch marks  Patient Reported Side Effects: heavy menstrual bleeding   Specialty Medication Start Date: 6/2021  Appropriate Dose: Yes  Last tuberculin Test: 5/20/2021 - does labs with rheumatologist, will follow up with him   - Result: negative    Describe your medication adherence over the last 4 weeks: Excellent  How many doses have you missed in the last 4 weeks, if any? 0  How confident are you to follow the injection process and the treatment plan? (0-10) 10 Who injects? Self   Does the patient have a current infection of any kind? No    Currently pregnant? Yes - one month since last period and positive pregnancy test 9/28. Patient to follow-up with rheumatologist.     Contraindications to therapy present?  No    Drug Interactions:  No clinically significant interactions identified via CUVISM [employer]INE Interaction Analysis as category D or higher.  _____________________________________________________________________  Renal Dosing:  Creatinine Clearance: CrCl cannot would you rate your satisfaction with your social activities and relationships? 4   In general, please rate how well you carry out your usual social activities and roles. (This includes activities at home, at work and in your community, and responsibilities as a parent, child, spouse, employee, friend, etc.) 4   To what extent are you able to carry out your everyday physical activities such as walking, climbing stairs, carrying groceries, or moving a chair? 4   In the past 7 days how often have you been bothered by emotional problems such as feeling anxious, depressed or irritable? 2   In the past 7 days how would you rate your fatigue on average? 3   In the past 7 days how would you rate your pain on average? 2   How comfortable are you filling out medical forms by yourself? 4   What amount of pain have you experienced in the last week in your other knee/hip? 0   My BACK PAIN at the moment is 0   Mode of Collection 2   Person Completing Survey 0   PROMIS Physical Score 11   PROMIS Mental Score 12       Rheumatoid Arthritis & Chron's Disease  Martha Nelson is a 30 y.o. female being treated for Rheumatoid Arthritis & Chron's Disease   Medication Reconciliation completed (reviewed medication list with patient), no drug-drug interactions identified. Allergy and diagnosis info reviewed and updated. Martha Nelson has a history remarkable for the following conditions: anemia, anxiety, Crohn's disease, depression, and rheumatoid arthritis. Current therapy includes: Cimzia 400mg every 28 days   Medication Effectiveness: Patient disease is well controlled on current therapy. Patient had no questions regarding the medication's warnings, precautions, and contraindications. Confirmed appropriate storage and disposal.  Patient had no concerns with the administration process. Current disease state symptoms include: Hand stiffness in the morning lasting ~1 hour and in the evening.  RAPID 3 score =  Increased frequency of bowel movements during the two weeks prior to next injection. Patient reports heavy menstrual periods since starting Cimzia; and no adherence issues identified. Functional and cognitive limitations include: None  Patient is considered high risk (pregnant). Based on patient feedback/results of the assessment, the therapy is  still appropriate. No medication-related problem(s) or patient need(s) identified that would require a care plan. Follow up in 180 days     Immunizations  Immunization status: missing doses of Prevnar-20. Patient verbalized understanding of recommendation. Discussed patient can receive vaccination at any pharmacy besides [facility] or [facility]. Drug Interactions  No clinically significant interactions identified via Herrenschmiede Interaction Analysis as category D or higher. Other Identified Potential Issues  Discussed with patient the Pharmacist Collaborative Practice Agreement. Patient provided verbal and/or electronic (ex. [employer]) consent to participate in the collaborative practice agreement between the pharmacist and referred patient. This is in lieu of paper consent due to COVID-19 precautions and the use of remote/virtual visits. PLAN  Goals of therapy, common side effects, medication storage, and administration reviewed with patient. continue Cimzia 400mg every 28 days    Recommended lab monitoring: TB test, patient to follow up with rheumatologist for labs   Recommended vaccinations: [id number] + flu vaccine   Keep all scheduled appointments.      Bacilio Morgan PharmD  Ambulatory Clinical Pharmacist   Specialty Medication Services   Phone: [phone]  2022 9:42 AM    For Pharmacy Admin Tracking Only    CPA in place:  No  Recommendation Provided To: Patient/Caregiver: 1 via Telephone  Intervention Detail: Adherence Monitorin and Vaccine Recommended/Administered  Gap Closed?: Yes   Intervention Accepted By: Patient/Caregiver: 1  Time Spent (min): 65 Angelica Morton was evaluated through a synchronous (real-time) audio encounter. Patient identification was verified at the start of the visit. She (or guardian if applicable) is aware that this is a billable service, which includes applicable co-pays. This visit was conducted with the patient's (and/or legal guardian's) verbal consent. She has not had a related appointment within my department in the past 7 days or scheduled within the next 24 hours. The patient was located at Home: [place] [address]. The provider was located at [facility] (Appt Dept): [facility] [address] [place] [address],[address]  [place],  [facility].

## 2022-09-29 NOTE — TELEPHONE ENCOUNTER
Pt is aware of Physician response and orders to be done. She will have completed at [facility].  DONE

## 2022-09-29 NOTE — TELEPHONE ENCOUNTER
Pt returned call to office and is aware of Physician response. She is scheduled for her Amenorrhea at 6 weeks. She is asking if we can check and trend her HCG. She says she is anxious due to history and wants to make sure that numbers are ok.

## 2022-09-30 LAB — GONADOTROPIN, CHORIONIC (HCG) QUANT: 553.5 MIU/ML

## 2022-10-01 ENCOUNTER — HOSPITAL ENCOUNTER (OUTPATIENT)
Age: 37
Discharge: HOME | End: 2022-10-01
Payer: COMMERCIAL

## 2022-10-01 DIAGNOSIS — Z32.01 POSITIVE URINE PREGNANCY TEST: ICD-10-CM

## 2022-10-01 PROCEDURE — 84702 CHORIONIC GONADOTROPIN TEST: CPT

## 2022-10-01 PROCEDURE — 36415 COLL VENOUS BLD VENIPUNCTURE: CPT

## 2022-10-02 LAB — GONADOTROPIN, CHORIONIC (HCG) QUANT: 930.8 MIU/ML

## 2022-10-04 ENCOUNTER — OFFICE VISIT (OUTPATIENT)
Dept: OBGYN CLINIC | Age: 37
End: 2022-10-04
Payer: COMMERCIAL

## 2022-10-04 VITALS
DIASTOLIC BLOOD PRESSURE: 84 MMHG | HEIGHT: 67 IN | WEIGHT: 293 LBS | TEMPERATURE: 98 F | BODY MASS INDEX: 45.99 KG/M2 | HEART RATE: 93 BPM | SYSTOLIC BLOOD PRESSURE: 110 MMHG

## 2022-10-04 DIAGNOSIS — Z87.59 HISTORY OF ECTOPIC PREGNANCY: ICD-10-CM

## 2022-10-04 DIAGNOSIS — F41.9 ANXIETY DISORDER, UNSPECIFIED TYPE: ICD-10-CM

## 2022-10-04 DIAGNOSIS — Z32.01 POSITIVE PREGNANCY TEST: Primary | ICD-10-CM

## 2022-10-04 DIAGNOSIS — O36.80X0 PREGNANCY OF UNKNOWN ANATOMIC LOCATION: ICD-10-CM

## 2022-10-04 DIAGNOSIS — Z98.891 HISTORY OF CESAREAN DELIVERY: ICD-10-CM

## 2022-10-04 DIAGNOSIS — Z87.59 HISTORY OF MISCARRIAGE: ICD-10-CM

## 2022-10-04 DIAGNOSIS — Z12.4 PAP SMEAR FOR CERVICAL CANCER SCREENING: ICD-10-CM

## 2022-10-04 DIAGNOSIS — Z86.711 HISTORY OF PULMONARY EMBOLUS (PE): ICD-10-CM

## 2022-10-04 DIAGNOSIS — Z01.419 ENCNTR FOR GYN EXAM (GENERAL) (ROUTINE) W/O ABN FINDINGS: Primary | ICD-10-CM

## 2022-10-04 DIAGNOSIS — K50.119 CROHN'S DISEASE OF COLON WITH COMPLICATION (HCC): ICD-10-CM

## 2022-10-04 DIAGNOSIS — Z32.01 POSITIVE URINE PREGNANCY TEST: ICD-10-CM

## 2022-10-04 DIAGNOSIS — Z86.32 HISTORY OF GESTATIONAL DIABETES: ICD-10-CM

## 2022-10-04 LAB
CRL: NORMAL
SAC DIAMETER: NORMAL

## 2022-10-04 PROCEDURE — 36415 COLL VENOUS BLD VENIPUNCTURE: CPT | Performed by: OBSTETRICS & GYNECOLOGY

## 2022-10-04 PROCEDURE — 81025 URINE PREGNANCY TEST: CPT | Performed by: OBSTETRICS & GYNECOLOGY

## 2022-10-04 PROCEDURE — 76801 OB US < 14 WKS SINGLE FETUS: CPT | Performed by: OBSTETRICS & GYNECOLOGY

## 2022-10-04 PROCEDURE — 99395 PREV VISIT EST AGE 18-39: CPT | Performed by: OBSTETRICS & GYNECOLOGY

## 2022-10-04 RX ORDER — PYRIDOXINE HCL (VITAMIN B6) 25 MG
25 TABLET ORAL 3 TIMES DAILY
Qty: 90 TABLET | Refills: 3 | Status: SHIPPED | OUTPATIENT
Start: 2022-10-04 | End: 2023-10-04

## 2022-10-04 NOTE — PROGRESS NOTES
Annual Exam      CC:   Chief Complaint   Patient presents with    Amenorrhea       HPI:  30 y.o. [id number] presents for her gynecologic annual exam.    Patient seen and examined. Patient is overall doing well today. Reports menses have been monthly, lasting for 5 days. Reports continue to be heavy and painful. LMP was 8/26/2022. Reports she has not had pain or bleeding since that time. States she has been dealing with increased heartburn and daily nausea. Reports a history of hyperemesis in a prior pregnancy. States recent breast cellulitis recently and was treated with Bactrim. Reports the infection has cleared, however she did not know she was pregnant at the time she took Bactrim. Has been having mild headaches, Tylenol is helping. Reports a history of miscarriage and ectopic pregnancy in the past.  Her only pregnancy to pass viability was supplemented in early pregnancy with Progesterone. Health Maintenance:  Birth control: None currently   Pregnancy plans: Currently with positive pregnancy test   Safe relationship:  - together 15 years,  11  Healthy diet: Diet for Crohns    Screening:  Last pap smear: April 2021 - NILM, neg HPV  History of abnormal pap smears: Yes     - 4/28/2021 - NILM, neg HPV     - 5/23/2018 - NILM, neg HPV     - 6/3/2017 - NILM, neg HPV     - 7/2016 - ASCUS, HPV neg     - 4/2015 - NILM     - 4/2014 - ASCUS, HPV neg     - 3/2013 - NILM    Vaccines:  Gardasil vaccine: Has had   Flu vaccine: Has had   COVID-19: Has had series and booster    Review of Systems:   Review of Systems   Constitutional:  Negative for chills and fever. HENT:  Negative for congestion and sore throat. Respiratory:  Negative for cough and shortness of breath. Cardiovascular:  Negative for chest pain and palpitations. Gastrointestinal:  Positive for nausea. Negative for abdominal pain, constipation, diarrhea and vomiting. Genitourinary:  Positive for menstrual problem. Negative for dysuria, frequency, pelvic pain and vaginal discharge. Musculoskeletal: Negative. Neurological:  Positive for headaches. All other systems reviewed and are negative. Breast: Reports recent skin changes and treatment for cellulitis. Denies nipple discharge, lesions, dimpling, tenderness or palpable masses     Primary Care Physician: Alex Lara MD    Obstetric History  OB History    Para Term  AB Living   5 1 1 0 4 1   SAB IAB Ectopic Molar Multiple Live Births   2 0 2   0 1      # Outcome Date GA Lbr Anil/2nd Weight Sex Delivery Anes PTL Lv   5 Term 17 39w5d   M CS-LTranv EPI N PARTHA      Complications: Failure to Progress in First Stage   4 Ectopic 14           3 SAB 14 8w0d          2 SAB 12 4w0d          1 Ectopic 08 7w0d             Obstetric Comments   Patient currently breast feeding mensis has not resumed     Obstetric history significant for  delivery for labor dystocia. Reports pregnancy complicated by GDMA1. Denies hx of GHTN. Hx includes ectopic x2 and SAB x2. Did not require D&C for SAB. Pregnancy was additionally complicated by pneumonia & PE at 8 weeks after being placed on bedrest for vaginal bleeding. Did not have CT PA and was only treated with baby ASA following. GynecologicHistory  Menstrual History:  LMP: Patient's last menstrual period was 2022 (exact date).    Age of Menarche: 12-13  Menstrual Period: regular  Interval Between Menses: Every 28-30 days  Duration of Menses: 5 days  Menstrual Flow: Changing pads every 3-4 hours  Bleeding between menses: Denies  Painful menses: Present after  delivery  Sexual History:  Contraception: see above  Currently is sexually active  14-20 Lifetime partners  Reports history of STIs - Chlamydia in  - had treatment following by negative testing   Denies sexual problems    Pap History:  History of abnormal pap smears: see above  Last pap: see above      Medical History:  Past Medical History:   Diagnosis Date    Abnormal Pap smear of cervix     Anemia     Anxiety     Crohn's colitis ([facility] [address]     Depression     Herniated disc     Hx of blood clots     ? ? PE during pregnancy    SAB (spontaneous )     x2       Medications:  Current Outpatient Medications   Medication Sig Dispense Refill    cephALEXin (KEFLEX) 250 MG capsule Take 1 capsule by mouth 4 times daily for 7 days 28 capsule 0    progesterone (ENDOMETRIN) 100 MG suppository Place 1 suppository vaginally nightly 30 suppository 0    Fluticasone Propionate (FLONASE NA) by Nasal route      pyridoxine (B-6) 25 MG tablet Take 1 tablet by mouth in the morning, at noon, and at bedtime 90 tablet 3    Prenatal Vit-Fe Fumarate-FA (PRENATAL FORMULA 3 PO) Take 1 tablet by mouth daily      mupirocin (BACTROBAN) 2 % ointment Apply twice daily      certolizumab pegol (CIMZIA PREFILLED) 2 X 200 MG/ML KIT injection Inject 400 mg into the skin every 28 days 1 kit 3    Cholecalciferol (VITAMIN D3) 50 MCG ( UT) CAPS Take 2,000 Units by mouth daily      desvenlafaxine succinate (PRISTIQ) 50 MG TB24 extended release tablet Take 50 mg by mouth daily      ferrous sulfate (IRON 325) 325 (65 Fe) MG tablet Take 325 mg by mouth 3 times daily (with meals)       loratadine (CLARITIN) 10 MG capsule Take 10 mg by mouth daily       escitalopram (LEXAPRO) 10 MG tablet Take 10 mg by mouth daily       ALPRAZolam (XANAX) 0.5 MG tablet Take 0.5 mg by mouth 3 times daily as needed for Sleep or Anxiety. sulfamethoxazole-trimethoprim (BACTRIM DS;SEPTRA DS) 800-160 MG per tablet TAKE 1 TABLET BY MOUTH EVERY 12 HOURS FOR 10 DAYS (Patient not taking: Reported on 10/4/2022)       No current facility-administered medications for this visit.        Surgical History:  Past Surgical History:   Procedure Laterality Date     SECTION      CHOLECYSTECTOMY, LAPAROSCOPIC N/A 2017    COLONOSCOPY N/A 2/3/2021    COLONOSCOPY WITH BIOPSY performed by Yodit Jin MD at [address]  2008    ERCP         Allergies: Allergies   Allergen Reactions    Remicade [Infliximab] Swelling     Face and tongue swelling    Trintellix [Vortioxetine] Other (See Comments)     Severe mood change     Doxycycline Diarrhea       Family History:  Family History   Problem Relation Age of Onset    Arthritis Father     High Blood Pressure Father     Crohn's Disease Father     Diabetes Paternal Aunt     Mental Retardation Paternal Aunt     Atrial Fibrillation Maternal Grandmother     High Blood Pressure Maternal Grandmother     Obesity Maternal Grandmother     Stroke Maternal Grandmother     Early Death Maternal Grandfather     Heart Disease Maternal Grandfather     High Blood Pressure Maternal Grandfather     Obesity Maternal Grandfather     Depression Paternal Grandmother     Heart Disease Paternal Grandmother     High Blood Pressure Paternal Grandmother     Kidney Disease Paternal Grandmother     Obesity Paternal Grandmother     Heart Disease Paternal Grandfather     High Blood Pressure Paternal Grandfather     No Known Problems Mother      Denies personal/family history of cervical, uterine, ovarian, vulvar, breast, or colon cancers. Denies personal/family history of bleeding or clotting disorders  Denies personal/family history of genetic disorders    Social History:  Social History     Socioeconomic History    Marital status:      Spouse name: None    Number of children: 0    Years of education: None    Highest education level: None   Occupational History    Occupation: RN   Tobacco Use    Smoking status: Never    Smokeless tobacco: Never   Vaping Use    Vaping Use: Never used   Substance and Sexual Activity    Alcohol use: No    Drug use: Yes     Types: Marijuana Shellye Burkitt)    Sexual activity: Yes     Partners: Male       Objective: Body mass index is 48.99 kg/m².   /84 (Site: Left Upper Arm, Position: Sitting, Cuff Size: Large Adult)   Pulse 93   Temp 98 °F (36.7 °C) (Infrared)   Ht 5' 7\" (1.702 m)   Wt (!) 312 lb 12.8 oz (141.9 kg)   LMP 08/26/2022 (Exact Date)   BMI 48.99 kg/m²     Exam:   Physical Exam  Vitals reviewed. Exam conducted with a chaperone present. Constitutional:       General: She is not in acute distress. Appearance: Normal appearance. HENT:      Head: Normocephalic and atraumatic. Eyes:      Conjunctiva/sclera: Conjunctivae normal.   Cardiovascular:      Rate and Rhythm: Normal rate. Pulmonary:      Effort: Pulmonary effort is normal. No respiratory distress. Chest:   Breasts:     Breasts are symmetrical.      Right: No mass, nipple discharge, skin change or tenderness. Left: No mass, nipple discharge, skin change or tenderness. Comments: Right breast with no erythema or abscess following treatment for cellulitis. Abdominal:      General: There is no distension. Palpations: Abdomen is soft. There is no mass. Tenderness: There is no abdominal tenderness. There is no guarding or rebound. Genitourinary:     General: Normal vulva. Exam position: Lithotomy position. Labia:         Right: No rash, tenderness or lesion. Left: No rash, tenderness or lesion. Urethra: No prolapse, urethral pain, urethral swelling or urethral lesion. Vagina: No signs of injury. No vaginal discharge, erythema, tenderness, bleeding or lesions. Cervix: No cervical motion tenderness, discharge, friability, lesion, erythema or cervical bleeding. Uterus: Enlarged. Not deviated, not fixed and not tender. Adnexa:         Right: No mass, tenderness or fullness. Left: No mass, tenderness or fullness. Rectum: Normal.      Comments: Exam limited secondary to patient body habitus  Musculoskeletal:         General: No swelling. Cervical back: Neck supple. No tenderness.    Skin:     General: Skin is warm and dry. Psychiatric:         Mood and Affect: Mood normal.         Behavior: Behavior normal.         Thought Content: Thought content normal.       Ultrasound:   OBSTETRIC ULTRASOUND--1ST TRIMESTER    DATE: 10/04/2022    PHYSICIAN: SAFIA Sharma.     SONOGRAPHER: Sujata De La Cruz RDMS    INDICATION: Amenorrhea    TYPE OF SCAN:  vaginal    FINDINGS:      The cul de sac is normal.  The cervix is normal.  The uterus is gravid. The uterus measures 8.38 cm x 4.91 cm x 3.86 cm. No uterine anomalies are evident. The right ovary is present. The right ovary measures 4.75 cm x 3.83 cm x 4.10 cm. Right corpus luteal cyst  with no torsion measuring 3.1x3.7x3.2cm     The left ovary is present. The left ovary measures 2.12 cm x 0.95 cm x 1.61 cm. There is a single intrauterine pregnancy identified. A fetal pole is noted with a gestational sac measuring . 69cm, consistent with gestational age of 5weeks and 3days and EDC of 06/03/2023. There is a 1 day discrepancy when compared with the gestational age of 5weeks and 4days and EDC of 06/02/2023 set by [facility]DLMP (08/26/2022). Yolk sac is absent. Fetal cardiac activity is absent. IMPRESSION:    Single IUP with  No cardiac activity, Yolk Sac or fetal pole, most likely due to early gestational age. MAB vs early gestational age. Imaging is limited secondary to early gestational age. Patient is well aware of the limitations of ultrasound in the detection of anomalies. Assessment/Plan:  30 y.o. [id number] presenting for her annual exam:    1. Encntr for gyn exam (general) (routine) w/o abn findings     - Pap smear collected today - will call with results     - Age based screening recommendations discussed     - Self breast exams/awareness discussed with the patient     - Healthy lifestyle habits discussed      - Will follow-up in 1 year for annual exam     2.  Pap smear for cervical cancer screening     - Pap smear collected today - will call with results     - Age based screening recommendations discussed    3. Pregnancy of unknown anatomic location     - Patient with positive pregnancy test and rising hCG levels by 40% (553 > 930)     - Hx of SAB x2 and ectopic pregnancy x2     - US with gestational sac measuring 5w3d (EGA 5w4d by LMP), however no yolk sac or fetal pole identified     - No free fluid or adnexal masses present, corpus luteum present on the right ovary     - Differential reviewed with patient including viable early IUP, miscarriage, ectopic pregnancy     - Will order additional labs including progesterone and type and screen as well as additional hCG      - Bleeding and pain precautions were reviewed with the patient regarding potential for viable vs non-viable pregnancy     - Return precautions were reviewed     - Will follow-up with labs and schedule repeat TVUS in 2 weeks as long as patient remains asymptomatic with appropriately rising hCG levels    4. Positive urine pregnancy test     - See above     - Exposure to Bactrim in early pregnancy - reviewed possible outcomes, has completed medication     - Will plan for MFM anatomy scan based on obesity as well as exposure     - Cultures obtained   - C.trachomatis N.gonorrhoeae DNA  - Culture, Urine  - POCT urine pregnancy  - HCG, QUANTITATIVE, PREGNANCY  - Progesterone  - TYPE AND SCREEN    5. History of miscarriage     - See above     6. History of ectopic pregnancy     - See above     7. BMI 45.0-49.9, adult [facility])     - Reviewed pregnancy associations with     8. History of pulmonary embolus (PE)     - Patient with reported history of PE - however was being treated for pneumonia at the time of symptoms. Patient declines CT PA and was only treated with baby ASA and not anticoagulated     - Reviewed likely shortness of breath at that time was due to Pneumonia and not PE - at this time will not treat with Lovenox based on this history     9. History of  delivery    10.  History of gestational diabetes    11. Anxiety     - Has been recommended to see Psychiatry by PCP      - Is prescribed Xanax, Lexapro, and Pristiq for symptoms     - Reviewed risks of medications in pregnancy     - Will avoid Xanax      - Scheduled with Abby Osuna to establish care and for Psychiatry contacts for management throughout pregnancy    12.  Crohn's      - Currently taking monthly injections (certolizumab)     - Will follow-up closely with GI for recommendations and alternatives, risk for congenital malformations equivalent with baseline population risk    Kurt Antonio DO

## 2022-10-05 ENCOUNTER — TELEPHONE (OUTPATIENT)
Dept: OBGYN CLINIC | Age: 37
End: 2022-10-05

## 2022-10-05 LAB
ABO/RH: NORMAL
ANTIBODY SCREEN: NORMAL
BACTERIA: ABNORMAL /HPF
BILIRUBIN URINE: NEGATIVE
BLOOD, URINE: NEGATIVE
CLARITY: CLEAR
COLOR: YELLOW
EPITHELIAL CELLS, UA: 9 /HPF (ref 0–5)
GLUCOSE URINE: NEGATIVE MG/DL
GONADOTROPIN, CHORIONIC (HCG) QUANT: 2144 MIU/ML
HPV COMMENT: NORMAL
HPV TYPE 16: NOT DETECTED
HPV TYPE 18: NOT DETECTED
HPVOH (OTHER TYPES): NOT DETECTED
HYALINE CASTS: 0 /LPF (ref 0–8)
KETONES, URINE: NEGATIVE MG/DL
LEUKOCYTE ESTERASE, URINE: ABNORMAL
MICROSCOPIC EXAMINATION: YES
NITRITE, URINE: NEGATIVE
PH UA: 6 (ref 5–8)
PROGESTERONE LEVEL: 5.95 NG/ML
PROTEIN UA: NEGATIVE MG/DL
RBC UA: 1 /HPF (ref 0–4)
SPECIFIC GRAVITY UA: 1.02 (ref 1–1.03)
URINE TYPE: ABNORMAL
UROBILINOGEN, URINE: 0.2 E.U./DL
WBC UA: 2 /HPF (ref 0–5)

## 2022-10-05 NOTE — TELEPHONE ENCOUNTER
Pt called stated she seen lab results for progesterone and wants to supplement. Pt stated with her child he was in the fives and she supplemented and he's here. Pt also asked are her HCG levels rising appropriately. Please advise.

## 2022-10-06 LAB
ORGANISM: ABNORMAL
URINE CULTURE, ROUTINE: ABNORMAL

## 2022-10-07 LAB
C TRACH DNA GENITAL QL NAA+PROBE: NEGATIVE
N. GONORRHOEAE DNA: NEGATIVE

## 2022-10-07 RX ORDER — CEPHALEXIN 250 MG/1
250 CAPSULE ORAL 4 TIMES DAILY
Qty: 28 CAPSULE | Refills: 0 | Status: SHIPPED | OUTPATIENT
Start: 2022-10-07 | End: 2022-10-14

## 2022-10-12 PROBLEM — K50.119 CROHN'S DISEASE OF COLON WITH COMPLICATION (HCC): Status: ACTIVE | Noted: 2022-10-12

## 2022-10-12 PROBLEM — O36.80X0 PREGNANCY OF UNKNOWN ANATOMIC LOCATION: Status: ACTIVE | Noted: 2022-10-12

## 2022-10-12 PROBLEM — Z98.891 HISTORY OF CESAREAN DELIVERY: Status: ACTIVE | Noted: 2022-10-12

## 2022-10-12 PROBLEM — Z87.59 HISTORY OF ECTOPIC PREGNANCY: Status: ACTIVE | Noted: 2022-10-12

## 2022-10-12 PROBLEM — Z87.59 HISTORY OF MISCARRIAGE: Status: ACTIVE | Noted: 2022-10-12

## 2022-10-12 PROBLEM — Z86.32 HISTORY OF GESTATIONAL DIABETES: Status: ACTIVE | Noted: 2022-10-12

## 2022-10-12 PROBLEM — Z86.711 HISTORY OF PULMONARY EMBOLUS (PE): Status: ACTIVE | Noted: 2022-10-12

## 2022-10-12 ASSESSMENT — ENCOUNTER SYMPTOMS
SHORTNESS OF BREATH: 0
VOMITING: 0
NAUSEA: 1
SORE THROAT: 0
ABDOMINAL PAIN: 0
CONSTIPATION: 0
COUGH: 0
DIARRHEA: 0

## 2022-10-13 ENCOUNTER — OFFICE VISIT (OUTPATIENT)
Dept: PSYCHOLOGY | Age: 37
End: 2022-10-13
Payer: COMMERCIAL

## 2022-10-13 ENCOUNTER — TELEPHONE (OUTPATIENT)
Dept: OBGYN CLINIC | Age: 37
End: 2022-10-13

## 2022-10-13 DIAGNOSIS — F41.9 ANXIETY: Primary | ICD-10-CM

## 2022-10-13 PROCEDURE — 90791 PSYCH DIAGNOSTIC EVALUATION: CPT | Performed by: SOCIAL WORKER
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
## 2022-10-13 NOTE — TELEPHONE ENCOUNTER
Agree with evaluation in ED as we do not have an intrauterine pregnancy established. Okay for Colace to decrease strain for bowel movements as well. Thank you.

## 2022-10-13 NOTE — TELEPHONE ENCOUNTER
Patient calling d/t she is still having spotting. She called and spoke to the on call provider 2 days ago. She noticed that it happens after she is straining for a bowel movement. She has Crohn's so this is not normal for her. She is not wearing a pad or panty liner as it is so light. Last vaginal intercourse was 10 days ago. Patient wanting to make sure this is normal, she has had ectopic's and miscarriages so she is on high alert. Pain and bleeding precautions given, she will report to the ED if needed. Routing to Dr. Mustapha Batres and provider on call Dr. Jonathon Lo.

## 2022-10-13 NOTE — PATIENT INSTRUCTIONS
[address] [phone] (horacio list)  [address] , [phone]  [facility]. [employer] SALIMA Wick, [employer] and [facility], medicare  Youtube \"the power of vulnerability\" and \"listening to shame\"-DORIE Talks  3-5 things you are grateful for and why-right before bed  Return to see Faisal Alford in 2 weeks. Progressive muscle relaxation (PMR) is an exercise that anyone can use to alleviate disturbing and disruptive emotional symptoms such as anxiety or insomnia. Like breathing exercises, visualization, and yoga, PMR is considered a relaxation technique. It's especially helpful in moments of high stress or nervousness, and even can help someone get through a panic attack. History of PMR  PMR was developed by an [place] physician, Harshad Coughlin, in the 1920s. Flynn Parents noted that regardless of their illness, the majority of his patients suffered from muscle pain and tension. When he suggested that they relax, he noticed that most people didn't seem connected enough to their physical tension to release it. This inspired Flynn Parents to develop a sequence of steps for tightening and then relaxing groups of muscles. He found this allowed his patients to become more aware of their tension, to learn how to let go of it, and to recognize what it feels like to be in a relaxed state. Since then the technique has been modified many times but all modern variations of PMR are based on Woodys original idea of systematically squeezing and then releasing isolated muscle groups. Does It Work--and How? PMR works in part by helping to overcome a normal reaction to stress known as the flight-or-fight response. In evolutionary terms, this reaction developed as a way to help animals survive a threat--either by running away or by meeting the opposition head-on.  Over time the flight-or-fight response has become a common reaction to feelings of fear that often are out of proportion with reality. Unfortunately, when it's not needed for actual survival, the flight-or-fight reaction tends to bring on many uncomfortable physical symptoms, including accelerated heart rate, sweating, shaking, and shortness of breath--largely the product of an influx of stress hormones. Also, muscle pain, tension, and stiffness are common symptoms brought on by stress and anxiety. Relaxation techniques, including PMR, have the reverse effect on the body, eliciting the relaxation response, lowering heart rate, calming the mind, and reducing bodily tension. PMR also can help a person become more aware of how their physical stress may be contributing to their emotional state. By relaxing the body, a person may be able to let go of anxious thoughts and feelings. PMR Step-by-Step  For a quick taste of how PMR works, squeeze one of your fists as hard as you can. Notice how tight your fingers and forearm feel. Count to ten and then release the clinch. Allow your hand to relax completely and let go of any tension. Let your hand go limp and notice how relaxed it feels now compared to before your clinched your fist.       This methodical approach to increasing and releasing tension throughout your body is the linchpin of PMR: By systematically constricting and releasing various muscle groups it is possible to relieve physical stress and quiet and calm the mind. Here are the steps for one version of PMR that anyone can do. Try it next time you're feeling nervous, anxious, or find yourself tossing, turning, and unable to sleep. Step 1    Get comfortable. You don't have to lie down to do PMR; it will work if you're sitting up in a chair. Do make sure you're in a place that's free of distraction. Close your eyes if that feels best for you. Step 2    Breathe. Inhale deeply through your nose, feeling your abdomen rise as you fill your body with air.  Then slowly exhale from your mouth, drawing your navel toward your spine. Repeat three to five times. Step 3    Starting with your feet, tighten and release your muscles. Clench your toes and pressing your heels toward the ground. Squeeze tightly for a few breaths and then release. Now flex your feet in, pointing your toes up towards your head. Hold for a few seconds and then release. Step 4    Continue to work your way up your body, tightening and releasing each muscle group. Work your way up in this order: legs, glutes, abdomen, back, hands, arms, shoulders, neck, and face. Try to tighten each muscle group for a few breaths and then slowly release. Repeat any areas that feel especially stiff. Step 5     End the practice by taking a few more deep breaths, noting how much more calm and relaxed you feel. PMR is a skill, one that takes practice to master. In order to be able to draw on PMR when you need it--in other words, when you're truly in a stressful or anxiety-provoking situation--you'll want to learn how to do it while you aren't under pressure. Practice PMR several times a week to become aware of what it's like to feel relaxed. Understanding this feeling can help you to more readily let go of tension when anxiety rises.

## 2022-10-14 ENCOUNTER — HOSPITAL ENCOUNTER (EMERGENCY)
Age: 37
Discharge: HOME | End: 2022-10-15
Attending: STUDENT IN AN ORGANIZED HEALTH CARE EDUCATION/TRAINING PROGRAM
Payer: COMMERCIAL

## 2022-10-14 DIAGNOSIS — R42 LIGHTHEADEDNESS: ICD-10-CM

## 2022-10-14 DIAGNOSIS — Z3A.01 7 WEEKS GESTATION OF PREGNANCY: ICD-10-CM

## 2022-10-14 DIAGNOSIS — O20.9 VAGINAL BLEEDING IN PREGNANCY, FIRST TRIMESTER: ICD-10-CM

## 2022-10-14 DIAGNOSIS — R00.2 HEART PALPITATIONS: Primary | ICD-10-CM

## 2022-10-14 DIAGNOSIS — E87.6 HYPOKALEMIA: ICD-10-CM

## 2022-10-14 LAB
A/G RATIO: 1.5 (ref 1.1–2.2)
ALBUMIN SERPL-MCNC: 4.3 G/DL (ref 3.4–5)
ALP BLD-CCNC: 71 U/L (ref 40–129)
ALT SERPL-CCNC: 18 U/L (ref 10–40)
ANION GAP SERPL CALCULATED.3IONS-SCNC: 12 MMOL/L (ref 3–16)
AST SERPL-CCNC: 16 U/L (ref 15–37)
BILIRUB SERPL-MCNC: 0.3 MG/DL (ref 0–1)
BUN BLDV-MCNC: 13 MG/DL (ref 7–20)
CALCIUM SERPL-MCNC: 8.8 MG/DL (ref 8.3–10.6)
CHLORIDE BLD-SCNC: 100 MMOL/L (ref 99–110)
CO2: 25 MMOL/L (ref 21–32)
CREAT SERPL-MCNC: <0.5 MG/DL (ref 0.6–1.1)
D DIMER: <0.27 UG/ML FEU (ref 0–0.6)
GFR AFRICAN AMERICAN: >60
GFR NON-AFRICAN AMERICAN: >60
GLUCOSE BLD-MCNC: 99 MG/DL (ref 70–99)
HCG QUALITATIVE: POSITIVE
HCT VFR BLD CALC: 38.9 % (ref 36–48)
HEMOGLOBIN: 13 G/DL (ref 12–16)
MAGNESIUM: 2.1 MG/DL (ref 1.8–2.4)
MCH RBC QN AUTO: 29.2 PG (ref 26–34)
MCHC RBC AUTO-ENTMCNC: 33.4 G/DL (ref 31–36)
MCV RBC AUTO: 87.3 FL (ref 80–100)
PDW BLD-RTO: 13.9 % (ref 12.4–15.4)
PLATELET # BLD: 224 K/UL (ref 135–450)
PMV BLD AUTO: 7 FL (ref 5–10.5)
POTASSIUM REFLEX MAGNESIUM: 3.4 MMOL/L (ref 3.5–5.1)
PRO-BNP: 15 PG/ML (ref 0–124)
RBC # BLD: 4.45 M/UL (ref 4–5.2)
SODIUM BLD-SCNC: 137 MMOL/L (ref 136–145)
TOTAL PROTEIN: 7.2 G/DL (ref 6.4–8.2)
TROPONIN: <0.01 NG/ML
WBC # BLD: 13.8 K/UL (ref 4–11)

## 2022-10-14 PROCEDURE — 84703 CHORIONIC GONADOTROPIN ASSAY: CPT

## 2022-10-14 PROCEDURE — 80053 COMPREHEN METABOLIC PANEL: CPT

## 2022-10-14 PROCEDURE — 93005 ELECTROCARDIOGRAM TRACING: CPT | Performed by: STUDENT IN AN ORGANIZED HEALTH CARE EDUCATION/TRAINING PROGRAM

## 2022-10-14 PROCEDURE — 84484 ASSAY OF TROPONIN QUANT: CPT

## 2022-10-14 PROCEDURE — 83880 ASSAY OF NATRIURETIC PEPTIDE: CPT

## 2022-10-14 PROCEDURE — 83735 ASSAY OF MAGNESIUM: CPT

## 2022-10-14 PROCEDURE — 81003 URINALYSIS AUTO W/O SCOPE: CPT

## 2022-10-14 PROCEDURE — 85379 FIBRIN DEGRADATION QUANT: CPT

## 2022-10-14 PROCEDURE — 99284 EMERGENCY DEPT VISIT MOD MDM: CPT

## 2022-10-14 PROCEDURE — 85027 COMPLETE CBC AUTOMATED: CPT

## 2022-10-14 PROCEDURE — 84702 CHORIONIC GONADOTROPIN TEST: CPT

## 2022-10-14 PROCEDURE — 6370000000 HC RX 637 (ALT 250 FOR IP): Performed by: PHYSICIAN ASSISTANT

## 2022-10-14 RX ORDER — POTASSIUM CHLORIDE 20 MEQ/1
40 TABLET, EXTENDED RELEASE ORAL ONCE
Status: COMPLETED | OUTPATIENT
Start: 2022-10-14 | End: 2022-10-14

## 2022-10-14 RX ADMIN — POTASSIUM CHLORIDE 40 MEQ: 1500 TABLET, EXTENDED RELEASE ORAL at 23:50

## 2022-10-14 ASSESSMENT — ENCOUNTER SYMPTOMS
COUGH: 0
EYE REDNESS: 0
SINUS PRESSURE: 0
SHORTNESS OF BREATH: 0
ABDOMINAL PAIN: 0
SINUS PAIN: 0
NAUSEA: 0
RHINORRHEA: 0
CHEST TIGHTNESS: 0
CONSTIPATION: 0
DIARRHEA: 0
SORE THROAT: 0
VOMITING: 0
EYE DISCHARGE: 0

## 2022-10-14 ASSESSMENT — PAIN - FUNCTIONAL ASSESSMENT: PAIN_FUNCTIONAL_ASSESSMENT: NONE - DENIES PAIN

## 2022-10-15 ENCOUNTER — APPOINTMENT (OUTPATIENT)
Dept: ULTRASOUND IMAGING | Age: 37
End: 2022-10-15
Payer: COMMERCIAL

## 2022-10-15 VITALS
HEART RATE: 81 BPM | OXYGEN SATURATION: 99 % | RESPIRATION RATE: 20 BRPM | TEMPERATURE: 98.5 F | SYSTOLIC BLOOD PRESSURE: 100 MMHG | DIASTOLIC BLOOD PRESSURE: 50 MMHG | HEIGHT: 67 IN | BODY MASS INDEX: 45.99 KG/M2 | WEIGHT: 293 LBS

## 2022-10-15 LAB
BILIRUBIN URINE: NEGATIVE
BLOOD, URINE: NEGATIVE
CLARITY: CLEAR
COLOR: YELLOW
EKG ATRIAL RATE: 94 BPM
EKG DIAGNOSIS: NORMAL
EKG P AXIS: 45 DEGREES
EKG P-R INTERVAL: 142 MS
EKG Q-T INTERVAL: 382 MS
EKG QRS DURATION: 86 MS
EKG QTC CALCULATION (BAZETT): 477 MS
EKG R AXIS: 12 DEGREES
EKG T AXIS: 55 DEGREES
EKG VENTRICULAR RATE: 94 BPM
GLUCOSE URINE: NEGATIVE MG/DL
GONADOTROPIN, CHORIONIC (HCG) QUANT: NORMAL MIU/ML
KETONES, URINE: ABNORMAL MG/DL
LEUKOCYTE ESTERASE, URINE: NEGATIVE
MICROSCOPIC EXAMINATION: ABNORMAL
NITRITE, URINE: NEGATIVE
PH UA: 6 (ref 5–8)
PROTEIN UA: NEGATIVE MG/DL
SPECIFIC GRAVITY UA: >=1.03 (ref 1–1.03)
TROPONIN: <0.01 NG/ML
URINE REFLEX TO CULTURE: ABNORMAL
URINE TYPE: ABNORMAL
UROBILINOGEN, URINE: 0.2 E.U./DL

## 2022-10-15 PROCEDURE — 76817 TRANSVAGINAL US OBSTETRIC: CPT

## 2022-10-15 PROCEDURE — 84484 ASSAY OF TROPONIN QUANT: CPT

## 2022-10-15 PROCEDURE — 76801 OB US < 14 WKS SINGLE FETUS: CPT

## 2022-10-15 PROCEDURE — 93010 ELECTROCARDIOGRAM REPORT: CPT | Performed by: INTERNAL MEDICINE

## 2022-10-15 NOTE — ED PROVIDER NOTES
Emergency Department Encounter  Location: [address] [place] [address]  ED    Patient: Riky James  MRN: [id number]  : 1985  Date of evaluation: 10/14/2022  ED Provider: She Xavier MD      Riky James was checked out to me by Ms. Teran. Please see his/her initial documentation for details of the patient's initial ED presentation, physical exam and completed studies. In brief, Riky James is a 30 y.o. female that presented to the emergency department with palpitations. EKG below. No CP/SOB/abd pain. HDS. ATTENDING EKG INTERPRETATION      The Ekg interpreted by me shows  Rhythm NSR  Rate of 94 bpm  Axis is  normal  Intervals and durations are unremarkable. ST Segments: normal, no ST elevation. Compared to prior EKG dated 2016, no significant change.        She Xavier MD  10/14/22 2232    I have reviewed and interpreted all of the currently available lab results and diagnostics from this visit:  Results for orders placed or performed during the hospital encounter of 10/14/22   CBC   Result Value Ref Range    WBC 13.8 (H) 4.0 - 11.0 K/uL    RBC 4.45 4.00 - 5.20 M/uL    Hemoglobin 13.0 12.0 - 16.0 g/dL    Hematocrit 38.9 36.0 - 48.0 %    MCV 87.3 80.0 - 100.0 fL    MCH 29.2 26.0 - 34.0 pg    MCHC 33.4 31.0 - 36.0 g/dL    RDW 13.9 12.4 - 15.4 %    Platelets 173 190 - 854 K/uL    MPV 7.0 5.0 - 10.5 fL   CMP w/ Reflex to MG   Result Value Ref Range    Sodium 137 136 - 145 mmol/L    Potassium reflex Magnesium 3.4 (L) 3.5 - 5.1 mmol/L    Chloride 100 99 - 110 mmol/L    CO2 25 21 - 32 mmol/L    Anion Gap 12 3 - 16    Glucose 99 70 - 99 mg/dL    BUN 13 7 - 20 mg/dL    Creatinine <0.5 (L) 0.6 - 1.1 mg/dL    GFR Non-African American >60 >60    GFR African American >60 >60    Calcium 8.8 8.3 - 10.6 mg/dL    Total Protein 7.2 6.4 - 8.2 g/dL    Albumin 4.3 3.4 - 5.0 g/dL    Albumin/Globulin Ratio 1.5 1.1 - 2.2    Total Bilirubin 0.3 0.0 - 1.0 mg/dL    Alkaline Phosphatase 71 40 - 129 U/L    ALT 18 10 - 40 U/L    AST 16 15 - 37 U/L   Troponin   Result Value Ref Range    Troponin <0.01 <0.01 ng/mL   D-Dimer, Quantitative   Result Value Ref Range    D-Dimer, Quant <0.27 0.00 - 0.60 ug/mL FEU   Brain Natriuretic Peptide   Result Value Ref Range    Pro-BNP 15 0 - 124 pg/mL   HCG Qualitative, Serum   Result Value Ref Range    hCG Qual POSITIVE Detects HCG level >10 MIU/mL   Magnesium   Result Value Ref Range    Magnesium 2.10 1.80 - 2.40 mg/dL   HCG, Quantitative, Pregnancy   Result Value Ref Range    hCG Quant 48020.0 <5.0 mIU/mL   Troponin   Result Value Ref Range    Troponin <0.01 <0.01 ng/mL   Urinalysis with Reflex to Culture    Specimen: Urine   Result Value Ref Range    Color, UA Yellow Straw/Yellow    Clarity, UA Clear Clear    Glucose, Ur Negative Negative mg/dL    Bilirubin Urine Negative Negative    Ketones, Urine TRACE (A) Negative mg/dL    Specific Gravity, UA >=1.030 1.005 - 1.030    Blood, Urine Negative Negative    pH, UA 6.0 5.0 - 8.0    Protein, UA Negative Negative mg/dL    Urobilinogen, Urine 0.2 <2.0 E.U./dL    Nitrite, Urine Negative Negative    Leukocyte Esterase, Urine Negative Negative    Microscopic Examination Not Indicated     Urine Type NotGiven     Urine Reflex to Culture Not Indicated    EKG 12 Lead   Result Value Ref Range    Ventricular Rate 94 BPM    Atrial Rate 94 BPM    P-R Interval 142 ms    QRS Duration 86 ms    Q-T Interval 382 ms    QTc Calculation (Bazett) 477 ms    P Axis 45 degrees    R Axis 12 degrees    T Axis 55 degrees    Diagnosis       Normal sinus rhythmNormal ECGWhen compared with ECG of 12-SEP-2016 14:33,QT has lengthened     US OB LESS THAN 14 WEEKS SINGLE OR FIRST GESTATION    Result Date: 10/15/2022  EXAMINATION: FIRST TRIMESTER OBSTETRIC ULTRASOUND 10/15/2022 TECHNIQUE: Transabdominal and transvaginal first trimester obstetric pelvic ultrasound was performed.  COMPARISON: None HISTORY: ORDERING SYSTEM PROVIDED HISTORY: confirm not ectopic TECHNOLOGIST PROVIDED HISTORY: Reason for exam:->confirm not ectopic Reason for Exam: complex ob history, spotting, concern for ectopic vs mab, initially seen in ed for palpitations and chest pain FINDINGS: Uterus: Uterus size of 10.3 x 4.0 x 5.6 cm. Cervical length of 3.1 cm. Gestational Sac(s):  Single normal appearing gestational sac. No evidence of subchorionic hemorrhage. Yolk Sac:  Present Fetal Pole:  Single fetal pole Crown Rump Length:  0.95 mm Fetal Heart Rate:  130 beats per minute Right ovary: 3.4 x 2.8 x 2.8 cm. Vessels appear patent. Simple 2.6 cm cyst. Left ovary: 3.5 x 1.9 x 1.5 cm. Vessels appear patent. Free fluid: No significant free fluid appreciated. Measurements: Estimated gestational age by current ultrasound: 7 weeks and 0 days Estimated gestational by LMP: 7 weeks and 1 day Estimated Due Date by ultrasound: 06/03/2023     Single live intrauterine pregnancy with an estimated gestational age by ultrasound of 7 weeks and 0 days. US OB TRANSVAGINAL    Result Date: 10/15/2022  EXAMINATION: FIRST TRIMESTER OBSTETRIC ULTRASOUND 10/15/2022 TECHNIQUE: Transabdominal and transvaginal first trimester obstetric pelvic ultrasound was performed. COMPARISON: None HISTORY: ORDERING SYSTEM PROVIDED HISTORY: confirm not ectopic TECHNOLOGIST PROVIDED HISTORY: Reason for exam:->confirm not ectopic Reason for Exam: complex ob history, spotting, concern for ectopic vs mab, initially seen in ed for palpitations and chest pain FINDINGS: Uterus: Uterus size of 10.3 x 4.0 x 5.6 cm. Cervical length of 3.1 cm. Gestational Sac(s):  Single normal appearing gestational sac. No evidence of subchorionic hemorrhage. Yolk Sac:  Present Fetal Pole:  Single fetal pole Crown Rump Length:  0.95 mm Fetal Heart Rate:  130 beats per minute Right ovary: 3.4 x 2.8 x 2.8 cm. Vessels appear patent. Simple 2.6 cm cyst. Left ovary: 3.5 x 1.9 x 1.5 cm. Vessels appear patent. Free fluid: No significant free fluid appreciated. Measurements: Estimated gestational age by current ultrasound: 7 weeks and 0 days Estimated gestational by LMP: 7 weeks and 1 day Estimated Due Date by ultrasound: 06/03/2023     Single live intrauterine pregnancy with an estimated gestational age by ultrasound of 7 weeks and 0 days. Final ED Course and MDM: In brief, Magan Belcher is a 30 y.o. female whose care was signed out to me by the outgoing provider. In brief, pt presented for palpitations, dimer negative, EKG unremarkable, low risk for malignant dysrhythmia, feeling better after period of observation, had also c/o vaginal bleeding, her OB wanted TVUS which showed live IUP, plan to follow up in clinic this coming week. I estimate there is LOW risk for (including but not limited to) INTRACRANIAL HEMORRHAGE, ISCHEMIC CVA,  MENINGITIS, ACUTE CORONARY SYNDROME, MALIGNANT DYSRHYTHMIA, PULMONARY EMBOLISM, PNEUMONIA or SEPSIS, thus I consider the discharge disposition reasonable. Discharge is especially supported since the patient has improvement of presenting symptoms, demonstrates steady ambulation at their baseline in the emergency department on my reassessment, persists to have no focal neurologic deficits on my reexamination, and demonstrates robust p.o. tolerance. Daljit Becker (or their surrogate) and I have discussed the diagnosis and risks, and we agree with discharging home with close follow-up. We also discussed returning to the Emergency Department immediately if new or worsening symptoms occur. We have discussed the symptoms which are most concerning that necessitate immediate return. ED Medication Orders (From admission, onward)      Start Ordered     Status Ordering Provider    10/14/22 [id number] 10/14/22 6306  potassium chloride (KLOR-CON M) extended release tablet 40 mEq  ONCE         Last MAR action: Given - by Pavan Harley on 10/14/22 at [address] [place] [address]            Final Impression      1.  Heart palpitations 2. Lightheadedness    3. 7 weeks gestation of pregnancy    4. Hypokalemia    5.  Vaginal bleeding in pregnancy, first trimester        DISPOSITION Decision To Discharge 10/15/2022 04:00:44 AM     (Please note that portions of this note may have been completed with a voice recognition program. Efforts were made to edit the dictations but occasionally words are mis-transcribed.)    Jenna Szymanski MD  [address] MD  10/15/22 3606

## 2022-10-15 NOTE — ED NOTES
[id number]- Called Patients OB/GYN ([facility])  Per Caldwell Moritz PA RE CP, heart palpitations  [id number]- Paula Ware returned page       Milla Moody  10/15/22 [id number]

## 2022-10-15 NOTE — ED NOTES
Brant Gandhi for d/c. Stable and ambulatory w/ family and all belongings.       Lizette Hale RN  10/15/22 2325

## 2022-10-15 NOTE — ED PROVIDER NOTES
[address] [facility]  ED  EMERGENCY DEPARTMENT ENCOUNTER        Pt Name: Gina Roman  MRN: [id number]  [place] 1985  Date of evaluation: 10/14/2022  Provider: Mayur Langley PA-C  PCP: Alex Lara MD  ED Attending: No att. providers found      This patient was seen and evaluated by the attending physician   I have not independently evaluated this patient. CHIEF COMPLAINT       Chief Complaint   Patient presents with    Palpitations     Reports feeling racing heart at work and intermittently HR going from ; patient HR 97 at triage; denies pain;    Dizziness     Fyi 7 weeks pregnant; reports only hx is gastric       HISTORY OF PRESENT ILLNESS   (Location/Symptom, Timing/Onset, Context/Setting, Quality, Duration, Modifying Factors, Severity)  Note limiting factors. Gina Roman is a 30 y.o. female , [place], 24, for  evaluation of chest pain, feeling her pulse in her ears, chest. Thought possibly it was gas pain. Felt lightheaded with position change. Sudden onset of symptoms one day pta. Feels like a phil from right breast to right shoulder blade. 2/10 pain constant since it started. Denies SOB. H/o cholecystectomy. Pt has a h/o crohn's ds. Reports constipation, one episode of \"pink\" on her TP 2 days ago, felt it was from her rectum but uncertain, may have been vaginal bleeding. Dr Arnulfo Thompson. 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 REVIEW OF SYSTEMS  (2-9 systems for level 4, 10 or more for level 5)     Review of Systems   Constitutional:  Negative for chills and fever. HENT: Negative. Negative for congestion, rhinorrhea, sinus pressure, sinus pain and sore throat. Eyes:  Negative for discharge, redness and visual disturbance. Respiratory:  Negative for cough, chest tightness and shortness of breath.     Cardiovascular:  Positive for chest pain and palpitations. Gastrointestinal:  Negative for abdominal pain, constipation, diarrhea, nausea and vomiting. Genitourinary:  Negative for difficulty urinating, dysuria and frequency. Musculoskeletal: Negative. Skin: Negative. Neurological: Negative. Negative for dizziness, weakness, numbness and headaches. Psychiatric/Behavioral: Negative. All other systems reviewed and are negative. Positivesand Pertinent negatives as per HPI. Except as noted above in the ROS, all other systems were reviewed and negative. PAST MEDICAL HISTORY     Past Medical History:   Diagnosis Date    Abnormal Pap smear of cervix     Anemia     Anxiety     Crohn's colitis ([facility] [address]     Depression     Herniated disc     Hx of blood clots     ? ?  PE during pregnancy    SAB (spontaneous )     x2         SURGICAL HISTORY       Past Surgical History:   Procedure Laterality Date     SECTION      CHOLECYSTECTOMY, LAPAROSCOPIC N/A 2017    COLONOSCOPY N/A 2/3/2021    COLONOSCOPY WITH BIOPSY performed by Amy Stevenson MD at [address]  2008    ERCP           CURRENT MEDICATIONS       Discharge Medication List as of 10/15/2022  4:02 AM        CONTINUE these medications which have NOT CHANGED    Details   progesterone (ENDOMETRIN) 100 MG suppository Place 1 suppository vaginally nightly, Disp-30 suppository, R-0Normal      Fluticasone Propionate (FLONASE NA) by Nasal routeHistorical Med      pyridoxine (B-6) 25 MG tablet Take 1 tablet by mouth in the morning, at noon, and at bedtime, Disp-90 tablet, R-3Normal      Prenatal Vit-Fe Fumarate-FA (PRENATAL FORMULA 3 PO) Take 1 tablet by mouth dailyHistorical Med      mupirocin (BACTROBAN) 2 % ointment Apply twice daily, Historical Med      sulfamethoxazole-trimethoprim (BACTRIM DS;SEPTRA DS) 800-160 MG per tablet TAKE 1 TABLET BY MOUTH EVERY 12 HOURS FOR 10 DAYSHistorical Med certolizumab pegol (CIMZIA PREFILLED) 2 X 200 MG/ML KIT injection Inject 400 mg into the skin every 28 days, Disp-1 kit, R-3Normal      Cholecalciferol (VITAMIN D3) 50 MCG (2000 UT) CAPS Take 2,000 Units by mouth dailyHistorical Med      desvenlafaxine succinate (PRISTIQ) 50 MG TB24 extended release tablet Take 50 mg by mouth dailyHistorical Med      ferrous sulfate (IRON 325) 325 (65 Fe) MG tablet Take 325 mg by mouth 3 times daily (with meals) Historical Med      loratadine (CLARITIN) 10 MG capsule Take 10 mg by mouth daily Historical Med      escitalopram (LEXAPRO) 10 MG tablet Take 10 mg by mouth daily Historical Med      ALPRAZolam (XANAX) 0.5 MG tablet Take 0.5 mg by mouth 3 times daily as needed for Sleep or Anxiety.  Historical Med               ALLERGIES     Remicade [infliximab], Trintellix [vortioxetine], and Doxycycline    FAMILY HISTORY       Family History   Problem Relation Age of Onset    Arthritis Father     High Blood Pressure Father     Crohn's Disease Father     Diabetes Paternal Aunt     Mental Retardation Paternal Aunt     Atrial Fibrillation Maternal Grandmother     High Blood Pressure Maternal Grandmother     Obesity Maternal Grandmother     Stroke Maternal Grandmother     Early Death Maternal Grandfather     Heart Disease Maternal Grandfather     High Blood Pressure Maternal Grandfather     Obesity Maternal Grandfather     Depression Paternal Grandmother     Heart Disease Paternal Grandmother     High Blood Pressure Paternal Grandmother     Kidney Disease Paternal Grandmother     Obesity Paternal Grandmother     Heart Disease Paternal Grandfather     High Blood Pressure Paternal Grandfather     No Known Problems Mother          SOCIAL HISTORY       Social History     Socioeconomic History    Marital status:      Spouse name: None    Number of children: 0    Years of education: None    Highest education level: None   Occupational History    Occupation: RN   Tobacco Use    Smoking status: Never    Smokeless tobacco: Never   Vaping Use    Vaping Use: Never used   Substance and Sexual Activity    Alcohol use: No    Drug use: Yes     Types: Marijuana Jurline Conchis)    Sexual activity: Yes     Partners: Male       SCREENINGS     NIH Score  NIH Stroke Scale  Interval: Baseline  Level of Consciousness (1a): Alert  LOC Questions (1b): Answers both correctly  LOC Commands (1c): Performs both tasks correctly  Best Gaze (2): Normal  Visual (3): No visual loss  Facial Palsy (4): Normal symmetrical movement  Motor Arm, Left (5a): No drift  Motor Arm, Right (5b): No drift  Motor Leg, Left (6a): No drift  Motor Leg, Right (6b): No drift  Limb Ataxia (7): Absent  Sensory (8): Normal  Best Language (9): No aphasia  Dysarthria (10): Normal  Extinction and Inattention (11): No abnormality  Total: 0    [place]ow [place] Coma Scale  Eye Opening: Spontaneous  Best Verbal Response: Oriented  Best Motor Response: Obeys commands  Suresh Coma Scale Score: 15    [facility]     Heart Score         PHYSICAL EXAM    (up to 7 for level 4, 8 ormore for level 5)     ED Triage Vitals [10/14/22 2226]   BP Temp Temp Source Heart Rate Resp SpO2 Height Weight   134/87 98.5 °F (36.9 °C) Oral 94 17 98 % 5' 7\" (1.702 m) (!) 305 lb (138.3 kg)       Physical Exam  Vitals and nursing note reviewed. Constitutional:       Appearance: Normal appearance. She is well-developed. She is obese. She is not diaphoretic. HENT:      Head: Normocephalic and atraumatic. Nose: Nose normal.      Mouth/Throat:      Mouth: Mucous membranes are moist.      Pharynx: No oropharyngeal exudate. Eyes:      General:         Right eye: No discharge. Left eye: No discharge. Extraocular Movements: Extraocular movements intact. Conjunctiva/sclera: Conjunctivae normal.      Pupils: Pupils are equal, round, and reactive to light. Cardiovascular:      Rate and Rhythm: Normal rate and regular rhythm. Heart sounds: Normal heart sounds.  No murmur heard. No friction rub. No gallop. Pulmonary:      Effort: Pulmonary effort is normal. No respiratory distress. Breath sounds: Normal breath sounds. No wheezing. Abdominal:      General: Bowel sounds are normal. There is no distension. Palpations: Abdomen is soft. Tenderness: There is no abdominal tenderness. Musculoskeletal:         General: Normal range of motion. Cervical back: Normal range of motion. Skin:     General: Skin is warm and dry. Capillary Refill: Capillary refill takes less than 2 seconds. Neurological:      General: No focal deficit present. Mental Status: She is alert. Psychiatric:         Mood and Affect: Mood normal.         Behavior: Behavior normal.       DIAGNOSTIC RESULTS   LABS:    Labs Reviewed   CBC - Abnormal; Notable for the following components:       Result Value    WBC 13.8 (*)     All other components within normal limits   COMPREHENSIVE METABOLIC PANEL W/ REFLEX TO MG FOR LOW K - Abnormal; Notable for the following components:    Potassium reflex Magnesium 3.4 (*)     Creatinine <0.5 (*)     All other components within normal limits   URINALYSIS WITH REFLEX TO CULTURE - Abnormal; Notable for the following components:    Ketones, Urine TRACE (*)     All other components within normal limits   TROPONIN   D-DIMER, QUANTITATIVE   BRAIN NATRIURETIC PEPTIDE   HCG, SERUM, QUALITATIVE   MAGNESIUM   HCG, QUANTITATIVE, PREGNANCY   TROPONIN   TROPONIN       All other labs were within normal range or notreturned as of this dictation. EKG: All EKG's are interpreted by the Emergency Department Physician who either signs or Co-signs this chart in the absence of a cardiologist.  Please see their note for interpretation of EKG.       RADIOLOGY:   Interpretation per the Radiologist below, if available at the time of this note:    US OB TRANSVAGINAL   Final Result   Single live intrauterine pregnancy with an estimated gestational age by ultrasound of 7 weeks and 0 days. US OB LESS THAN 14 WEEKS SINGLE OR FIRST GESTATION   Final Result   Single live intrauterine pregnancy with an estimated gestational age by   ultrasound of 7 weeks and 0 days. Is this patient to be included in the SEP-1 Core Measure due to severe sepsis or septic shock? No   Exclusion criteria - the patient is NOT to be included for SEP-1 Core Measure due to:  2+ SIRS criteria are not met     CONSULTS:  Marylou Zavala Cordial and DIFFERENTIAL DIAGNOSIS/MDM:   Vitals:    Vitals:    10/15/22 0026 10/15/22 0056 10/15/22 0126 10/15/22 0405   BP: (!) 103/59 (!) 95/56 (!) 91/52 (!) 100/50   Pulse: 84 86 79 81   Resp: 17 22 21 20   Temp:       TempSrc:       SpO2: 99% 99% 99%    Weight:       Height:           Patient was given the following medications:  Medications   potassium chloride (KLOR-CON M) extended release tablet 40 mEq (40 mEq Oral Given 10/14/22 2350)         Afebrile, stable, patient presents to the ED for evaluation. ED Course as of 10/15/22 1759   Sat Oct 15, 2022   0106 I discussed the patient with on-call OB/GYN Dr Zaki Russell, who advised ultrasound please, as pregnancy not confirmed to be intrauterine. She reports pt called on call physician and advised vaginal bleeding.  [AR]      ED Course User Index  [AR] Anuja Teran PA-C     Non toxic, NAD. Pt offered and refused tylenol for pain control. Vitals are WNL. EKG is normal sinus rhythm. Labs evaluated which indicate a small amount of leukocytosis with a white blood cell count of 13.8. No elevation in troponin. Patient's potassium is mildly decreased at 3.4. This is replaced for the patient. D-dimer is not elevated. Delta troponin is evaluated with no elevation. No elevation in BNP. HCG is now 16,177 up from 2,144 10 days ago. Discussed pt presentation work up and plan with OBGYN, who requests an ultrasound. This is ordered.  Pt signed out to attending at the the conclusion of my shift, he will follow u/s results and disposition planning. All questions are answered. Indications for return to the ED are discussed. Patient is advised if any new or worsening symptoms arise they should immediately return to the emergency room. Follow-up with primary care in 1-2 days. The patient tolerated their visit well. The patient and / or the family were informed of the results of any tests, a time was given to answer questions, a plan was proposed and they agreed Alberteen Marrow.     Results for orders placed or performed during the hospital encounter of 10/14/22   CBC   Result Value Ref Range    WBC 13.8 (H) 4.0 - 11.0 K/uL    RBC 4.45 4.00 - 5.20 M/uL    Hemoglobin 13.0 12.0 - 16.0 g/dL    Hematocrit 38.9 36.0 - 48.0 %    MCV 87.3 80.0 - 100.0 fL    MCH 29.2 26.0 - 34.0 pg    MCHC 33.4 31.0 - 36.0 g/dL    RDW 13.9 12.4 - 15.4 %    Platelets 831 175 - 134 K/uL    MPV 7.0 5.0 - 10.5 fL   CMP w/ Reflex to MG   Result Value Ref Range    Sodium 137 136 - 145 mmol/L    Potassium reflex Magnesium 3.4 (L) 3.5 - 5.1 mmol/L    Chloride 100 99 - 110 mmol/L    CO2 25 21 - 32 mmol/L    Anion Gap 12 3 - 16    Glucose 99 70 - 99 mg/dL    BUN 13 7 - 20 mg/dL    Creatinine <0.5 (L) 0.6 - 1.1 mg/dL    GFR Non-African American >60 >60    GFR African American >60 >60    Calcium 8.8 8.3 - 10.6 mg/dL    Total Protein 7.2 6.4 - 8.2 g/dL    Albumin 4.3 3.4 - 5.0 g/dL    Albumin/Globulin Ratio 1.5 1.1 - 2.2    Total Bilirubin 0.3 0.0 - 1.0 mg/dL    Alkaline Phosphatase 71 40 - 129 U/L    ALT 18 10 - 40 U/L    AST 16 15 - 37 U/L   Troponin   Result Value Ref Range    Troponin <0.01 <0.01 ng/mL   D-Dimer, Quantitative   Result Value Ref Range    D-Dimer, Quant <0.27 0.00 - 0.60 ug/mL FEU   Brain Natriuretic Peptide   Result Value Ref Range    Pro-BNP 15 0 - 124 pg/mL   HCG Qualitative, Serum   Result Value Ref Range    hCG Qual POSITIVE Detects HCG level >10 MIU/mL   Magnesium   Result Value Ref Range    Magnesium 2.10 1.80 - 2.40 mg/dL   HCG, Quantitative, Pregnancy   Result Value Ref Range    hCG Quant 76317.0 <5.0 mIU/mL   Troponin   Result Value Ref Range    Troponin <0.01 <0.01 ng/mL   Urinalysis with Reflex to Culture    Specimen: Urine   Result Value Ref Range    Color, UA Yellow Straw/Yellow    Clarity, UA Clear Clear    Glucose, Ur Negative Negative mg/dL    Bilirubin Urine Negative Negative    Ketones, Urine TRACE (A) Negative mg/dL    Specific Gravity, UA >=1.030 1.005 - 1.030    Blood, Urine Negative Negative    pH, UA 6.0 5.0 - 8.0    Protein, UA Negative Negative mg/dL    Urobilinogen, Urine 0.2 <2.0 E.U./dL    Nitrite, Urine Negative Negative    Leukocyte Esterase, Urine Negative Negative    Microscopic Examination Not Indicated     Urine Type NotGiven     Urine Reflex to Culture Not Indicated    EKG 12 Lead   Result Value Ref Range    Ventricular Rate 94 BPM    Atrial Rate 94 BPM    P-R Interval 142 ms    QRS Duration 86 ms    Q-T Interval 382 ms    QTc Calculation (Bazett) 477 ms    P Axis 45 degrees    R Axis 12 degrees    T Axis 55 degrees    Diagnosis       Normal sinus rhythmNormal ECGWhen compared with ECG of 12-SEP-2016 14:33,QT has lengthenedConfirmed by Neelima Olivares MD, Donis Scale ([id number]) on 10/15/2022 2:58:31 PM       I estimate there is LOW risk for ACUTE CORONARY SYNDROME, INTRACRANIAL HEMORRHAGE, MALIGNANT DYSRHYTHMIA, MENINGITIS, PNEUMONIA, PULMONARY EMBOLISM, SEPSIS, SUBARACHNOID HEMORRHAGE, SUBDURAL HEMATOMA, STROKE, or URINARY TRACT INFECTION, thus I consider the discharge disposition reasonable. Lilly and I have discussed the diagnosis and risks, and we agree with discharging home to follow-up with their primary doctor. We also discussed returning to the Emergency Department immediately if new or worsening symptoms occur.  We have discussed the symptoms which are most concerning (e.g., changing or worsening pain, weakness, vomiting, fever) that necessitate immediate return. Final Impression    1. Heart palpitations    2. Lightheadedness    3. 7 weeks gestation of pregnancy    4. Hypokalemia    5. Vaginal bleeding in pregnancy, first trimester        Blood pressure (!) 100/50, pulse 81, temperature 98.5 °F (36.9 °C), temperature source Oral, resp. rate 20, height 5' 7\" (1.702 m), weight (!) 305 lb (138.3 kg), last menstrual period 08/26/2022, SpO2 99 %, currently breastfeeding. FINAL IMPRESSION      1. Heart palpitations    2. Lightheadedness    3. 7 weeks gestation of pregnancy    4. Hypokalemia    5. Vaginal bleeding in pregnancy, first trimester          DISPOSITION/PLAN   DISPOSITION Decision To Discharge 10/15/2022 04:00:44 AM      PATIENT REFERRED TO:  Abram Andrews MD  [address] [place]  [address]  [phone]      this week as scheduled    [facility]  ED  [address] [facility] [address]  [phone]    If symptoms worsen      DISCHARGE MEDICATIONS:  Discharge Medication List as of 10/15/2022  4:02 AM          DISCONTINUED MEDICATIONS:  Discharge Medication List as of 10/15/2022  4:02 AM        STOP taking these medications       cephALEXin (KEFLEX) 250 MG capsule Comments:   Reason for Stopping:                      Pt was seen during the [place] 19 pandemic. Appropriate PPE worn by ME during patient encounters. Pt seen during a time with constrained hospital bed capacity and other potential inpatient and outpatient resources were constrained due to the viral pandemic.    Please note that portions of this note were completed with a voice recognition program.  Efforts were made to edit the dictations but occasionally words are mis-transcribed.)    Angie Mcgregor PA-C (electronically signed)       Angie Mcgregor PA-C  10/15/22 3338

## 2022-10-18 ENCOUNTER — OFFICE VISIT (OUTPATIENT)
Dept: OBGYN CLINIC | Age: 37
End: 2022-10-18
Payer: COMMERCIAL

## 2022-10-18 ENCOUNTER — INITIAL PRENATAL (OUTPATIENT)
Dept: OBGYN CLINIC | Age: 37
End: 2022-10-18
Payer: COMMERCIAL

## 2022-10-18 VITALS
SYSTOLIC BLOOD PRESSURE: 118 MMHG | BODY MASS INDEX: 48.9 KG/M2 | HEART RATE: 90 BPM | DIASTOLIC BLOOD PRESSURE: 76 MMHG | WEIGHT: 293 LBS

## 2022-10-18 DIAGNOSIS — Z86.32 HISTORY OF GESTATIONAL DIABETES: ICD-10-CM

## 2022-10-18 DIAGNOSIS — F41.9 ANXIETY DISORDER, UNSPECIFIED TYPE: ICD-10-CM

## 2022-10-18 DIAGNOSIS — Z87.59 HISTORY OF MISCARRIAGE: ICD-10-CM

## 2022-10-18 DIAGNOSIS — K50.119 CROHN'S DISEASE OF COLON WITH COMPLICATION (HCC): ICD-10-CM

## 2022-10-18 DIAGNOSIS — O99.210 MATERNAL OBESITY AFFECTING PREGNANCY, ANTEPARTUM: ICD-10-CM

## 2022-10-18 DIAGNOSIS — Z98.891 HISTORY OF CESAREAN DELIVERY: ICD-10-CM

## 2022-10-18 DIAGNOSIS — Z87.59 HISTORY OF ECTOPIC PREGNANCY: ICD-10-CM

## 2022-10-18 DIAGNOSIS — O36.8390 ULTRASOUND SCAN DONE FOR INABILITY TO HEAR FETAL HEART TONES: Primary | ICD-10-CM

## 2022-10-18 DIAGNOSIS — Z34.81 PRENATAL CARE, SUBSEQUENT PREGNANCY IN FIRST TRIMESTER: Primary | ICD-10-CM

## 2022-10-18 DIAGNOSIS — O21.9 NAUSEA AND VOMITING IN PREGNANCY: ICD-10-CM

## 2022-10-18 DIAGNOSIS — Z86.711 HISTORY OF PULMONARY EMBOLUS (PE): ICD-10-CM

## 2022-10-18 LAB
ABDOMINAL CIRCUMFERENCE: NORMAL
BASOPHILS ABSOLUTE: 0.1 K/UL (ref 0–0.2)
BASOPHILS RELATIVE PERCENT: 0.7 %
BIPARIETAL DIAMETER: NORMAL
EOSINOPHILS ABSOLUTE: 0.3 K/UL (ref 0–0.6)
EOSINOPHILS RELATIVE PERCENT: 4.1 %
ESTIMATED FETAL WEIGHT: NORMAL
FEMORAL DIAMETER: NORMAL
HC/AC: NORMAL
HCT VFR BLD CALC: 38.8 % (ref 36–48)
HEAD CIRCUMFERENCE: NORMAL
HEMOGLOBIN: 13.1 G/DL (ref 12–16)
LYMPHOCYTES ABSOLUTE: 2 K/UL (ref 1–5.1)
LYMPHOCYTES RELATIVE PERCENT: 25.2 %
MCH RBC QN AUTO: 29.3 PG (ref 26–34)
MCHC RBC AUTO-ENTMCNC: 33.7 G/DL (ref 31–36)
MCV RBC AUTO: 86.9 FL (ref 80–100)
MONOCYTES ABSOLUTE: 0.4 K/UL (ref 0–1.3)
MONOCYTES RELATIVE PERCENT: 5.4 %
NEUTROPHILS ABSOLUTE: 5 K/UL (ref 1.7–7.7)
NEUTROPHILS RELATIVE PERCENT: 64.6 %
PDW BLD-RTO: 14.3 % (ref 12.4–15.4)
PLATELET # BLD: 207 K/UL (ref 135–450)
PMV BLD AUTO: 7.4 FL (ref 5–10.5)
RBC # BLD: 4.46 M/UL (ref 4–5.2)
WBC # BLD: 7.8 K/UL (ref 4–11)

## 2022-10-18 PROCEDURE — 36415 COLL VENOUS BLD VENIPUNCTURE: CPT | Performed by: OBSTETRICS & GYNECOLOGY

## 2022-10-18 PROCEDURE — 0500F INITIAL PRENATAL CARE VISIT: CPT | Performed by: OBSTETRICS & GYNECOLOGY

## 2022-10-18 PROCEDURE — 76815 OB US LIMITED FETUS(S): CPT | Performed by: OBSTETRICS & GYNECOLOGY

## 2022-10-18 RX ORDER — PROMETHAZINE HYDROCHLORIDE 25 MG/1
25 SUPPOSITORY RECTAL EVERY 6 HOURS PRN
Qty: 20 SUPPOSITORY | Refills: 1 | Status: SHIPPED | OUTPATIENT
Start: 2022-10-18 | End: 2022-11-15 | Stop reason: SDUPTHER

## 2022-10-18 NOTE — PROGRESS NOTES
Maternal emotional well being screening form completed and reviewed with patient. Current score is 4.

## 2022-10-18 NOTE — PROGRESS NOTES
Blood draw from L Antecubital x 1 attempt without difficulty. 3 SST, 0 PST, 2 LAV tubes drawn. Patient tolerated well.  Pastor Johnson LPN

## 2022-10-18 NOTE — PROGRESS NOTES
Initial Prenatal Visit      CC:   Chief Complaint   Patient presents with    Initial Prenatal Visit          HPI: Elenore Homans is a 30 y.o. at 7w4d who presents for initial prenatal visit. Patient is doing well today without complaints. Patient was seen in the ED with nausea and vomiting, as well as spotting after she had a bowel movement. States she was given Phenergan which helped significantly. Is not able to take Zofran right now because it causes muscle spasms. Is not able to keep anything down, not even her prenatals. US in ED showed IUP with positive cardiac activity. States she utilized the vaginal progesterone once. However due to lack of ease of use did not use further. Has not begun to feel fetal movement. Denies pelvic pressure, cramping or contractions. Denies vaginal bleeding, loss of fluid. Denies headache, vision changes, RUQ pain, increased LE edema. Denies chest pain, shortness of breath, fever, chills. Patient has an appointment with Psychiatry on Thursday, plans to wean off [address] [place]. Review of Systems: The following ROS was otherwise negative, except as noted in the HPI: constitutional, HEENT, respiratory, cardiovascular, gastrointestinal, genitourinary, skin, musculoskeletal, neurological, psych    Obstetrical History:  OB History          6    Para   1    Term   1       0    AB   4    Living   1         SAB   2    IAB   0    Ectopic   2    Molar        Multiple   0    Live Births   1          Obstetric Comments   Patient currently breast feeding mensis has not resumed               Past Medical History:   Past Medical History:   Diagnosis Date    Abnormal Pap smear of cervix     Anemia     Anxiety     Crohn's colitis ([facility] [address]     Depression     Herniated disc     Hx of blood clots     ? ? PE during pregnancy    SAB (spontaneous )     x2       Medications:  Prior to Admission medications    Medication Sig Start Date End Date Taking? Authorizing Provider   progesterone (ENDOMETRIN) 100 MG suppository Place 1 suppository vaginally nightly 10/5/22  Yes Sridhar Meyers DO   Fluticasone Propionate (FLONASE NA) by Nasal route   Yes Historical Provider, MD   pyridoxine (B-6) 25 MG tablet Take 1 tablet by mouth in the morning, at noon, and at bedtime 10/4/22 10/4/23 Yes Sridhar Meyers DO   Prenatal Vit-Fe Fumarate-FA (PRENATAL FORMULA 3 PO) Take 1 tablet by mouth daily   Yes Historical Provider, MD   mupirocin (BACTROBAN) 2 % ointment Apply twice daily 21  Yes Historical Provider, MD   sulfamethoxazole-trimethoprim (BACTRIM DS;SEPTRA DS) 800-160 MG per tablet  22  Yes Historical Provider, MD   certolizumab pegol (CIMZIA PREFILLED) 2 X 200 MG/ML KIT injection Inject 400 mg into the skin every 28 days 3/4/22  Yes Kely Andrade MD   Cholecalciferol (VITAMIN D3) 50 MCG (2000 UT) CAPS Take 2,000 Units by mouth daily   Yes Historical Provider, MD   desvenlafaxine succinate (PRISTIQ) 50 MG TB24 extended release tablet Take 50 mg by mouth daily   Yes Historical Provider, MD   ferrous sulfate (IRON 325) 325 (65 Fe) MG tablet Take 325 mg by mouth 3 times daily (with meals)    Yes Historical Provider, MD   loratadine (CLARITIN) 10 MG capsule Take 10 mg by mouth daily  16  Yes Historical Provider, MD   escitalopram (LEXAPRO) 10 MG tablet Take 10 mg by mouth daily    Yes Historical Provider, MD   ALPRAZolam (XANAX) 0.5 MG tablet Take 0.5 mg by mouth 3 times daily as needed for Sleep or Anxiety.    Patient not taking: Reported on 10/18/2022    Historical Provider, MD        Allergies:  Remicade [infliximab], Trintellix [vortioxetine], Zofran [ondansetron hcl], and Doxycycline    Surgical History:  Past Surgical History:   Procedure Laterality Date     SECTION      CHOLECYSTECTOMY, LAPAROSCOPIC N/A 2017    COLONOSCOPY N/A 2/3/2021    COLONOSCOPY WITH BIOPSY performed by Yuri Mark MD at [address] ECTOPIC PREGNANCY SURGERY      ECTOPIC PREGNANCY SURGERY  2008    ERCP         Family History:  Family History   Problem Relation Age of Onset    Arthritis Father     High Blood Pressure Father     Crohn's Disease Father     Diabetes Paternal Aunt     Mental Retardation Paternal Aunt     Atrial Fibrillation Maternal Grandmother     High Blood Pressure Maternal Grandmother     Obesity Maternal Grandmother     Stroke Maternal Grandmother     Early Death Maternal Grandfather     Heart Disease Maternal Grandfather     High Blood Pressure Maternal Grandfather     Obesity Maternal Grandfather     Depression Paternal Grandmother     Heart Disease Paternal Grandmother     High Blood Pressure Paternal Grandmother     Kidney Disease Paternal Grandmother     Obesity Paternal Grandmother     Heart Disease Paternal Grandfather     High Blood Pressure Paternal Grandfather     No Known Problems Mother        Social History:  Social History     Socioeconomic History    Marital status:     Number of children: 0   Occupational History    Occupation: RN   Tobacco Use    Smoking status: Never    Smokeless tobacco: Never   Vaping Use    Vaping Use: Never used   Substance and Sexual Activity    Alcohol use: No    Drug use: Yes     Types: Marijuana (Weed)    Sexual activity: Yes     Partners: Male       Physical Exam:  /76   Pulse 90   Wt (!) 312 lb 3.2 oz (141.6 kg)   LMP 08/26/2022 (Exact Date)   BMI 48.90 kg/m²     Physical Exam  Vitals reviewed. Constitutional:       General: She is not in acute distress. Appearance: She is well-developed. HENT:      Head: Normocephalic and atraumatic. Eyes:      Conjunctiva/sclera: Conjunctivae normal.   Cardiovascular:      Rate and Rhythm: Normal rate. Pulmonary:      Effort: Pulmonary effort is normal. No respiratory distress. Abdominal:      General: There is no distension. Palpations: Abdomen is soft. Tenderness: There is no abdominal tenderness.  There is no guarding or rebound. Musculoskeletal:         General: No swelling. Skin:     General: Skin is warm and dry. Neurological:      Mental Status: She is alert and oriented to person, place, and time. Psychiatric:         Mood and Affect: Mood normal.         Behavior: Behavior normal.         Thought Content: Thought content normal.        Ultrasound  OBSTETRIC ULTRASOUND--1ST TRIMESTER    DATE: 10/18/22    PHYSICIAN: SAFIA New D.O.     SONOGRAPHER: DAVY Davidson RDMS    INDICATION: Amenorrhea    TYPE OF SCAN:  vaginal    FINDINGS:      The cul de sac is normal.  The cervix is normal.  The uterus is gravid. The uterus measures 9.20 cm x 5.49 cm x 4.39 cm. No uterine anomalies are evident. The right ovary is present. The right ovary measures 3.09 cm x 2.69 cm x 2.60 cm. The left ovary is present. The left ovary measures 1.29 cm x 1.09 cm x 1.01 cm. There is a single intrauterine pregnancy identified. A fetal pole is noted with a CRL measuring 0.98 cm, consistent with gestational age of 9weeks and [de-identified] and EDC of 6/6/23. There is a 4 day discrepancy when compared with the gestational age of 7weeks and 4days and EDC of 6/2/23 set by [facility] (8/26/22). Yolk sac is present and measures 0.51 cm. Fetal cardiac activity is present at 121 bpm.     IMPRESSION:    Single IUP with cardiac activity. Imaging is limited secondary to bowel gas. Patient is well aware of the limitations of ultrasound in the detection of anomalies. Assessment/Plan:   Karina Julio is a 30 y.o. [id number] at 7w4d who presents for initial prenatal visit.     1. Prenatal care, subsequent pregnancy in first trimester     - Patient is doing well today      - Fetal wellbeing reassuring by US today     - Maternal wellness questionnaire reviewed - no concerns today, score 4     - IPV labs collected  - HIV Screen  - Hep C AB RLFX HCV PCR-A  - Hepatitis B Surface Antigen  - Type and Screen  - Rubella antibody, IgG  - Varicella Zoster Antibody, IgG  - Drug Screen Multi Urine With Bup  - CBC with Auto Differential  - Syphilis Antibody Cascading Reflex  - TSH with Reflex     - Aneuploidy screen: Desires [id number]     - Carrier screen: Declines     - Pregnancy physiology and precautions reviewed     - Return precautions reviewed     - Will follow-up in 4 weeks for SARINA visit    2. Maternal obesity affecting pregnancy     - Pre-gravid BMI 48.85     - TWG 3 oz     - Reviewed healthy weight gain in pregnancy     - Reviewed Anatomy scan with MFM, serial growth scans and ANFS at 32 weeks     - Plan for early 1 hr screening with her next visit, too nauseous to complete today     3. Nausea and vomiting in pregnancy     - Nausea and vomiting in pregnancy     - Reports is unable to take zofran due to muscle spasms     - Desires Rx for Phenergan suppositories     - Rx sent to pharmacy     - Reviewed conservative measures including adequate hydration and small, frequent meals     - Return precautions reviewed    4. History of  delivery    5. History of gestational diabetes     - Plan for early screening at next visit    6. Anxiety     - Has been recommended to see Psychiatry by PCP      - Is prescribed Xanax, Lexapro, and Pristiq for symptoms     - Reviewed risks of medications in pregnancy     - Will avoid Xanax and plans to wean off Pristiq (has done so in the past)     - Has established with Florina    7. Crohn's      - Currently taking monthly injections (certolizumab)     - Will follow-up closely with GI for recommendations and alternatives, risk for congenital malformations equivalent with baseline population risk     8. History of miscarriage     9. History of ectopic pregnancy     10. History of pulmonary embolus (PE)     - Patient with reported history of PE - however was being treated for pneumonia at the time of symptoms.   Patient declined CT PA and was only treated with baby ASA and not anticoagulated     - Reviewed likely shortness of breath at that time was due to Pneumonia and not PE - at this time will not treat with Lovenox based on this history       Eneida Poon, DO

## 2022-10-19 ENCOUNTER — TELEPHONE (OUTPATIENT)
Dept: PHARMACY | Age: 37
End: 2022-10-19

## 2022-10-19 LAB
ABO/RH: NORMAL
AMPHETAMINE SCREEN, URINE: NORMAL
ANTIBODY SCREEN: NORMAL
BARBITURATE SCREEN URINE: NORMAL
BENZODIAZEPINE SCREEN, URINE: NORMAL
BUPRENORPHINE URINE: NORMAL
CANNABINOID SCREEN URINE: NORMAL
COCAINE METABOLITE SCREEN URINE: NORMAL
FENTANYL SCREEN, URINE: NORMAL
HEPATITIS B SURFACE ANTIGEN INTERPRETATION: NORMAL
HIV AG/AB: NORMAL
HIV ANTIGEN: NORMAL
HIV-1 ANTIBODY: NORMAL
HIV-2 AB: NORMAL
Lab: NORMAL
METHADONE SCREEN, URINE: NORMAL
OPIATE SCREEN URINE: NORMAL
OXYCODONE URINE: NORMAL
PH UA: 6
PHENCYCLIDINE SCREEN URINE: NORMAL
RUBELLA ANTIBODY IGG: 69.2 IU/ML
TOTAL SYPHILLIS IGG/IGM: NORMAL
TSH REFLEX: 1.72 UIU/ML (ref 0.27–4.2)
VARICELLA-ZOSTER VIRUS AB, IGG: NORMAL

## 2022-10-20 LAB
HEPATITIS C VIRUS AB BY CIA INDEX: <0.02 IV
HEPATITIS C VIRUS AB BY CIA INTERPRETATION: NEGATIVE

## 2022-11-15 ENCOUNTER — ROUTINE PRENATAL (OUTPATIENT)
Dept: OBGYN CLINIC | Age: 37
End: 2022-11-15

## 2022-11-15 ENCOUNTER — OFFICE VISIT (OUTPATIENT)
Dept: OBGYN CLINIC | Age: 37
End: 2022-11-15
Payer: COMMERCIAL

## 2022-11-15 VITALS
SYSTOLIC BLOOD PRESSURE: 108 MMHG | DIASTOLIC BLOOD PRESSURE: 68 MMHG | BODY MASS INDEX: 50.18 KG/M2 | WEIGHT: 293 LBS | HEART RATE: 105 BPM

## 2022-11-15 DIAGNOSIS — Z34.81 PRENATAL CARE, SUBSEQUENT PREGNANCY IN FIRST TRIMESTER: Primary | ICD-10-CM

## 2022-11-15 DIAGNOSIS — Z87.59 HISTORY OF MISCARRIAGE: ICD-10-CM

## 2022-11-15 DIAGNOSIS — K50.119 CROHN'S DISEASE OF COLON WITH COMPLICATION (HCC): ICD-10-CM

## 2022-11-15 DIAGNOSIS — Z86.32 HISTORY OF GESTATIONAL DIABETES: ICD-10-CM

## 2022-11-15 DIAGNOSIS — O21.9 NAUSEA AND VOMITING IN PREGNANCY: ICD-10-CM

## 2022-11-15 DIAGNOSIS — Z36.9 ENCOUNTER FOR FETAL ULTRASOUND: Primary | ICD-10-CM

## 2022-11-15 DIAGNOSIS — Z86.711 HISTORY OF PULMONARY EMBOLUS (PE): ICD-10-CM

## 2022-11-15 DIAGNOSIS — O23.41 URINARY TRACT INFECTION IN PREGNANCY, ANTEPARTUM, FIRST TRIMESTER: ICD-10-CM

## 2022-11-15 DIAGNOSIS — F41.9 ANXIETY DISORDER, UNSPECIFIED TYPE: ICD-10-CM

## 2022-11-15 DIAGNOSIS — Z87.59 HISTORY OF ECTOPIC PREGNANCY: ICD-10-CM

## 2022-11-15 DIAGNOSIS — O99.210 MATERNAL OBESITY AFFECTING PREGNANCY, ANTEPARTUM: ICD-10-CM

## 2022-11-15 DIAGNOSIS — Z98.891 HISTORY OF CESAREAN DELIVERY: ICD-10-CM

## 2022-11-15 LAB
ABDOMINAL CIRCUMFERENCE: NORMAL
BIPARIETAL DIAMETER: NORMAL
ESTIMATED FETAL WEIGHT: NORMAL
FEMORAL DIAMETER: NORMAL
HC/AC: NORMAL
HEAD CIRCUMFERENCE: NORMAL

## 2022-11-15 PROCEDURE — 0502F SUBSEQUENT PRENATAL CARE: CPT | Performed by: OBSTETRICS & GYNECOLOGY

## 2022-11-15 PROCEDURE — 76805 OB US >/= 14 WKS SNGL FETUS: CPT | Performed by: OBSTETRICS & GYNECOLOGY

## 2022-11-15 RX ORDER — PROMETHAZINE HYDROCHLORIDE 25 MG/1
25 SUPPOSITORY RECTAL EVERY 6 HOURS PRN
Qty: 20 SUPPOSITORY | Refills: 1 | Status: SHIPPED | OUTPATIENT
Start: 2022-11-15

## 2022-11-15 NOTE — TELEPHONE ENCOUNTER
Specialty Medication Service    Date: 10/19/2022  Patient's Name: Hannah Lu YOB: 1985            _____________________________________________________________________________________________    Left message to schedule Medical Director  appointment for Specialty Medication Services. Please call: [phone] option 4. Will continue to outreach as appropriate.     [place] PharmD  Ambulatory Clinical Pharmacist   Specialty Medication Services   Phone: [phone]  10/19/2022 10:24 AM
Specialty Medication Service    Date: 11/1/2022  Patient's Name: Inderjit Joseph YOB: 1985            _____________________________________________________________________________________________    Left message to schedule Medical Director  appointment for Specialty Medication Services. Please call: [phone] option 4. Will continue to outreach as appropriate.     Iza Obrien, PharmD  Ambulatory Clinical Pharmacist   Specialty Medication Services   Phone: [phone]  11/1/2022 9:41 AM
Specialty Medication Service    Date: 11/15/2022  Patient's Name: Susanna Boxer YOB: 1985            _____________________________________________________________________________________________    Miguel Angel Brown with patient  to schedule Medical Director  appointment for Specialty Medication Services. Medical director appointment scheduled for 11/21/22 at 4:45.     Scottie PortilloD  Ambulatory Clinical Pharmacist   Specialty Medication Services   Phone: [phone]  11/15/2022 5:29 PM    For Pharmacy Admin Tracking Only    CPA in place:  No  Recommendation Provided To: Patient/Caregiver: 1 via Telephone  Intervention Detail: Scheduled Appointment  Intervention Accepted By: Patient/Caregiver: 1  Time Spent (min): 20
Specialty Medication Service    Date: 11/8/2022  Patient's Name: Hannah Lu YOB: 1985            _____________________________________________________________________________________________    Left message to schedule Medical Director  appointment for Specialty Medication Services. Please call: [phone] option 4. Will continue to outreach as appropriate.     Scottie ReyesD  Ambulatory Clinical Pharmacist   Specialty Medication Services   Phone: [phone]  11/8/2022 11:13 AM
finger

## 2022-11-15 NOTE — PROGRESS NOTES
Return OB Office Visit    CC:   Chief Complaint   Patient presents with    Routine Prenatal Visit    Pregnancy Ultrasound       HPI:  Patient seen and examined. Patient is over all doing well. Denies VB, LOF, ctx. Has not begun to feel fetal movement. Denies headaches, vision changes, RUQ pain, increased LE edema. Denies chest pain, shortness of breath, fever, chills, nausea, vomiting. Has discussed with GI and planning to continue Ab Arnold throughout pregnancy, approval from GI. Is weaning off Pristiq and is almost off. Is continuing Lexapro 10mg throughout. Review of Systems: The following ROS was otherwise negative, except as noted in the HPI: constitutional, HEENT, respiratory, cardiovascular, gastrointestinal, genitourinary, skin, musculoskeletal, neurological, psych    Objective:  /68   Pulse (!) 105   Wt (!) 320 lb 6.4 oz (145.3 kg)   LMP 08/26/2022 (Exact Date)   BMI 50.18 kg/m²     Physical Exam  Vitals reviewed. Constitutional:       General: She is not in acute distress. Appearance: She is well-developed. HENT:      Head: Normocephalic and atraumatic. Eyes:      Conjunctiva/sclera: Conjunctivae normal.   Cardiovascular:      Rate and Rhythm: Normal rate. Pulmonary:      Effort: Pulmonary effort is normal. No respiratory distress. Abdominal:      General: There is no distension. Palpations: Abdomen is soft. Tenderness: There is no abdominal tenderness. There is no guarding or rebound. Musculoskeletal:         General: No swelling. Skin:     General: Skin is warm and dry. Neurological:      Mental Status: She is alert and oriented to person, place, and time. Psychiatric:         Mood and Affect: Mood normal.         Behavior: Behavior normal.         Thought Content: Thought content normal.       Ultrasound  OBSTETRIC ULTRASOUND--1ST TRIMESTER    DATE: 11/15/22    PHYSICIAN: SAFIA Delaney D.O.     SONOGRAPHER: DAVY Davidson RDMS    INDICATION: Fetal heart tones    TYPE OF SCAN:  abdominal    FINDINGS:      The cul de sac is normal.  The cervix is normal.  The uterus is gravid. The right ovary is not visualized. The left ovary is not visualized. There is a single intrauterine pregnancy identified. A fetal pole is noted with a CRL measuring 4.27 cm, consistent with gestational age of 9weeks and 4days and EDC of 6/5/23. There is a 3 day discrepancy when compared with the gestational age of 9weeks and 4days and EDC of 6/2/23. Yolk sac is absent. Fetal cardiac activity is present at 171 bpm.     IMPRESSION:    Single IUP with cardiac activity. Imaging is limited secondary to body habitus. Patient is well aware of the limitations of ultrasound in the detection of anomalies. Assessment/Plan:   Magan Belcher is a 30 y.o. [id number] at 11w4d who presents for routine OB visit    1. Prenatal care, subsequent pregnancy in first trimester     - Patient is doing well today      - Fetal wellbeing reassuring by US today     - Continue PNV     - Maternal wellness questionnaire reviewed - no concerns today, score 6     - PNL: A positive/ab neg, R Immune, Varicella immune, HepB non-reactive, HIV non-reactive, HepC non-reactive, Syphilis non-reactive, TSH 1.72, Hgb 13.1, Plt 224, UDS neg, UCx E. faecalis, GCCT neg/neg, Pap NILM, neg HPV 10/4/2022     - Anatomy scan: Plan with MFM with consult     - Aneuploidy screen: Collected today      - Carrier screen: Declines     - Pregnancy physiology and precautions reviewed     - Return precautions reviewed     - Will follow-up in 4 weeks for SARINA visit     2. Maternal obesity affecting pregnancy     - Pre-gravid BMI 48.85     - TWG 8 lbs     - Reviewed healthy weight gain in pregnancy     - Reviewed Anatomy scan with MFM, serial growth scans and ANFS at 32 weeks     - Plan for early 1 hr screening with her next visit, too nauseous to complete today     3.  Nausea and vomiting in pregnancy     - Nausea and vomiting in pregnancy     - Reports is unable to take zofran due to muscle spasms     - Desires Rx for Phenergan suppositories     - Rx sent to pharmacy     - Reviewed conservative measures including adequate hydration and small, frequent meals     - Return precautions reviewed     4. History of  delivery     5. History of gestational diabetes     - Plan for early screening at next visit - did not complete today     6. Anxiety     - Has been recommended to see Psychiatry by PCP      - Is prescribed Xanax, Lexapro, and Pristiq for symptoms     - Reviewed risks of medications in pregnancy     - Will avoid Xanax and plans to wean off Pristiq (has done so in the past)     - Has established with Florina     7. Crohn's      - Currently taking monthly injections (certolizumab)     - Will follow-up closely with GI for recommendations and alternatives, risk for congenital malformations equivalent with baseline population risk     8. History of miscarriage     9. History of ectopic pregnancy     10. History of pulmonary embolus (PE)     - Patient with reported history of PE - however was being treated for pneumonia at the time of symptoms. Patient declined CT PA and was only treated with baby ASA and not anticoagulated     - Reviewed likely shortness of breath at that time was due to Pneumonia and not PE - at this time will not treat with Lovenox based on this history     11. Urinary tract infection in pregnancy, first trimester     - E.  Faecalis on urine culture     - Treated     - Will plan for ALIDA at next visit     Omega Triana DO

## 2022-12-08 ENCOUNTER — TELEPHONE (OUTPATIENT)
Dept: OBGYN CLINIC | Age: 37
End: 2022-12-08

## 2022-12-08 DIAGNOSIS — R30.0 DYSURIA: Primary | ICD-10-CM

## 2022-12-08 PROBLEM — O36.80X0 PREGNANCY OF UNKNOWN ANATOMIC LOCATION: Status: RESOLVED | Noted: 2022-10-12 | Resolved: 2022-12-08

## 2022-12-08 PROBLEM — O21.9 NAUSEA AND VOMITING IN PREGNANCY: Status: ACTIVE | Noted: 2022-12-08

## 2022-12-08 PROBLEM — O23.41: Status: ACTIVE | Noted: 2022-12-08

## 2022-12-08 PROBLEM — Z34.81 PRENATAL CARE, SUBSEQUENT PREGNANCY IN FIRST TRIMESTER: Status: ACTIVE | Noted: 2022-12-08

## 2022-12-08 PROBLEM — O99.210 MATERNAL OBESITY AFFECTING PREGNANCY, ANTEPARTUM: Status: ACTIVE | Noted: 2022-12-08

## 2022-12-08 NOTE — TELEPHONE ENCOUNTER
I had planned to send over with her next visit so that they do not schedule her too early for her anatomy scan, however if she desires to have sent prior to that time it is for Anatomy scan with consult. She needs to have early 1 hr glucose screen performed at her next visit as well if we can make sure that this is noted on her appointment desk. Please have patient trial Tylenol for pain and increase hydration. If pain continues please have patient seek evaluation for UTI in urgent care. Thank you.

## 2022-12-08 NOTE — TELEPHONE ENCOUNTER
Oz for referral at her request.  There is not a standard glucose equivalent, I recommend taking nausea medication prior. Oz for urine cultures in AM.  Thank you.

## 2022-12-08 NOTE — TELEPHONE ENCOUNTER
Pt calling because she has not heard from [facility] regarding her referral. ( Nothing noted in media/ notes) Please advise if order is just for 20 week Anatomy and pt to call and self schedule or if consult needed? Travon Mikey Also pt reports lower back and abdominal pain. She denies any fevers, no urinary symptoms. She says it feels more like muscle pain. Pt advised may be round ligament pain.  Please advise

## 2022-12-08 NOTE — TELEPHONE ENCOUNTER
Pt called and is aware of Physician response. She stated that the last time she had to drink the Glucola she became very sick. She is asking if it would be at all possible to eat a sugar equivalent of the Glucola? Also she would like to have the referral sent over soon as she is afraid it may delay her getting kelton appointment before her 20 week fadi. Pt called back again and stated with her last UTI she had no symptoms but back pain and requested to come in for urine collection to avoid Urgent care.  Placed on lab schedule for am. Please advise

## 2022-12-09 ENCOUNTER — TELEPHONE (OUTPATIENT)
Dept: OBGYN CLINIC | Age: 37
End: 2022-12-09

## 2022-12-09 ENCOUNTER — NURSE ONLY (OUTPATIENT)
Dept: OBGYN CLINIC | Age: 37
End: 2022-12-09

## 2022-12-09 VITALS
TEMPERATURE: 97.9 F | DIASTOLIC BLOOD PRESSURE: 78 MMHG | SYSTOLIC BLOOD PRESSURE: 128 MMHG | BODY MASS INDEX: 50.43 KG/M2 | WEIGHT: 293 LBS | HEART RATE: 78 BPM

## 2022-12-09 DIAGNOSIS — R30.0 DYSURIA: Primary | ICD-10-CM

## 2022-12-09 RX ORDER — CEPHALEXIN 500 MG/1
500 CAPSULE ORAL 4 TIMES DAILY
Qty: 28 CAPSULE | Refills: 0 | Status: SHIPPED | OUTPATIENT
Start: 2022-12-09 | End: 2022-12-16

## 2022-12-10 LAB
BACTERIA: ABNORMAL /HPF
BILIRUBIN URINE: NEGATIVE
BLOOD, URINE: NEGATIVE
CALCIUM OXALATE CRYSTALS: PRESENT
CLARITY: CLEAR
COLOR: ABNORMAL
EPITHELIAL CELLS, UA: 34 /HPF (ref 0–5)
GLUCOSE URINE: NEGATIVE MG/DL
HYALINE CASTS: 0 /LPF (ref 0–8)
KETONES, URINE: ABNORMAL MG/DL
LEUKOCYTE ESTERASE, URINE: ABNORMAL
MICROSCOPIC EXAMINATION: YES
NITRITE, URINE: NEGATIVE
PH UA: 5.5 (ref 5–8)
PROTEIN UA: 30 MG/DL
RBC UA: 4 /HPF (ref 0–4)
SPECIFIC GRAVITY UA: 1.03 (ref 1–1.03)
URINE TYPE: ABNORMAL
UROBILINOGEN, URINE: 0.2 E.U./DL
WBC UA: 8 /HPF (ref 0–5)

## 2022-12-10 NOTE — TELEPHONE ENCOUNTER
Patient calling through answering service with complaints of UTI symptoms. Was seen in the office today to leave a sample for a UA/urine culture, results still pending. Patient was reporting frequency and urgency. Some intermittent flank pain, no fevers (recent temp 98.0F) but has taken Tylenol earlier today. Rx for keflex sent to patient's pharmacy, encouraged her to start using that ASAP and call with any concerning symptoms or signs of worsening infection.     Bryan Cho MD

## 2022-12-11 ENCOUNTER — TELEPHONE (OUTPATIENT)
Dept: OBGYN | Age: 37
End: 2022-12-11

## 2022-12-11 DIAGNOSIS — R10.9 FLANK PAIN IN PREGNANT PATIENT: Primary | ICD-10-CM

## 2022-12-11 DIAGNOSIS — O26.899 FLANK PAIN IN PREGNANT PATIENT: Primary | ICD-10-CM

## 2022-12-11 LAB — URINE CULTURE, ROUTINE: NORMAL

## 2022-12-11 NOTE — TELEPHONE ENCOUNTER
Phone call via call center:  See previous notes. Since last call, patient has been taking keflex as prescribed and pyridium prn  Has continued to experience flank pain and bladder spasms. Has not experienced elevated temperature or fever. Notes temperature intolerance/chills. Admit to some nausea and vomiting as well as headache. Due to refractory nature of symptoms, patient advised to report to ER for evaluation. Patient is concerned with evaluation in the hospital due to recent increase in COVID/flu   UA C&S result reviewed--culture negative. UA positive for trace ketones and calcium oxylate crystals. No blood. CT scan 12/2020--right renal lithiasis--incidental  Patient denies any Crohns symptoms--in remission. Discussed options:   Patient will report to ER for evaluation is symptoms persist/worsen  Will focus on hydration with water and rest  Will plan for repeat UAC&S in AM and renal ultrasound. Consider referral to urology. Future order for UAC&S and ultrasound placed  Patient to call in AM if does not report to ER. Sending as [facility] to staff, Dr. Namrata Trivedi and Dr. Melissa Nation.

## 2022-12-12 ENCOUNTER — ROUTINE PRENATAL (OUTPATIENT)
Dept: OBGYN CLINIC | Age: 37
End: 2022-12-12

## 2022-12-12 ENCOUNTER — APPOINTMENT (OUTPATIENT)
Dept: ULTRASOUND IMAGING | Age: 37
DRG: 832 | End: 2022-12-12
Payer: COMMERCIAL

## 2022-12-12 ENCOUNTER — HOSPITAL ENCOUNTER (INPATIENT)
Age: 37
LOS: 1 days | Discharge: HOME | DRG: 832 | End: 2022-12-15
Attending: OBSTETRICS & GYNECOLOGY | Admitting: OBSTETRICS & GYNECOLOGY
Payer: COMMERCIAL

## 2022-12-12 VITALS
BODY MASS INDEX: 50.37 KG/M2 | WEIGHT: 293 LBS | SYSTOLIC BLOOD PRESSURE: 126 MMHG | DIASTOLIC BLOOD PRESSURE: 84 MMHG | HEART RATE: 75 BPM

## 2022-12-12 DIAGNOSIS — O23.41 URINARY TRACT INFECTION IN MOTHER DURING FIRST TRIMESTER OF PREGNANCY: ICD-10-CM

## 2022-12-12 DIAGNOSIS — O26.899 PELVIC PAIN IN PREGNANCY: Primary | ICD-10-CM

## 2022-12-12 DIAGNOSIS — R10.9 FLANK PAIN: Primary | ICD-10-CM

## 2022-12-12 DIAGNOSIS — N13.30 HYDRONEPHROSIS, UNSPECIFIED HYDRONEPHROSIS TYPE: ICD-10-CM

## 2022-12-12 DIAGNOSIS — R10.2 PELVIC PAIN IN PREGNANCY: Primary | ICD-10-CM

## 2022-12-12 LAB
A/G RATIO: 1.3 (ref 1.1–2.2)
ALBUMIN SERPL-MCNC: 3.9 G/DL (ref 3.4–5)
ALP BLD-CCNC: 58 U/L (ref 40–129)
ALT SERPL-CCNC: 7 U/L (ref 10–40)
ANION GAP SERPL CALCULATED.3IONS-SCNC: 11 MMOL/L (ref 3–16)
AST SERPL-CCNC: 11 U/L (ref 15–37)
BASOPHILS ABSOLUTE: 0.1 K/UL (ref 0–0.2)
BASOPHILS RELATIVE PERCENT: 0.7 %
BILIRUB SERPL-MCNC: 0.3 MG/DL (ref 0–1)
BILIRUBIN URINE: NEGATIVE
BLOOD, URINE: NEGATIVE
BUN BLDV-MCNC: 6 MG/DL (ref 7–20)
CALCIUM SERPL-MCNC: 9.1 MG/DL (ref 8.3–10.6)
CHLORIDE BLD-SCNC: 102 MMOL/L (ref 99–110)
CLARITY: CLEAR
CO2: 22 MMOL/L (ref 21–32)
COLOR: YELLOW
CREAT SERPL-MCNC: <0.5 MG/DL (ref 0.6–1.1)
EOSINOPHILS ABSOLUTE: 0.3 K/UL (ref 0–0.6)
EOSINOPHILS RELATIVE PERCENT: 3.2 %
GFR SERPL CREATININE-BSD FRML MDRD: >60 ML/MIN/{1.73_M2}
GLUCOSE BLD-MCNC: 86 MG/DL (ref 70–99)
GLUCOSE URINE: NEGATIVE MG/DL
HCT VFR BLD CALC: 38.7 % (ref 36–48)
HEMOGLOBIN: 13 G/DL (ref 12–16)
KETONES, URINE: NEGATIVE MG/DL
LEUKOCYTE ESTERASE, URINE: NEGATIVE
LYMPHOCYTES ABSOLUTE: 1.7 K/UL (ref 1–5.1)
LYMPHOCYTES RELATIVE PERCENT: 19.3 %
MCH RBC QN AUTO: 28.6 PG (ref 26–34)
MCHC RBC AUTO-ENTMCNC: 33.6 G/DL (ref 31–36)
MCV RBC AUTO: 85 FL (ref 80–100)
MICROSCOPIC EXAMINATION: NORMAL
MONOCYTES ABSOLUTE: 0.4 K/UL (ref 0–1.3)
MONOCYTES RELATIVE PERCENT: 4.5 %
NEUTROPHILS ABSOLUTE: 6.5 K/UL (ref 1.7–7.7)
NEUTROPHILS RELATIVE PERCENT: 72.3 %
NITRITE, URINE: NEGATIVE
PDW BLD-RTO: 14.2 % (ref 12.4–15.4)
PH UA: 7.5 (ref 5–8)
PLATELET # BLD: 204 K/UL (ref 135–450)
PMV BLD AUTO: 7.4 FL (ref 5–10.5)
POTASSIUM SERPL-SCNC: 3.8 MMOL/L (ref 3.5–5.1)
PROTEIN UA: NEGATIVE MG/DL
RBC # BLD: 4.56 M/UL (ref 4–5.2)
SODIUM BLD-SCNC: 135 MMOL/L (ref 136–145)
SPECIFIC GRAVITY UA: 1.02 (ref 1–1.03)
TOTAL PROTEIN: 6.9 G/DL (ref 6.4–8.2)
URINE TYPE: NORMAL
UROBILINOGEN, URINE: 0.2 E.U./DL
WBC # BLD: 9 K/UL (ref 4–11)

## 2022-12-12 PROCEDURE — 99219 PR INITIAL OBSERVATION CARE/DAY 50 MINUTES: CPT | Performed by: OBSTETRICS & GYNECOLOGY

## 2022-12-12 PROCEDURE — G0378 HOSPITAL OBSERVATION PER HR: HCPCS

## 2022-12-12 PROCEDURE — 81003 URINALYSIS AUTO W/O SCOPE: CPT

## 2022-12-12 PROCEDURE — 6360000002 HC RX W HCPCS: Performed by: OBSTETRICS & GYNECOLOGY

## 2022-12-12 PROCEDURE — 96374 THER/PROPH/DIAG INJ IV PUSH: CPT

## 2022-12-12 PROCEDURE — 2580000003 HC RX 258: Performed by: OBSTETRICS & GYNECOLOGY

## 2022-12-12 PROCEDURE — 0502F SUBSEQUENT PRENATAL CARE: CPT | Performed by: OBSTETRICS & GYNECOLOGY

## 2022-12-12 PROCEDURE — 80053 COMPREHEN METABOLIC PANEL: CPT

## 2022-12-12 PROCEDURE — 87086 URINE CULTURE/COLONY COUNT: CPT

## 2022-12-12 PROCEDURE — 85025 COMPLETE CBC W/AUTO DIFF WBC: CPT

## 2022-12-12 PROCEDURE — 76770 US EXAM ABDO BACK WALL COMP: CPT

## 2022-12-12 PROCEDURE — 6370000000 HC RX 637 (ALT 250 FOR IP): Performed by: OBSTETRICS & GYNECOLOGY

## 2022-12-12 PROCEDURE — G0378 HOSPITAL OBSERVATION PER HR: HCPCS | Performed by: OBSTETRICS & GYNECOLOGY

## 2022-12-12 RX ORDER — FERROUS SULFATE 325(65) MG
325 TABLET ORAL
Status: DISCONTINUED | OUTPATIENT
Start: 2022-12-12 | End: 2022-12-12

## 2022-12-12 RX ORDER — ESCITALOPRAM OXALATE 10 MG/1
10 TABLET ORAL DAILY
Status: DISCONTINUED | OUTPATIENT
Start: 2022-12-13 | End: 2022-12-15 | Stop reason: HOSPADM

## 2022-12-12 RX ORDER — ACETAMINOPHEN 325 MG/1
650 TABLET ORAL EVERY 4 HOURS PRN
Status: DISCONTINUED | OUTPATIENT
Start: 2022-12-12 | End: 2022-12-15 | Stop reason: HOSPADM

## 2022-12-12 RX ORDER — PROMETHAZINE HYDROCHLORIDE 25 MG/1
12.5 TABLET ORAL EVERY 6 HOURS PRN
Status: DISCONTINUED | OUTPATIENT
Start: 2022-12-12 | End: 2022-12-15 | Stop reason: HOSPADM

## 2022-12-12 RX ORDER — SODIUM CHLORIDE, SODIUM LACTATE, POTASSIUM CHLORIDE, CALCIUM CHLORIDE 600; 310; 30; 20 MG/100ML; MG/100ML; MG/100ML; MG/100ML
INJECTION, SOLUTION INTRAVENOUS CONTINUOUS
Status: DISCONTINUED | OUTPATIENT
Start: 2022-12-12 | End: 2022-12-15 | Stop reason: HOSPADM

## 2022-12-12 RX ORDER — PROMETHAZINE HYDROCHLORIDE 25 MG/1
25 SUPPOSITORY RECTAL EVERY 6 HOURS PRN
Status: DISCONTINUED | OUTPATIENT
Start: 2022-12-12 | End: 2022-12-15 | Stop reason: HOSPADM

## 2022-12-12 RX ORDER — CEFTRIAXONE SODIUM 1 G/50ML
1000 INJECTION, SOLUTION INTRAVENOUS EVERY 24 HOURS
Status: DISCONTINUED | OUTPATIENT
Start: 2022-12-12 | End: 2022-12-12

## 2022-12-12 RX ORDER — HYDROCODONE BITARTRATE AND ACETAMINOPHEN 5; 325 MG/1; MG/1
1 TABLET ORAL EVERY 6 HOURS PRN
Status: DISCONTINUED | OUTPATIENT
Start: 2022-12-12 | End: 2022-12-15 | Stop reason: HOSPADM

## 2022-12-12 RX ADMIN — PROMETHAZINE HYDROCHLORIDE 12.5 MG: 25 TABLET ORAL at 13:54

## 2022-12-12 RX ADMIN — SODIUM CHLORIDE, POTASSIUM CHLORIDE, SODIUM LACTATE AND CALCIUM CHLORIDE: 600; 310; 30; 20 INJECTION, SOLUTION INTRAVENOUS at 13:44

## 2022-12-12 RX ADMIN — SODIUM CHLORIDE, POTASSIUM CHLORIDE, SODIUM LACTATE AND CALCIUM CHLORIDE: 600; 310; 30; 20 INJECTION, SOLUTION INTRAVENOUS at 21:57

## 2022-12-12 RX ADMIN — ACETAMINOPHEN 650 MG: 325 TABLET ORAL at 18:02

## 2022-12-12 RX ADMIN — ACETAMINOPHEN 650 MG: 325 TABLET ORAL at 21:55

## 2022-12-12 RX ADMIN — CEFTRIAXONE SODIUM 1000 MG: 1 INJECTION, POWDER, FOR SOLUTION INTRAMUSCULAR; INTRAVENOUS at 13:45

## 2022-12-12 RX ADMIN — ACETAMINOPHEN 650 MG: 325 TABLET ORAL at 13:53

## 2022-12-12 RX ADMIN — HYDROCODONE BITARTRATE AND ACETAMINOPHEN 1 TABLET: 5; 325 TABLET ORAL at 23:11

## 2022-12-12 RX ADMIN — PROMETHAZINE HYDROCHLORIDE 12.5 MG: 25 TABLET ORAL at 19:50

## 2022-12-12 ASSESSMENT — PAIN SCALES - GENERAL
PAINLEVEL_OUTOF10: 5
PAINLEVEL_OUTOF10: 4
PAINLEVEL_OUTOF10: 7
PAINLEVEL_OUTOF10: 5

## 2022-12-12 ASSESSMENT — PAIN - FUNCTIONAL ASSESSMENT
PAIN_FUNCTIONAL_ASSESSMENT: ACTIVITIES ARE NOT PREVENTED
PAIN_FUNCTIONAL_ASSESSMENT: ACTIVITIES ARE NOT PREVENTED

## 2022-12-12 ASSESSMENT — PAIN DESCRIPTION - ORIENTATION
ORIENTATION: RIGHT
ORIENTATION: RIGHT;LEFT

## 2022-12-12 ASSESSMENT — PAIN DESCRIPTION - DESCRIPTORS
DESCRIPTORS: STABBING;DULL
DESCRIPTORS: STABBING;DULL

## 2022-12-12 ASSESSMENT — PAIN DESCRIPTION - LOCATION
LOCATION: BACK
LOCATION: BACK

## 2022-12-12 NOTE — PROGRESS NOTES
Return OB Office Visit    CC:   Chief Complaint   Patient presents with    Other     Acute visit       HPI:  Patient seen and examined. Patient presents today as acute visit today. Patient reports flank and suprapubic pain that started last week and has since intensified. Left a urine sample because of pain with voiding and increased frequency and urgency. Culture was negative, however was started on Keflex Friday night. Was taking pyridium as well, which helped some. Had a low grade fever and some vomiting so she has been taking Tylenol. Has been trying to increase hydration, however is not able to consistently do so because of nausea. Had a positive urine culture in first trimester for E. Faecalis and was treated for such. Denies VB, LOF, ctx. Has not begun to feel fetal movement. Denies headaches, vision changes, RUQ pain, increased LE edema. Denies chest pain, shortness of breath, fever, chills, nausea, vomiting. Review of Systems: The following ROS was otherwise negative, except as noted in the HPI: constitutional, HEENT, respiratory, cardiovascular, gastrointestinal, genitourinary, skin, musculoskeletal, neurological, psych    Objective:  /84   Pulse 75   Wt (!) 321 lb 9.6 oz (145.9 kg)   LMP 08/26/2022 (Exact Date)   BMI 50.37 kg/m²     Physical Exam  Vitals reviewed. Constitutional:       General: She is not in acute distress. Appearance: She is well-developed. HENT:      Head: Normocephalic and atraumatic. Eyes:      Conjunctiva/sclera: Conjunctivae normal.   Cardiovascular:      Rate and Rhythm: Normal rate. Pulmonary:      Effort: Pulmonary effort is normal. No respiratory distress. Abdominal:      General: There is no distension. Palpations: Abdomen is soft. Tenderness: There is no abdominal tenderness. There is no guarding or rebound. Musculoskeletal:         General: No swelling.       Comments: + CVA tenderness on the right   Skin: General: Skin is warm and dry. Neurological:      Mental Status: She is alert and oriented to person, place, and time. Psychiatric:         Mood and Affect: Mood normal.         Behavior: Behavior normal.         Thought Content: Thought content normal.       Ultrasound:   Narrative   OB ULTRASOUND CERVICAL LENGTH       DATE: 12/12/2022       PHYSICIAN: SAFIA Rasheed.       SONOGRAPHER: Andre Casillas RDMS       INDICATION: Cervical length       TYPE OF SCAN: vaginal, abdominal       FINDINGS:   A single viable intrauterine pregnancy is noted with a heart rate of 169    bpm. Cardiac and somatic activity are noted. Transvaginal cervical length is 3.84 cm with no funneling noted. Impression       Single live IUP in the second trimester. Cervical length is 3.84 cm. Imaging is limited secondary to gestational age. The patient is well aware of the limitations of ultrasound in the    detection of anomalies. Alphonso Yusuf DO        Assessment/Plan:   Bee Perez is a 30 y.o. [id number] at 15w3d who presents for routine OB visit    1. Pelvic pain in pregnancy     - Patient with flank pain and urinary symptoms     - Was started on Keflex with no improvement in symptoms     - Has had a low grade fever and nausea     - Has had continued nausea throughout pregnancy and has not been able to keep fluids down. - TVUS today with CL 3.84cm and no other acute findings  - US OB TRANSVAGINAL     - To L&D for admission, IV antibiotics, renal US and urology consult    2. Urinary tract infection in mother during first trimester of pregnancy     - E.  Faecalis in first trimester     - Repeat urine culture today   - Culture, Urine      Alphonso Yusuf DO

## 2022-12-12 NOTE — H&P
Obstetrics Admission History and Physical    CC:   Chief Complaint   Patient presents with    Flank Pain       HPI: Bryan Schuster is a 30 y.o. [id number] at 15w3d who presents with complaints of flank pain and urinary symptoms. Patient initially started with symtpoms on Friday, states over the weekend have since progressed. Having right flank pain, chills (no fevers), bladder spasms. No obvious dysuria but does report urinating small amounts and some hesitancy. +vomiting Saturday however has had this throughout her pregnancy. Denies VB, LOF, uterine cramps. No FM yet. Review of Systems: The following ROS was otherwise negative, except as noted in the HPI: constitutional, HEENT, respiratory, cardiovascular, gastrointestinal, genitourinary, skin, musculoskeletal, neurological, psych. OBGYN Provider : Genevieve Lopez    Obstetrical History:  OB History    Para Term  AB Living   6 1 1 0 4 1   SAB IAB Ectopic Molar Multiple Live Births   2 0 2 0 0 1      # Outcome Date GA Lbr Anil/2nd Weight Sex Delivery Anes PTL Lv   6 Current            5 Term 17 39w5d   M CS-LTranv EPI N PARTHA      Complications: Failure to Progress in First Stage      Name: Grady Polo: 9  Apgar5: 9   4 Ectopic 14           3 SAB 14 8w0d          2 SAB 12 4w0d          1 Ectopic 08 7w0d             Obstetric Comments   Patient currently breast feeding mensis has not resumed       Past Medical History:   Past Medical History:   Diagnosis Date    Abnormal Pap smear of cervix     Anemia     Anxiety     Crohn's colitis ([facility] [address]     Depression     Herniated disc     Hx of blood clots     ? ? PE during pregnancy    SAB (spontaneous )     x2       Medications:  No current facility-administered medications on file prior to encounter.      Current Outpatient Medications on File Prior to Encounter   Medication Sig Dispense Refill    cephALEXin (KEFLEX) 500 MG capsule Take 1 capsule by mouth 4 times daily for 7 days 28 capsule 0    promethazine (PROMETHEGAN) 25 MG suppository Place 1 suppository rectally every 6 hours as needed for Nausea 20 suppository 1    progesterone (ENDOMETRIN) 100 MG suppository Place 1 suppository vaginally nightly (Patient not taking: Reported on 2022) 30 suppository 0    Fluticasone Propionate (FLONASE NA) by Nasal route      pyridoxine (B-6) 25 MG tablet Take 1 tablet by mouth in the morning, at noon, and at bedtime 90 tablet 3    Prenatal Vit-Fe Fumarate-FA (PRENATAL FORMULA 3 PO) Take 1 tablet by mouth daily      mupirocin (BACTROBAN) 2 % ointment       sulfamethoxazole-trimethoprim (BACTRIM DS;SEPTRA DS) 800-160 MG per tablet  (Patient not taking: Reported on )      certolizumab pegol (CIMZIA PREFILLED) 2 X 200 MG/ML KIT injection Inject 400 mg into the skin every 28 days 1 kit 3    Cholecalciferol (VITAMIN D3) 50 MCG (2000 UT) CAPS Take 2,000 Units by mouth daily      desvenlafaxine succinate (PRISTIQ) 50 MG TB24 extended release tablet Take 50 mg by mouth daily (Patient not taking: No sig reported)      ferrous sulfate (IRON 325) 325 (65 Fe) MG tablet Take 325 mg by mouth 3 times daily (with meals)       loratadine (CLARITIN) 10 MG capsule Take 10 mg by mouth daily       escitalopram (LEXAPRO) 10 MG tablet Take 10 mg by mouth daily       ALPRAZolam (XANAX) 0.5 MG tablet Take 0.5 mg by mouth 3 times daily as needed for Sleep or Anxiety.  (Patient not taking: Reported on 2022)         Allergies:  Remicade [infliximab], Trintellix [vortioxetine], Zofran [ondansetron hcl], Doxycycline, and Reglan [metoclopramide]    Surgical History:  Past Surgical History:   Procedure Laterality Date     SECTION      CHOLECYSTECTOMY, LAPAROSCOPIC N/A 2017    COLONOSCOPY N/A 2/3/2021    COLONOSCOPY WITH BIOPSY performed by Nannette Matos MD at [address]      ERCP Family History:  Family History   Problem Relation Age of Onset    Arthritis Father     High Blood Pressure Father     Crohn's Disease Father     Diabetes Paternal Aunt     Mental Retardation Paternal Aunt     Atrial Fibrillation Maternal Grandmother     High Blood Pressure Maternal Grandmother     Obesity Maternal Grandmother     Stroke Maternal Grandmother     Early Death Maternal Grandfather     Heart Disease Maternal Grandfather     High Blood Pressure Maternal Grandfather     Obesity Maternal Grandfather     Depression Paternal Grandmother     Heart Disease Paternal Grandmother     High Blood Pressure Paternal Grandmother     Kidney Disease Paternal Grandmother     Obesity Paternal Grandmother     Heart Disease Paternal Grandfather     High Blood Pressure Paternal Grandfather     No Known Problems Mother        Social History:  Social History     Substance and Sexual Activity   Alcohol Use No     Social History     Substance and Sexual Activity   Drug Use Yes    Types: Marijuana (Weed)     Social History     Tobacco Use   Smoking Status Never   Smokeless Tobacco Never       Physical Exam:  BP (!) 118/55   Pulse 90   Temp 98 °F (36.7 °C) (Oral)   Resp 18   Ht 5' 7\" (1.702 m)   LMP 08/26/2022 (Exact Date)   BMI 50.37 kg/m²   Physical Exam  HENT:      Head: Normocephalic. Cardiovascular:      Rate and Rhythm: Normal rate. Pulmonary:      Effort: Pulmonary effort is normal.   Abdominal:      Palpations: Abdomen is soft. Tenderness: There is no abdominal tenderness. There is right CVA tenderness. There is no left CVA tenderness, guarding or rebound. Musculoskeletal:         General: Normal range of motion. Skin:     General: Skin is warm and dry. Neurological:      General: No focal deficit present. Mental Status: She is alert.    Psychiatric:         Mood and Affect: Mood normal.       Fetal Heart Tones:   FHT: 160s in office today before arrival    Labs:  No visits with results within 1 Day(s) from this visit. Latest known visit with results is:   Nurse Only on 12/09/2022   Component Date Value    Color, UA 12/09/2022 DARK YELLOW (A)     Clarity, UA 12/09/2022 Clear     Glucose, Ur 12/09/2022 Negative     Bilirubin Urine 12/09/2022 Negative     Ketones, Urine 12/09/2022 TRACE (A)     Specific [place], UA 12/09/2022 1.032     Blood, Urine 12/09/2022 Negative     pH, UA 12/09/2022 5.5     Protein, UA 12/09/2022 30 (A)     Urobilinogen, Urine 12/09/2022 0.2     Nitrite, Urine 12/09/2022 Negative     Leukocyte Esterase, Urine 12/09/2022 TRACE (A)     Microscopic Examination 12/09/2022 YES     Urine Type 12/09/2022 Cleancatch     Bacteria, UA 12/09/2022 2+ (A)     Hyaline Casts, UA 12/09/2022 0     WBC, UA 12/09/2022 8 (A)     RBC, UA 12/09/2022 4     Epithelial Cells, UA 12/09/2022 34 (A)     Calcium Oxalate Crystals 12/09/2022 Present (A)     Urine Culture, Routine 12/09/2022 >50,000 CFU/ml mixed skin/urogenital holly. No further workup        Assessment/Plan:   30 y.o. [id number] at 15w3d here with right flank pain    FWB   - reassuring, normal FHT on US in office today    2.  Right flank pain  - recent Ucx negative, however patient with progressing symptoms over the weekend  - will send repeat UA/Urine culture, CBC/CMP daily  - start empiric abx (rocephin 1gm q24hr while in-house)  - renal US ordered   - IVF, antiemetics PRN  - PRN pain meds  - cervical length wnl on TVUS in office 12/12    Dispo: admit for obs, IV abx, imaging  Richi Page MD

## 2022-12-13 LAB
GLUCOSE CHALLENGE: 89 MG/DL
URINE CULTURE, ROUTINE: NORMAL

## 2022-12-13 PROCEDURE — 82950 GLUCOSE TEST: CPT

## 2022-12-13 PROCEDURE — 99224 PR SBSQ OBSERVATION CARE/DAY 15 MINUTES: CPT | Performed by: OBSTETRICS & GYNECOLOGY

## 2022-12-13 PROCEDURE — 96376 TX/PRO/DX INJ SAME DRUG ADON: CPT

## 2022-12-13 PROCEDURE — 6370000000 HC RX 637 (ALT 250 FOR IP): Performed by: OBSTETRICS & GYNECOLOGY

## 2022-12-13 PROCEDURE — G0378 HOSPITAL OBSERVATION PER HR: HCPCS

## 2022-12-13 PROCEDURE — 36415 COLL VENOUS BLD VENIPUNCTURE: CPT

## 2022-12-13 PROCEDURE — 2580000003 HC RX 258: Performed by: OBSTETRICS & GYNECOLOGY

## 2022-12-13 PROCEDURE — 6360000002 HC RX W HCPCS: Performed by: OBSTETRICS & GYNECOLOGY

## 2022-12-13 PROCEDURE — 96372 THER/PROPH/DIAG INJ SC/IM: CPT

## 2022-12-13 RX ORDER — PROMETHAZINE HYDROCHLORIDE 25 MG/1
25 SUPPOSITORY RECTAL EVERY 6 HOURS PRN
Status: DISCONTINUED | OUTPATIENT
Start: 2022-12-13 | End: 2022-12-13 | Stop reason: SDUPTHER

## 2022-12-13 RX ORDER — PROMETHAZINE HYDROCHLORIDE 25 MG/ML
6.25 INJECTION, SOLUTION INTRAMUSCULAR; INTRAVENOUS EVERY 6 HOURS PRN
Status: DISCONTINUED | OUTPATIENT
Start: 2022-12-13 | End: 2022-12-15 | Stop reason: HOSPADM

## 2022-12-13 RX ADMIN — PROMETHAZINE HYDROCHLORIDE 6.25 MG: 25 INJECTION INTRAMUSCULAR; INTRAVENOUS at 20:49

## 2022-12-13 RX ADMIN — SODIUM CHLORIDE, POTASSIUM CHLORIDE, SODIUM LACTATE AND CALCIUM CHLORIDE: 600; 310; 30; 20 INJECTION, SOLUTION INTRAVENOUS at 20:38

## 2022-12-13 RX ADMIN — PROMETHAZINE HYDROCHLORIDE 12.5 MG: 25 TABLET ORAL at 09:36

## 2022-12-13 RX ADMIN — CEFTRIAXONE SODIUM 1000 MG: 1 INJECTION, POWDER, FOR SOLUTION INTRAMUSCULAR; INTRAVENOUS at 13:14

## 2022-12-13 RX ADMIN — HYDROCODONE BITARTRATE AND ACETAMINOPHEN 1 TABLET: 5; 325 TABLET ORAL at 16:49

## 2022-12-13 RX ADMIN — HYDROCODONE BITARTRATE AND ACETAMINOPHEN 1 TABLET: 5; 325 TABLET ORAL at 23:12

## 2022-12-13 RX ADMIN — PROMETHAZINE HYDROCHLORIDE 25 MG: 25 SUPPOSITORY RECTAL at 15:49

## 2022-12-13 RX ADMIN — SODIUM CHLORIDE, POTASSIUM CHLORIDE, SODIUM LACTATE AND CALCIUM CHLORIDE: 600; 310; 30; 20 INJECTION, SOLUTION INTRAVENOUS at 05:42

## 2022-12-13 RX ADMIN — ACETAMINOPHEN 650 MG: 325 TABLET ORAL at 09:36

## 2022-12-13 RX ADMIN — ESCITALOPRAM OXALATE 10 MG: 10 TABLET ORAL at 08:37

## 2022-12-13 ASSESSMENT — PAIN SCALES - GENERAL
PAINLEVEL_OUTOF10: 6
PAINLEVEL_OUTOF10: 5
PAINLEVEL_OUTOF10: 6

## 2022-12-13 ASSESSMENT — PAIN DESCRIPTION - LOCATION
LOCATION: ABDOMEN
LOCATION: BACK;FLANK
LOCATION: BACK

## 2022-12-13 ASSESSMENT — PAIN - FUNCTIONAL ASSESSMENT: PAIN_FUNCTIONAL_ASSESSMENT: ACTIVITIES ARE NOT PREVENTED

## 2022-12-13 ASSESSMENT — PAIN DESCRIPTION - ORIENTATION
ORIENTATION: RIGHT
ORIENTATION: RIGHT

## 2022-12-13 ASSESSMENT — PAIN DESCRIPTION - DESCRIPTORS
DESCRIPTORS: ACHING
DESCRIPTORS: DULL
DESCRIPTORS: DULL

## 2022-12-13 NOTE — PROGRESS NOTES
[facility] Obstetrics and Gynecology  Antepartum Progress Note  HPI:  Ankush Fraser is a 30 y.o. [id number] at 15w4d admitted for flank pain and urinary hesitancy with incomplete bladder emptying which started Friday, 12/2/2022. Patient reports that she was seeing her rheumatologist for routine follow-up for her RA and Crohn's when she first reported the symptoms. She also called the OB office and was prescribed Keflex 500 mg 4 times daily and completed 8 doses however the symptoms persisted. She endorses chills without fever, bladder spasms, incomplete emptying, hesitancy and 2-3 episodes of pneumaturia unclear if vaginal flatulence. Some emesis but reports this is her baseline. She denies dysuria, hematuria, vaginal bleeding, itching, or discharge, or palpable organ prolapse. She reports the flank pain started bilaterally however has now migrated to primarily the right side. Describes a sharp stabbing pain, 7 out of 10 at its worst.  She reports taking 1 dose of OTC Azo (methenamine, sodium salicylate) with minor improvement of symptoms. CBC and CMP were unremarkable. UA negative with culture pending. Patient was started on empiric antibiotics with Rocephin 1 g every 24 hours. IV hydration with  mL/h. As needed acetaminophen and Phenergan 12.5 mg.    Renal ultrasound 12/12 revealing mild right hydronephrosis, with limited urinary bladder evaluation due to bladder collapse. Of note CT abdomen pelvis 12/29/2020 revealing stable small nonobstructing right renal calculus. Subjective:  Patient seen today at the bedside resting comfortably. She endorses increased urinary hesitancy with incomplete emptying. Denies dysuria, hematuria, organ prolapse. Continues to endorse right flank pain moderately controlled with PRN pain medication    No fetal movement yet. Denies vaginal bleeding, loss of fluid, regular contractions. Denies headaches, vision changes, RUQ pain, increased LE edema. Denies chest pain, shortness of breath, fever. Endorses baseline nausea, vomiting. Review of Systems - The following ROS was otherwise negative, except as noted in the HPI: constitutional, respiratory, cardiovascular, gastrointestinal, genitourinary    Objective:  BP (!) 110/53   Pulse 84   Temp 98.1 °F (36.7 °C) (Oral)   Resp 16   Ht 5' 7\" (1.702 m)   LMP 08/26/2022 (Exact Date)   SpO2 99%   BMI 50.37 kg/m²       Physical Exam  Constitutional:       Appearance: Normal appearance. HENT:      Head: Normocephalic and atraumatic. Right Ear: External ear normal.      Left Ear: External ear normal.   Eyes:      Conjunctiva/sclera: Conjunctivae normal.      Pupils: Pupils are equal, round, and reactive to light. Cardiovascular:      Rate and Rhythm: Normal rate and regular rhythm. Pulmonary:      Effort: Pulmonary effort is normal.      Breath sounds: Normal breath sounds. No wheezing or rales. Abdominal:      General: Abdomen is protuberant. Palpations: Abdomen is soft. Tenderness: There is right CVA tenderness. There is no left CVA tenderness or rebound. Neurological:      General: No focal deficit present. Mental Status: She is alert and oriented to person, place, and time. Psychiatric:         Mood and Affect: Mood normal.         Behavior: Behavior normal.   Vitals reviewed.         Fetal Heart Tones:   FHT: 160s in office yesterday before arrival    Home Meds:    Medications Prior to Admission: cephALEXin (KEFLEX) 500 MG capsule, Take 1 capsule by mouth 4 times daily for 7 days  promethazine (PROMETHEGAN) 25 MG suppository, Place 1 suppository rectally every 6 hours as needed for Nausea  progesterone (ENDOMETRIN) 100 MG suppository, Place 1 suppository vaginally nightly (Patient not taking: Reported on 12/12/2022)  Fluticasone Propionate (FLONASE NA), by Nasal route  pyridoxine (B-6) 25 MG tablet, Take 1 tablet by mouth in the morning, at noon, and at bedtime  Prenatal Vit-Fe Fumarate-FA (PRENATAL FORMULA 3 PO), Take 1 tablet by mouth daily  mupirocin (BACTROBAN) 2 % ointment,   sulfamethoxazole-trimethoprim (BACTRIM DS;SEPTRA DS) 800-160 MG per tablet,   certolizumab pegol (CIMZIA PREFILLED) 2 X 200 MG/ML KIT injection, Inject 400 mg into the skin every 28 days  Cholecalciferol (VITAMIN D3) 50 MCG (2000 UT) CAPS, Take 2,000 Units by mouth daily  desvenlafaxine succinate (PRISTIQ) 50 MG TB24 extended release tablet, Take 50 mg by mouth daily (Patient not taking: No sig reported)  ferrous sulfate (IRON 325) 325 (65 Fe) MG tablet, Take 325 mg by mouth 3 times daily (with meals)   loratadine (CLARITIN) 10 MG capsule, Take 10 mg by mouth daily   escitalopram (LEXAPRO) 10 MG tablet, Take 10 mg by mouth daily   ALPRAZolam (XANAX) 0.5 MG tablet, Take 0.5 mg by mouth 3 times daily as needed for Sleep or Anxiety.  (Patient not taking: Reported on 02/40/8116)  certolizumab pegol (CIMZIA PREFILLED) 2 X 200 MG/ML KIT injection Inject 400 mg into the skin every 28 days 3/4/22    Labs:  Lab Results   Component Value Date/Time    COLORU Yellow 12/12/2022 12:00 PM    NITRU Negative 12/12/2022 12:00 PM    GLUCOSEU Negative 12/12/2022 12:00 PM    KETUA Negative 12/12/2022 12:00 PM    UROBILINOGEN 0.2 12/12/2022 12:00 PM    BILIRUBINUR Negative 12/12/2022 12:00 PM    BILIRUBINUR neg 04/14/2013 07:19 PM     Lab Results   Component Value Date    WBC 9.0 12/12/2022    HGB 13.0 12/12/2022    HCT 38.7 12/12/2022    MCV 85.0 12/12/2022     12/12/2022     Lab Results   Component Value Date     (L) 12/12/2022    K 3.8 12/12/2022     12/12/2022    CO2 22 12/12/2022    BUN 6 (L) 12/12/2022    CREATININE <0.5 (L) 12/12/2022    GLUCOSE 86 12/12/2022    CALCIUM 9.1 12/12/2022    PROT 6.9 12/12/2022    LABALBU 3.9 12/12/2022    BILITOT 0.3 12/12/2022    ALKPHOS 58 12/12/2022    AST 11 (L) 12/12/2022    ALT 7 (L) 12/12/2022    LABGLOM >60 12/12/2022    GFRAA >60 10/14/2022    AGRATIO 1.3 12/12/2022    GLOB 3.3 03/30/2021     US Result (most recent):  US RENAL COMPLETE 12/12/2022    Narrative  EXAMINATION:  RETROPERITONEAL ULTRASOUND OF THE KIDNEYS AND URINARY BLADDER    12/12/2022    COMPARISON:  CT abdomen/pelvis 12/29/2020    HISTORY:  ORDERING SYSTEM PROVIDED HISTORY: right flank pain  TECHNOLOGIST PROVIDED HISTORY:    Reason for exam:->right flank pain    FINDINGS:    Kidneys: The right kidney measures 13.7 x 6.4 x 6.8 cm in size and the left kidney  measures 11.1 x 5.4 x 6.3 cm in size. Kidneys demonstrate normal cortical echogenicity. There is mild right  hydronephrosis without demonstrable cause. There is no evidence of left  hydronephrosis. No definite renal calculi are identified. No solid renal  mass is seen. Bladder: The urinary bladder is collapsed, limiting its evaluation. Impression  1. Mild right hydronephrosis, without demonstrable cause. Given patient  history, consider a dedicated renal stone protocol CT study for further  characterization. 2. Unremarkable sonographic appearance of the left kidney. 3. Collapsed urinary bladder, limiting its evaluation. CT Result (most recent):  CT ABDOMEN PELVIS W IV CONTRAST 12/29/2020    Narrative  EXAMINATION:  CT OF THE ABDOMEN AND PELVIS WITH CONTRAST 12/29/2020 9:46 am    TECHNIQUE:  CT of the abdomen and pelvis was performed with the administration of  intravenous contrast. Multiplanar reformatted images are provided for review. Dose modulation, iterative reconstruction, and/or weight based adjustment of  the mA/kV was utilized to reduce the radiation dose to as low as reasonably  achievable. COMPARISON:  07/31/2017  Impression  No acute intra-abdominal or pelvic abnormality. Cholecystectomy.     Stable small nonobstructing right renal calculus        Assessment/Plan:  30 y.o. [id number] at 15w4d (Estimated Date of Delivery: 6/2/23) admitted for flank pain and urinary hesitancy with incomplete bladder emptying which started Friday 12/2/2022. 1. Fetal Well Being   -reassuring, normal FHT in office 12/12 on US  2. Right flank pain/   - Ddx: UTI, cystitis, retroverted uterus, nephrolithiasis  - UA negative, culture pending  - cervical length wnl on TVUS in office 12/12  - continue empiric Rocephin 1 gm q24hr  - continue IV hydration  ml/hr  - PRN pain and nausea medications  - daily labs: CBC/CMP  - monitor I&Os  - consider straight cath or bladder scan to assess postvoid residual volume  - urology consulted    Krupa Johnson DO, PGY-1   [address] [facility], [place], operated by [facility]        31 y/o [id number] female at 15 weeks 4 days gestation. Seen and evaluated in conjunction with resident. Chart review, examination and findings as above. Testing planned for postvoid residual    Patient seen by Urology--see consult. Continue IVF hydration. Strain all urine. Continue to monitor. NPO after midnight in the event urologic intervention necessary  Culture, Urine  Order: [id number]  Status: Final result    Visible to patient: Yes (seen)    Next appt: 12/19/2022 at 09:00 AM in Obstetrics and Gynecology [facility]    Specimen Information: Urine voided   0 Result Notes  Component 12/12/22 1200    Urine Culture, Routine No growth at 18 to 36 hours    Resulting Agency [facility]           Narrative  Performed by: [facility]  ORDER#: [id number]                          ORDERED BY: Cassia Pal   SOURCE: Urine Voided                       COLLECTED:  12/12/22 12:00   ANTIBIOTICS AT [place].:                      RECEIVED :  12/12/22 23:46           I saw and evaluated the patient, performing the key elements of the service. I discussed the findings, assessment and plan with the resident and agree with the resident's findings and plan as documented in the resident's note. Julissa Lan D.O.    [facility]

## 2022-12-13 NOTE — PLAN OF CARE
Problem: Pain  Goal: Verbalizes/displays adequate comfort level or baseline comfort level  Outcome: Progressing  Flowsheets (Taken 12/12/2022 1954)  Verbalizes/displays adequate comfort level or baseline comfort level:   Encourage patient to monitor pain and request assistance   Assess pain using appropriate pain scale   Administer analgesics based on type and severity of pain and evaluate response   Implement non-pharmacological measures as appropriate and evaluate response     Problem: Safety - Adult  Goal: Free from fall injury  Outcome: Progressing     Problem: Chronic Conditions and Co-morbidities  Goal: Patient's chronic conditions and co-morbidity symptoms are monitored and maintained or improved  Outcome: Progressing  Flowsheets (Taken 12/12/2022 1954)  Care Plan - Patient's Chronic Conditions and Co-Morbidity Symptoms are Monitored and Maintained or Improved: Monitor and assess patient's chronic conditions and comorbid symptoms for stability, deterioration, or improvement

## 2022-12-13 NOTE — PROGRESS NOTES
----- Message from Tonia Quiroga MA sent at 8/12/2020  9:39 AM EDT -----  Regarding: [facility] internal; dexa  08/12/20 9:40 AM    Hello, our patient Nimco Calloway has had DEXA Scan completed/performed  Please assist in updating the patient chart by pulling the Care Everywhere (CE) document  The date of service is 07/2019       Thank you,  Tonia Quiroga MA  [facility] Call placed to Dr. Josephine Levin with pt update. Physician informed of pt's increasing frequency of urination and intensifying pain during urination which patient describes as stabbing pain which radiated from center of her lower back to both flanks. Dr. Josephine Levin to review pt's chart and call back.

## 2022-12-13 NOTE — PROGRESS NOTES
Call returned from Dr. Kaela Espinoza. Order received for Rudy Robles, 1 tablet Q 6 PRN for pt's pain.

## 2022-12-13 NOTE — CONSULTS
Urology Consult Note      Reason for Consultation: bladder spasms, hydronephrosis     Chief Complaint: \"right flank pain and bladder spasms\"  HPI:  Ester Powell is a 30 y.o. female who is currently 15 weeks pregnant. She has RA, Crohns disease, and history of a 3mm non obstructing right renal stone on CT in . She developed sharp right sided flank pain a few days ago accompanied by bladder spasms, urinary frequency, and sensation of incomplete bladder emptying. She denies dysuria or hematuria. Admits to feeling chilled, possible fevers. Went to [facility] on  where UA appeared contaminated and urine cx revealed mixed skin/urogenital holly. She was prescribed keflex which she has been taking but pain continued without much improvement and thus is admitted for further evaluation. This admit her UA is negative (currently on Keflex), no leukocytosis, crt at baseline. Renal US reveals mild right hydronephrosis without apparent cause. Past Medical History:   Diagnosis Date    Abnormal Pap smear of cervix     Anemia     Anxiety     Crohn's colitis ([facility] [address]     Depression     Herniated disc     Hx of blood clots     ? ?  PE during pregnancy    SAB (spontaneous )     x2       Past Surgical History:   Procedure Laterality Date     SECTION      CHOLECYSTECTOMY, LAPAROSCOPIC N/A 2017    COLONOSCOPY N/A 2/3/2021    COLONOSCOPY WITH BIOPSY performed by Kamille Amado MD at [address]      ERCP         Medication List reviewed:      Current Facility-Administered Medications   Medication Dose Route Frequency Provider Last Rate Last Admin    escitalopram (LEXAPRO) tablet 10 mg  10 mg Oral Daily Hector Caldwell MD   10 mg at 22 0837    acetaminophen (TYLENOL) tablet 650 mg  650 mg Oral Q4H PRN Hector Caldwell MD   650 mg at 22 0936    lactated ringers infusion   IntraVENous Continuous Hector Caldwell  mL/hr at 12/13/22 0542 New Bag at 12/13/22 0542    promethazine (PHENERGAN) tablet 12.5 mg  12.5 mg Oral Q6H PRN Adriana Amaral MD   12.5 mg at 12/13/22 0936    cefTRIAXone (ROCEPHIN) 1,000 mg in dextrose 5 % 50 mL IVPB mini-bag  1,000 mg IntraVENous Q24H Adriana Amaral  mL/hr at 12/12/22 1345 1,000 mg at 12/12/22 1345    promethazine (PHENERGAN) suppository 25 mg  25 mg Rectal Q6H PRN Adriana Amaral MD        HYDROcodone-acetaminophen ([address]) 5-325 MG per tablet 1 tablet  1 tablet Oral Q6H PRN Adriana Amaral MD   1 tablet at 12/12/22 2311       Allergies   Allergen Reactions    Remicade [Infliximab] Swelling     Face and tongue swelling    Trintellix [Vortioxetine] Other (See Comments)     Severe mood change     Zofran [Ondansetron Hcl] Other (See Comments)     States has had without problems while on an antidepressant    Doxycycline Diarrhea    Reglan [Metoclopramide] Other (See Comments)     hallucinations       Family History   Problem Relation Age of Onset    Arthritis Father     High Blood Pressure Father     Crohn's Disease Father     Diabetes Paternal Aunt     Mental Retardation Paternal Aunt     Atrial Fibrillation Maternal Grandmother     High Blood Pressure Maternal Grandmother     Obesity Maternal Grandmother     Stroke Maternal Grandmother     Early Death Maternal Grandfather     Heart Disease Maternal Grandfather     High Blood Pressure Maternal Grandfather     Obesity Maternal Grandfather     Depression Paternal Grandmother     Heart Disease Paternal Grandmother     High Blood Pressure Paternal Grandmother     Kidney Disease Paternal Grandmother     Obesity Paternal Grandmother     Heart Disease Paternal Grandfather     High Blood Pressure Paternal Grandfather     No Known Problems Mother        Social History     Tobacco Use    Smoking status: Never    Smokeless tobacco: Never   Vaping Use    Vaping Use: Never used   Substance Use Topics    Alcohol use: No    Drug use: Yes     Types: Marijuana Jessica Renae) Review of Systems: A 12 point ROS was performed and was unremarkable unless listed in the history of present illness. No intake/output data recorded. Vitals:  BP (!) 110/53   Pulse 84   Temp 98.1 °F (36.7 °C) (Oral)   Resp 16   Ht 5' 7\" (1.702 m)   LMP 2022 (Exact Date)   SpO2 99%   BMI 50.37 kg/m²   Temp  Av.1 °F (36.7 °C)  Min: 97.8 °F (36.6 °C)  Max: 98.4 °F (36.9 °C)  No intake or output data in the 24 hours ending 22 1038      Physical:  Well developed, well nourished in no acute distress  Mood indicates no abnormalities. Pt doesnt appear depressed  Orientated to time and place  Abdomen is soft and not tender. Mild right CVA tenderness     Labs:  WBC:    Lab Results   Component Value Date/Time    WBC 9.0 2022 12:00 PM     Hemoglobin/Hematocrit:    Lab Results   Component Value Date/Time    HGB 13.0 2022 12:00 PM    HCT 38.7 2022 12:00 PM     BMP:    Lab Results   Component Value Date/Time     2022 12:00 PM    K 3.8 2022 12:00 PM    K 3.4 10/14/2022 10:45 PM     2022 12:00 PM    CO2 22 2022 12:00 PM    BUN 6 2022 12:00 PM    LABALBU 3.9 2022 12:00 PM    CREATININE <0.5 2022 12:00 PM    CALCIUM 9.1 2022 12:00 PM    GFRAA >60 10/14/2022 10:45 PM    GFRAA >60 2013 06:20 PM    LABGLOM >60 2022 12:00 PM     PT/INR:    Lab Results   Component Value Date/Time    PROTIME 11.0 2017 02:24 AM    INR 0.97 2017 02:24 AM     PTT:  No results found for: APTT[APTT    Urinalysis:    Latest Reference Range & Units 22 08:11 22 12:00   Color, UA Straw/Yellow  DARK YELLOW ! Yellow   Clarity, UA Clear  Clear Clear   Glucose, UA Negative mg/dL Negative Negative   Bilirubin, Urine Negative  Negative Negative   Ketones, Urine Negative mg/dL TRACE !  Negative   Specific Gravity, UA 1.005 - 1.030  1.032 1.020   Blood, Urine Negative  Negative Negative   pH, UA 5.0 - 8.0  5.5 7.5 Protein, UA Negative mg/dL 30 ! Negative   Urobilinogen, Urine <2.0 E.U./dL 0.2 0.2   Nitrite, Urine Negative  Negative Negative   Leukocyte Esterase, Urine Negative  TRACE ! Negative   Urine Type  Cleancatch NotGiven   Hyaline Casts, UA 0 - 8 /LPF 0    WBC, UA 0 - 5 /HPF 8 (H)    RBC, UA 0 - 4 /HPF 4    Epithelial Cells, UA 0 - 5 /HPF 34 (H)    Bacteria, UA None Seen /HPF 2+ ! Microscopic Examination  YES Not Indicated       Urine Culture:     12/9/22: >50,000 CFU/ml mixed skin/urogenital holly. No further workup     12/12/22: PENDING     Antibiotic Therapy:  Ceftriaxone     Imaging:     Renal US 12/12/22  1. Mild right hydronephrosis, without demonstrable cause. Given patient   history, consider a dedicated renal stone protocol CT study for further   characterization. 2. Unremarkable sonographic appearance of the left kidney. 3. Collapsed urinary bladder, limiting its evaluation. Impression/Plan:     Right flank pain   Mild right hydronephrosis   History of non obstructing 3 mm right renal stone   - Reassuring eval so far with negative urine cx on 12/9, no fever, no leucocytosis, normal kidney function, and UA negative now  - continue with IVF  - strain all urine   - pain control per OB  - will make NPO after midnight in the event urologic intervention necessary, no procedures planned today.   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

## 2022-12-14 LAB
A/G RATIO: 1.1 (ref 1.1–2.2)
ALBUMIN SERPL-MCNC: 3.1 G/DL (ref 3.4–5)
ALP BLD-CCNC: 45 U/L (ref 40–129)
ALT SERPL-CCNC: 8 U/L (ref 10–40)
ANION GAP SERPL CALCULATED.3IONS-SCNC: 10 MMOL/L (ref 3–16)
AST SERPL-CCNC: 9 U/L (ref 15–37)
BASOPHILS ABSOLUTE: 0 K/UL (ref 0–0.2)
BASOPHILS RELATIVE PERCENT: 0.5 %
BILIRUB SERPL-MCNC: <0.2 MG/DL (ref 0–1)
BUN BLDV-MCNC: 5 MG/DL (ref 7–20)
CALCIUM SERPL-MCNC: 8.7 MG/DL (ref 8.3–10.6)
CHLORIDE BLD-SCNC: 104 MMOL/L (ref 99–110)
CO2: 23 MMOL/L (ref 21–32)
CREAT SERPL-MCNC: <0.5 MG/DL (ref 0.6–1.1)
EOSINOPHILS ABSOLUTE: 0.3 K/UL (ref 0–0.6)
EOSINOPHILS RELATIVE PERCENT: 3.6 %
GFR SERPL CREATININE-BSD FRML MDRD: >60 ML/MIN/{1.73_M2}
GLUCOSE BLD-MCNC: 99 MG/DL (ref 70–99)
HCT VFR BLD CALC: 35.1 % (ref 36–48)
HEMOGLOBIN: 11.6 G/DL (ref 12–16)
LYMPHOCYTES ABSOLUTE: 2.2 K/UL (ref 1–5.1)
LYMPHOCYTES RELATIVE PERCENT: 26.4 %
MCH RBC QN AUTO: 28 PG (ref 26–34)
MCHC RBC AUTO-ENTMCNC: 33.1 G/DL (ref 31–36)
MCV RBC AUTO: 84.5 FL (ref 80–100)
MONOCYTES ABSOLUTE: 0.3 K/UL (ref 0–1.3)
MONOCYTES RELATIVE PERCENT: 3.9 %
NEUTROPHILS ABSOLUTE: 5.5 K/UL (ref 1.7–7.7)
NEUTROPHILS RELATIVE PERCENT: 65.6 %
PDW BLD-RTO: 14.3 % (ref 12.4–15.4)
PLATELET # BLD: 162 K/UL (ref 135–450)
PMV BLD AUTO: 7.1 FL (ref 5–10.5)
POTASSIUM SERPL-SCNC: 3.7 MMOL/L (ref 3.5–5.1)
RBC # BLD: 4.15 M/UL (ref 4–5.2)
SODIUM BLD-SCNC: 137 MMOL/L (ref 136–145)
TOTAL PROTEIN: 6 G/DL (ref 6.4–8.2)
URINE CULTURE, ROUTINE: NORMAL
WBC # BLD: 8.4 K/UL (ref 4–11)

## 2022-12-14 PROCEDURE — 80053 COMPREHEN METABOLIC PANEL: CPT

## 2022-12-14 PROCEDURE — 1220000000 HC SEMI PRIVATE OB R&B

## 2022-12-14 PROCEDURE — 6360000002 HC RX W HCPCS: Performed by: OBSTETRICS & GYNECOLOGY

## 2022-12-14 PROCEDURE — 6370000000 HC RX 637 (ALT 250 FOR IP): Performed by: OBSTETRICS & GYNECOLOGY

## 2022-12-14 PROCEDURE — 2580000003 HC RX 258: Performed by: OBSTETRICS & GYNECOLOGY

## 2022-12-14 PROCEDURE — 99231 SBSQ HOSP IP/OBS SF/LOW 25: CPT | Performed by: OBSTETRICS & GYNECOLOGY

## 2022-12-14 PROCEDURE — 36415 COLL VENOUS BLD VENIPUNCTURE: CPT

## 2022-12-14 PROCEDURE — 85025 COMPLETE CBC W/AUTO DIFF WBC: CPT

## 2022-12-14 RX ADMIN — CEFTRIAXONE SODIUM 1000 MG: 1 INJECTION, POWDER, FOR SOLUTION INTRAMUSCULAR; INTRAVENOUS at 12:40

## 2022-12-14 RX ADMIN — SODIUM CHLORIDE, POTASSIUM CHLORIDE, SODIUM LACTATE AND CALCIUM CHLORIDE: 600; 310; 30; 20 INJECTION, SOLUTION INTRAVENOUS at 15:01

## 2022-12-14 RX ADMIN — HYDROCODONE BITARTRATE AND ACETAMINOPHEN 1 TABLET: 5; 325 TABLET ORAL at 21:14

## 2022-12-14 RX ADMIN — ESCITALOPRAM OXALATE 10 MG: 10 TABLET ORAL at 09:03

## 2022-12-14 RX ADMIN — PROMETHAZINE HYDROCHLORIDE 12.5 MG: 25 TABLET ORAL at 09:40

## 2022-12-14 RX ADMIN — ACETAMINOPHEN 650 MG: 325 TABLET ORAL at 09:39

## 2022-12-14 RX ADMIN — SODIUM CHLORIDE, POTASSIUM CHLORIDE, SODIUM LACTATE AND CALCIUM CHLORIDE: 600; 310; 30; 20 INJECTION, SOLUTION INTRAVENOUS at 06:11

## 2022-12-14 ASSESSMENT — PAIN DESCRIPTION - ORIENTATION: ORIENTATION: LOWER

## 2022-12-14 ASSESSMENT — PAIN DESCRIPTION - DESCRIPTORS: DESCRIPTORS: ACHING

## 2022-12-14 ASSESSMENT — PAIN SCALES - GENERAL
PAINLEVEL_OUTOF10: 6
PAINLEVEL_OUTOF10: 4

## 2022-12-14 ASSESSMENT — PAIN - FUNCTIONAL ASSESSMENT: PAIN_FUNCTIONAL_ASSESSMENT: ACTIVITIES ARE NOT PREVENTED

## 2022-12-14 ASSESSMENT — PAIN DESCRIPTION - LOCATION: LOCATION: BACK

## 2022-12-14 NOTE — PROGRESS NOTES
Urology Progress Note      Subjective: Lilly had right flank pain and nausea last night. Nausea has been ongoing throughout pregnancy, not new     Vitals:  BP (!) 119/56   Pulse 84   Temp 98.1 °F (36.7 °C) (Oral)   Resp 16   Ht 5' 7\" (1.702 m)   LMP 2022 (Exact Date)   SpO2 98%   BMI 50.37 kg/m²   Temp  Av.2 °F (36.8 °C)  Min: 97.7 °F (36.5 °C)  Max: 98.7 °F (37.1 °C)    Intake/Output Summary (Last 24 hours) at 2022 1004  Last data filed at 2022 0606  Gross per 24 hour   Intake 2000 ml   Output 1650 ml   Net 350 ml       Exam:   Physical:  Well developed, well nourished in no acute distress  Mood indicates no abnormalities. Pt doesnt appear depressed  Orientated to time and place    Labs:  WBC:    Lab Results   Component Value Date/Time    WBC 8.4 2022 06:35 AM     Hemoglobin/Hematocrit:    Lab Results   Component Value Date/Time    HGB 11.6 2022 06:35 AM    HCT 35.1 2022 06:35 AM     BMP:    Lab Results   Component Value Date/Time     2022 06:35 AM    K 3.7 2022 06:35 AM    K 3.4 10/14/2022 10:45 PM     2022 06:35 AM    CO2 23 2022 06:35 AM    BUN 5 2022 06:35 AM    LABALBU 3.1 2022 06:35 AM    CREATININE <0.5 2022 06:35 AM    CALCIUM 8.7 2022 06:35 AM    GFRAA >60 10/14/2022 10:45 PM    GFRAA >60 2013 06:20 PM    LABGLOM >60 2022 06:35 AM     PT/INR:    Lab Results   Component Value Date/Time    PROTIME 11.0 2017 02:24 AM    INR 0.97 2017 02:24 AM     PTT:  No results found for: APTT[APTT    Urinalysis:     Latest Reference Range & Units 22 08:11 22 12:00   Color, UA Straw/Yellow  DARK YELLOW ! Yellow   Clarity, UA Clear  Clear Clear   Glucose, UA Negative mg/dL Negative Negative   Bilirubin, Urine Negative  Negative Negative   Ketones, Urine Negative mg/dL TRACE !  Negative   Specific Gravity, UA 1.005 - 1.030  1.032 1.020   Blood, Urine Negative Negative Negative   pH, UA 5.0 - 8.0  5.5 7.5   Protein, UA Negative mg/dL 30 ! Negative   Urobilinogen, Urine <2.0 E.U./dL 0.2 0.2   Nitrite, Urine Negative  Negative Negative   Leukocyte Esterase, Urine Negative  TRACE ! Negative   Urine Type   Cleancatch NotGiven   Hyaline Casts, UA 0 - 8 /LPF 0     WBC, UA 0 - 5 /HPF 8 (H)     RBC, UA 0 - 4 /HPF 4     Epithelial Cells, UA 0 - 5 /HPF 34 (H)     Bacteria, UA None Seen /HPF 2+ ! Microscopic Examination   YES Not Indicated         Urine Culture:      12/9/22: >50,000 CFU/ml mixed skin/urogenital holly. No further workup      12/12/22: No growth      Antibiotic Therapy:  Ceftriaxone      Imaging:     Renal US 12/12/22  1. Mild right hydronephrosis, without demonstrable cause. Given patient   history, consider a dedicated renal stone protocol CT study for further   characterization. 2. Unremarkable sonographic appearance of the left kidney. 3. Collapsed urinary bladder, limiting its evaluation. Impression/Plan:      Right flank pain   Mild right hydronephrosis   History of non obstructing 3 mm right renal stone   - Reassuring eval so far with negative urine cx on 12/9 and 12/12, no fever, no leucocytosis, normal kidney function   - continue with IVF  - strain all urine   - pain control per OB  - Long discussion with patient today. Will further evaluate with MRI abd and pelvis.   No plans for OR, patient can eat     Maddi Newell PA-C  [facility]

## 2022-12-14 NOTE — PROGRESS NOTES
[facility] Obstetrics and Gynecology  Antepartum Progress Note    Subjective:  Hannah Lu is a 30 y.o. [id number] at 15w5d admitted for flank pain and urinary hesitancy with incomplete bladder emptying which started Friday, 12/9/2022. Feels like pain has somewhat improved overnight, still having it but not as bad. Continued ongoing nausea/vomiting which is not new for her pregnancy. Otherwise no changes this AM.    Review of Systems - The following ROS was otherwise negative, except as noted in the HPI: constitutional, respiratory, cardiovascular, gastrointestinal, genitourinary    Objective:  BP (!) 119/56   Pulse 84   Temp 98.1 °F (36.7 °C) (Oral)   Resp 16   Ht 5' 7\" (1.702 m)   LMP 08/26/2022 (Exact Date)   SpO2 98%   BMI 50.37 kg/m²       Physical Exam  Constitutional:       Appearance: Normal appearance. HENT:      Head: Normocephalic and atraumatic. Right Ear: External ear normal.      Left Ear: External ear normal.   Eyes:      Conjunctiva/sclera: Conjunctivae normal.      Pupils: Pupils are equal, round, and reactive to light. Cardiovascular:      Rate and Rhythm: Normal rate and regular rhythm. Pulmonary:      Effort: Pulmonary effort is normal.      Breath sounds: Normal breath sounds. No wheezing or rales. Abdominal:      General: Abdomen is protuberant. Palpations: Abdomen is soft. Tenderness: There is right CVA tenderness. There is no left CVA tenderness or rebound. Neurological:      Mental Status: She is alert. Vitals reviewed.         Fetal Heart Tones:   FHT: 160s in office yesterday before arrival    Labs:  Lab Results   Component Value Date/Time    COLORU Yellow 12/12/2022 12:00 PM    NITRU Negative 12/12/2022 12:00 PM    GLUCOSEU Negative 12/12/2022 12:00 PM    KETUA Negative 12/12/2022 12:00 PM    UROBILINOGEN 0.2 12/12/2022 12:00 PM    BILIRUBINUR Negative 12/12/2022 12:00 PM    BILIRUBINUR neg 04/14/2013 07:19 PM     Lab Results   Component Value Date    WBC 8.4 12/14/2022    HGB 11.6 (L) 12/14/2022    HCT 35.1 (L) 12/14/2022    MCV 84.5 12/14/2022     12/14/2022     Lab Results   Component Value Date     12/14/2022    K 3.7 12/14/2022     12/14/2022    CO2 23 12/14/2022    BUN 5 (L) 12/14/2022    CREATININE <0.5 (L) 12/14/2022    GLUCOSE 99 12/14/2022    CALCIUM 8.7 12/14/2022    PROT 6.0 (L) 12/14/2022    LABALBU 3.1 (L) 12/14/2022    BILITOT <0.2 12/14/2022    ALKPHOS 45 12/14/2022    AST 9 (L) 12/14/2022    ALT 8 (L) 12/14/2022    LABGLOM >60 12/14/2022    GFRAA >60 10/14/2022    AGRATIO 1.1 12/14/2022    GLOB 3.3 03/30/2021     US Result (most recent):  US RENAL COMPLETE 12/12/2022    Narrative  EXAMINATION:  RETROPERITONEAL ULTRASOUND OF THE KIDNEYS AND URINARY BLADDER    12/12/2022    COMPARISON:  CT abdomen/pelvis 12/29/2020    HISTORY:  ORDERING SYSTEM PROVIDED HISTORY: right flank pain  TECHNOLOGIST PROVIDED HISTORY:    Reason for exam:->right flank pain    FINDINGS:    Kidneys: The right kidney measures 13.7 x 6.4 x 6.8 cm in size and the left kidney  measures 11.1 x 5.4 x 6.3 cm in size. Kidneys demonstrate normal cortical echogenicity. There is mild right  hydronephrosis without demonstrable cause. There is no evidence of left  hydronephrosis. No definite renal calculi are identified. No solid renal  mass is seen. Bladder: The urinary bladder is collapsed, limiting its evaluation. Impression  1. Mild right hydronephrosis, without demonstrable cause. Given patient  history, consider a dedicated renal stone protocol CT study for further  characterization. 2. Unremarkable sonographic appearance of the left kidney. 3. Collapsed urinary bladder, limiting its evaluation. Assessment/Plan:  30 y.o. [id number] at 15w5d (Estimated Date of Delivery: 6/2/23) admitted for flank pain and urinary hesitancy with incomplete bladder emptying which started Friday 12/2/2022.     1. Fetal Well Being   -reassuring, continue daily FHT    2.  Right flank pain   - Ddx: UTI, cystitis, retroverted uterus, nephrolithiasis  - UA negative, Urine culture negative  - cervical length wnl on TVUS in office 12/12  - continue empiric Rocephin 1 gm q24hr  - continue IV hydration  ml/hr  - PRN pain and nausea medications  - daily labs: CBC/CMP  - monitor I&Os  - urology consulted -- appreciate recs, MRI ordered today for possible stone as renal US was unable to fully characterize    Bean Ventura MD

## 2022-12-15 ENCOUNTER — APPOINTMENT (OUTPATIENT)
Dept: MRI IMAGING | Age: 37
DRG: 832 | End: 2022-12-15
Payer: COMMERCIAL

## 2022-12-15 VITALS
RESPIRATION RATE: 16 BRPM | SYSTOLIC BLOOD PRESSURE: 110 MMHG | OXYGEN SATURATION: 100 % | DIASTOLIC BLOOD PRESSURE: 67 MMHG | HEIGHT: 67 IN | TEMPERATURE: 97.7 F | BODY MASS INDEX: 50.37 KG/M2 | HEART RATE: 81 BPM

## 2022-12-15 LAB
A/G RATIO: 1.4 (ref 1.1–2.2)
ALBUMIN SERPL-MCNC: 3.4 G/DL (ref 3.4–5)
ALP BLD-CCNC: 51 U/L (ref 40–129)
ALT SERPL-CCNC: 8 U/L (ref 10–40)
ANION GAP SERPL CALCULATED.3IONS-SCNC: 10 MMOL/L (ref 3–16)
AST SERPL-CCNC: 11 U/L (ref 15–37)
BASOPHILS ABSOLUTE: 0 K/UL (ref 0–0.2)
BASOPHILS RELATIVE PERCENT: 0.2 %
BILIRUB SERPL-MCNC: <0.2 MG/DL (ref 0–1)
BUN BLDV-MCNC: 4 MG/DL (ref 7–20)
CALCIUM SERPL-MCNC: 8.6 MG/DL (ref 8.3–10.6)
CHLORIDE BLD-SCNC: 104 MMOL/L (ref 99–110)
CO2: 23 MMOL/L (ref 21–32)
CREAT SERPL-MCNC: <0.5 MG/DL (ref 0.6–1.1)
EOSINOPHILS ABSOLUTE: 0.3 K/UL (ref 0–0.6)
EOSINOPHILS RELATIVE PERCENT: 3.2 %
GFR SERPL CREATININE-BSD FRML MDRD: >60 ML/MIN/{1.73_M2}
GLUCOSE BLD-MCNC: 95 MG/DL (ref 70–99)
HCT VFR BLD CALC: 36 % (ref 36–48)
HEMOGLOBIN: 12.3 G/DL (ref 12–16)
LYMPHOCYTES ABSOLUTE: 2.2 K/UL (ref 1–5.1)
LYMPHOCYTES RELATIVE PERCENT: 23.4 %
MCH RBC QN AUTO: 28.8 PG (ref 26–34)
MCHC RBC AUTO-ENTMCNC: 34.1 G/DL (ref 31–36)
MCV RBC AUTO: 84.3 FL (ref 80–100)
MONOCYTES ABSOLUTE: 0.4 K/UL (ref 0–1.3)
MONOCYTES RELATIVE PERCENT: 3.8 %
NEUTROPHILS ABSOLUTE: 6.4 K/UL (ref 1.7–7.7)
NEUTROPHILS RELATIVE PERCENT: 69.4 %
PDW BLD-RTO: 14.4 % (ref 12.4–15.4)
PLATELET # BLD: 171 K/UL (ref 135–450)
PMV BLD AUTO: 7.1 FL (ref 5–10.5)
POTASSIUM SERPL-SCNC: 3.8 MMOL/L (ref 3.5–5.1)
RBC # BLD: 4.27 M/UL (ref 4–5.2)
SODIUM BLD-SCNC: 137 MMOL/L (ref 136–145)
TOTAL PROTEIN: 5.8 G/DL (ref 6.4–8.2)
WBC # BLD: 9.3 K/UL (ref 4–11)

## 2022-12-15 PROCEDURE — 36415 COLL VENOUS BLD VENIPUNCTURE: CPT

## 2022-12-15 PROCEDURE — 80053 COMPREHEN METABOLIC PANEL: CPT

## 2022-12-15 PROCEDURE — 99238 HOSP IP/OBS DSCHRG MGMT 30/<: CPT | Performed by: OBSTETRICS & GYNECOLOGY

## 2022-12-15 PROCEDURE — 6370000000 HC RX 637 (ALT 250 FOR IP): Performed by: OBSTETRICS & GYNECOLOGY

## 2022-12-15 PROCEDURE — 74181 MRI ABDOMEN W/O CONTRAST: CPT

## 2022-12-15 PROCEDURE — 85025 COMPLETE CBC W/AUTO DIFF WBC: CPT

## 2022-12-15 RX ORDER — HYDROCODONE BITARTRATE AND ACETAMINOPHEN 5; 325 MG/1; MG/1
1 TABLET ORAL EVERY 6 HOURS PRN
Qty: 28 TABLET | Refills: 0 | Status: SHIPPED | OUTPATIENT
Start: 2022-12-15 | End: 2022-12-22

## 2022-12-15 RX ADMIN — HYDROCODONE BITARTRATE AND ACETAMINOPHEN 1 TABLET: 5; 325 TABLET ORAL at 10:10

## 2022-12-15 RX ADMIN — ESCITALOPRAM OXALATE 10 MG: 10 TABLET ORAL at 10:06

## 2022-12-15 ASSESSMENT — PAIN DESCRIPTION - ORIENTATION: ORIENTATION: RIGHT

## 2022-12-15 ASSESSMENT — PAIN DESCRIPTION - DESCRIPTORS: DESCRIPTORS: STABBING

## 2022-12-15 ASSESSMENT — PAIN - FUNCTIONAL ASSESSMENT: PAIN_FUNCTIONAL_ASSESSMENT: ACTIVITIES ARE NOT PREVENTED

## 2022-12-15 ASSESSMENT — PAIN SCALES - GENERAL: PAINLEVEL_OUTOF10: 6

## 2022-12-15 ASSESSMENT — PAIN DESCRIPTION - LOCATION: LOCATION: FLANK

## 2022-12-15 NOTE — PROGRESS NOTES
Urology Progress Note      Subjective: Marek Pipes feels better, pain improved. MRI ordered    Vitals:  /77   Pulse 90   Temp 98.4 °F (36.9 °C) (Oral)   Resp 16   Ht 5' 7\" (1.702 m)   LMP 2022 (Exact Date)   SpO2 100%   BMI 50.37 kg/m²   Temp  Av.3 °F (36.8 °C)  Min: 98.2 °F (36.8 °C)  Max: 98.4 °F (36.9 °C)    Intake/Output Summary (Last 24 hours) at 12/15/2022 0906  Last data filed at 12/15/2022 0525  Gross per 24 hour   Intake 50 ml   Output 1200 ml   Net -1150 ml         Exam:   Physical:  Well developed, well nourished in no acute distress  Mood indicates no abnormalities. Pt doesnt appear depressed  Orientated to time and place    Labs:  WBC:    Lab Results   Component Value Date/Time    WBC 9.3 12/15/2022 06:32 AM     Hemoglobin/Hematocrit:    Lab Results   Component Value Date/Time    HGB 12.3 12/15/2022 06:32 AM    HCT 36.0 12/15/2022 06:32 AM     BMP:    Lab Results   Component Value Date/Time     12/15/2022 06:32 AM    K 3.8 12/15/2022 06:32 AM    K 3.4 10/14/2022 10:45 PM     12/15/2022 06:32 AM    CO2 23 12/15/2022 06:32 AM    BUN 4 12/15/2022 06:32 AM    LABALBU 3.4 12/15/2022 06:32 AM    CREATININE <0.5 12/15/2022 06:32 AM    CALCIUM 8.6 12/15/2022 06:32 AM    GFRAA >60 10/14/2022 10:45 PM    GFRAA >60 2013 06:20 PM    LABGLOM >60 12/15/2022 06:32 AM     PT/INR:    Lab Results   Component Value Date/Time    PROTIME 11.0 2017 02:24 AM    INR 0.97 2017 02:24 AM     PTT:  No results found for: APTT[APTT    Urinalysis:     Latest Reference Range & Units 22 08:11 22 12:00   Color, UA Straw/Yellow  DARK YELLOW ! Yellow   Clarity, UA Clear  Clear Clear   Glucose, UA Negative mg/dL Negative Negative   Bilirubin, Urine Negative  Negative Negative   Ketones, Urine Negative mg/dL TRACE !  Negative   Specific Gravity, UA 1.005 - 1.030  1.032 1.020   Blood, Urine Negative  Negative Negative   pH, UA 5.0 - 8.0  5.5 7.5   Protein, UA Negative mg/dL 30 ! Negative   Urobilinogen, Urine <2.0 E.U./dL 0.2 0.2   Nitrite, Urine Negative  Negative Negative   Leukocyte Esterase, Urine Negative  TRACE ! Negative   Urine Type   Cleancatch NotGiven   Hyaline Casts, UA 0 - 8 /LPF 0     WBC, UA 0 - 5 /HPF 8 (H)     RBC, UA 0 - 4 /HPF 4     Epithelial Cells, UA 0 - 5 /HPF 34 (H)     Bacteria, UA None Seen /HPF 2+ ! Microscopic Examination   YES Not Indicated         Urine Culture:      12/9/22: >50,000 CFU/ml mixed skin/urogenital holly. No further workup      12/12/22: No growth      Antibiotic Therapy:  Ceftriaxone      Imaging:     Renal US 12/12/22  1. Mild right hydronephrosis, without demonstrable cause. Given patient   history, consider a dedicated renal stone protocol CT study for further   characterization. 2. Unremarkable sonographic appearance of the left kidney. 3. Collapsed urinary bladder, limiting its evaluation. Impression/Plan:      Right flank pain   Mild right hydronephrosis   History of non obstructing 3 mm right renal stone   - Reassuring eval so far with negative urine cx on 12/9 and 12/12, no fever, no leucocytosis, normal kidney function   - strain all urine   - pain control per OB  - MRI is ordered, will await results.   It is reassuring that her pain has significantly improved    Nicole Michaud PA-C  [facility]

## 2022-12-15 NOTE — DISCHARGE SUMMARY
[facility] Ob/Gyn  Obstetric Discharge Summary        Admitting Diagnosis:   1. Arian Kang is a 30 y.o. [id number] at 15w3d  2. Right flank pain    Discharge Diagnosis:  1. Arian Kang is a 30 y.o. [id number] at 15w6d  2. Right flank pain  3. Hydronephrosis  4. Right adnexal lesion  5. Maternal obesity     Procedures:   None    Referrals:    - Urology     Information:   Arian Kang is a 30 y.o. [id number] at 15w3d who was admitted for IV antibiotics and pain control related to right flank pain. Patient began to note some improvement. MRI performed 12/15/22 was within normal limit. Patient followed with Urology throughout admission with note stable for discharge. For complete visit details please see EMR. Discharge Instructions:  Please call for increased pain not controlled by pain medications, vaginal bleeding, temperature greater than 101 degrees F or any other concerns. Plan to follow up in 1 weeks.       Diet:common adult    Activity:  Activity as tolerated     Medications:   - Norco    Disposition: Home    Condition on discharge: Stable    Follow up: in 1 week(s)    Rachel Yusuf DO

## 2022-12-15 NOTE — PROGRESS NOTES
D/c instructions given. Pt verbalized understanding and denied questions. Pt left OB unit @[id number] stable condition, ambulatory and undelivered. Not in active labor.

## 2022-12-15 NOTE — FLOWSHEET NOTE
Pt awakened for vital signs. Pt states after taking Norco last night her pain has been much better and remained around a 1-2/10. Urine hat emptied for 700mls and strained with no results. Pt left resting in bed comfortably with lights out and encouraged to call RN for any wants/needs. Pt verbalizes understanding.

## 2022-12-15 NOTE — PROGRESS NOTES
[facility] Obstetrics and Gynecology  Antepartum Progress Note      Subjective:  Cristian Thomas is a 30 y.o. [id number] at 15w6d admitted for flank pain and urinary hesitancy with incomplete bladder emptying which started Friday, 12/9/2022. Feels pain continues to improve. Was able to get a good nights sleep, however since she has been up and out of bed she has noticed mild increase in pain. Tolerating regular diet without worsening nausea or vomiting. Denies chest pain, shortness of breath, fever, chills. Denies headache, vision changes, RUQ pain, increased LE edema. Denies vaginal bleeding or pelvic pain. Has had MRI this morning and per Urology okay for discharge and outpatient management.      Review of Systems - The following ROS was otherwise negative, except as noted in the HPI: constitutional, respiratory, cardiovascular, gastrointestinal, genitourinary    Objective:  /67   Pulse 81   Temp 97.7 °F (36.5 °C) (Oral)   Resp 16   Ht 5' 7\" (1.702 m)   LMP 08/26/2022 (Exact Date)   SpO2 100%   BMI 50.37 kg/m²     General: Alert, well appearing, no acute distress  Heart: regular rate, skin well perfused  Lungs: Unlabored respirations  Abdomen: Gravid, soft, non-tender to palpation   Extremities: No redness or tenderness or edema   Musculoskeletal: Positive right CVA tenderness, improved from prior    Fetal heart rate:  159 bpm     Labs:   Lab Results   Component Value Date    WBC 9.3 12/15/2022    HGB 12.3 12/15/2022    HCT 36.0 12/15/2022    MCV 84.3 12/15/2022     12/15/2022      Lab Results   Component Value Date     12/15/2022    K 3.8 12/15/2022     12/15/2022    CO2 23 12/15/2022    BUN 4 (L) 12/15/2022    CREATININE <0.5 (L) 12/15/2022    GLUCOSE 95 12/15/2022    CALCIUM 8.6 12/15/2022    PROT 5.8 (L) 12/15/2022    LABALBU 3.4 12/15/2022    BILITOT <0.2 12/15/2022    ALKPHOS 51 12/15/2022    AST 11 (L) 12/15/2022    ALT 8 (L) 12/15/2022    LABGLOM >60 12/15/2022    GFRAA >60 10/14/2022    AGRATIO 1.4 12/15/2022    GLOB 3.3 03/30/2021     Urine culture:   Component 12/12/22 1434    Urine Culture, Routine No growth at 18 to 36 hours    Resulting Agency [address] [facility]       Imaging:    US RENAL COMPLETE 12/12/2022     Narrative  EXAMINATION:  RETROPERITONEAL ULTRASOUND OF THE KIDNEYS AND URINARY BLADDER     12/12/2022     COMPARISON:  CT abdomen/pelvis 12/29/2020     HISTORY:  ORDERING SYSTEM PROVIDED HISTORY: right flank pain  TECHNOLOGIST PROVIDED HISTORY:     Reason for exam:->right flank pain     FINDINGS:     Kidneys: The right kidney measures 13.7 x 6.4 x 6.8 cm in size and the left kidney  measures 11.1 x 5.4 x 6.3 cm in size. Kidneys demonstrate normal cortical echogenicity. There is mild right  hydronephrosis without demonstrable cause. There is no evidence of left  hydronephrosis. No definite renal calculi are identified. No solid renal  mass is seen. Bladder: The urinary bladder is collapsed, limiting its evaluation. Impression  1. Mild right hydronephrosis, without demonstrable cause. Given patient  history, consider a dedicated renal stone protocol CT study for further  characterization. 2. Unremarkable sonographic appearance of the left kidney. 3. Collapsed urinary bladder, limiting its evaluation. MRI:  Narrative   EXAMINATION:   MRI OF THE ABDOMEN WITHOUT CONTRAST; MRI OF THE PELVIS WITHOUT CONTRAST,   12/15/2022 9:13 am       TECHNIQUE:   Multiplanar multisequence MRI of the abdomen was performed without the   administration of intravenous contrast.; Multiplanar multisequence MRI of the   pelvis was performed without the administration of intravenous contrast.       COMPARISON:   CT scan 12/29/2020       Recent ultrasound       HISTORY:   ORDERING SYSTEM PROVIDED HISTORY: right hydronephrosis, evaluate for   obstructing stone.   please image down through the pelvis   TECHNOLOGIST PROVIDED HISTORY:   Reason for exam:->right hydronephrosis, evaluate for obstructing stone. please image down through the pelvis   What is the sedation requirement?->None   Reason for Exam: right hydronephrosis, evaluate for obstructing stone       FINDINGS:   Motion artifact degrades the study. This decreases sensitivity and   specificity. Inferior portion of the liver appears unremarkable. No perisplenic fluid       No peripancreatic fluid. Adrenal glands are partially imaged but appear normal.       No hydronephrosis on the left. There is mild right-sided hydronephrosis   noted. No significant small bowel distention noted. Moderate stool seen in the   colon. Distal right ureter is likely compressed by the patient's gravid uterus. No obvious ureteral filling defects seen on this motion limited study. Small amount of free fluid seen within the pelvis. A lobular cystic nodule seen in the pelvis on the right measuring 2.0 cm x   2.0 cm. This is unchanged compared to prior CT from 2020       No acute bony abnormality. Impression   Mild hydronephrosis is seen, possibly due to hydronephrosis of pregnancy from   compression of the distal right ureter from the gravid uterus. Recommend   correlation with urinalysis. No obvious ureteral filling defects seen on this   motion limited study. Lobular cystic nodule is seen posteriorly in the pelvis on the right,   unchanged from prior CT scan 12/29/2020       RECOMMENDATIONS:       Assessment/Plan:  30 y.o. [id number] at 15w6d (Estimated Date of Delivery: 6/2/23)    1.  Right Flank Pain     - Ddx: UTI, cystitis, retroverted uterus, nephrolithiasis     - UA negative, urine culture negative     - Cervical length wnl on TVUS in office 12/12     - Is having improvement s/p Rocephin 1g q24hrs     - Despite negative urine culture will complete course of PO antibiotics secondary to positive UTI in first trimester     - Continue PO hydration     - Continue prn pain medication     - Labs are stable      - Urology consulted --> MRI without evidence of stone, stable for discharge per urology with outpatient follow-up, appreciate recommendations    2. Hydronephrosis     - See above    3. Right adnexal lesion     - Lobular cystic nodule seen in the pelvis on the right measuring 2.0 x 2.0 cm which is unchanged from 2020     - Due to location unlikely to be cause of current pain     - Will follow-up postpartum    4. Maternal Obesity     - Early 1 hr glucose screen performed while inpatient and is within normal limits    5.  Fetal wellbeing     - Reassuring FHR today     Disposition:   Stable for discharge home  Discharge instructions reviewed   Return precautions reviewed   Rx for Norco  Has Keflex at home - complete course  Has Zofran and Phenergan supp at home  Follow-up in 1-2 weeks for SARINA visit    Marnie Goldman DO

## 2022-12-15 NOTE — FLOWSHEET NOTE
Dr. Giancarlo Gaviria notified that pt's IV infiltrated. States pt does not need another as long as pt PO hydrates.

## 2022-12-15 NOTE — PLAN OF CARE
MRI reviewed: mild right hydronephrosis may be related to pregnancy. No apparent stone. OK for patient to be dc'd home from urology standpoint.   Defer to OBGYN follow up for cystic nodule in the right pelvis    Maurice Wright PA-C  [facility]

## 2022-12-16 ENCOUNTER — TELEPHONE (OUTPATIENT)
Dept: OBGYN CLINIC | Age: 37
End: 2022-12-16

## 2022-12-19 ENCOUNTER — ROUTINE PRENATAL (OUTPATIENT)
Dept: OBGYN CLINIC | Age: 37
End: 2022-12-19
Payer: COMMERCIAL

## 2022-12-19 VITALS
WEIGHT: 293 LBS | SYSTOLIC BLOOD PRESSURE: 124 MMHG | HEART RATE: 101 BPM | BODY MASS INDEX: 50.81 KG/M2 | DIASTOLIC BLOOD PRESSURE: 84 MMHG | TEMPERATURE: 97.8 F

## 2022-12-19 DIAGNOSIS — O23.41 URINARY TRACT INFECTION IN PREGNANCY, ANTEPARTUM, FIRST TRIMESTER: ICD-10-CM

## 2022-12-19 DIAGNOSIS — O99.210 MATERNAL OBESITY AFFECTING PREGNANCY, ANTEPARTUM: ICD-10-CM

## 2022-12-19 DIAGNOSIS — Z36.9 ANTENATAL SCREENING ENCOUNTER: ICD-10-CM

## 2022-12-19 DIAGNOSIS — O21.9 NAUSEA AND VOMITING IN PREGNANCY: ICD-10-CM

## 2022-12-19 DIAGNOSIS — Z34.82 PRENATAL CARE, SUBSEQUENT PREGNANCY IN SECOND TRIMESTER: Primary | ICD-10-CM

## 2022-12-19 DIAGNOSIS — Z87.59 HISTORY OF ECTOPIC PREGNANCY: ICD-10-CM

## 2022-12-19 DIAGNOSIS — Z86.711 HISTORY OF PULMONARY EMBOLUS (PE): ICD-10-CM

## 2022-12-19 DIAGNOSIS — O09.522 MULTIGRAVIDA OF ADVANCED MATERNAL AGE IN SECOND TRIMESTER: ICD-10-CM

## 2022-12-19 DIAGNOSIS — F41.9 ANXIETY DISORDER, UNSPECIFIED TYPE: ICD-10-CM

## 2022-12-19 DIAGNOSIS — Z98.891 HISTORY OF CESAREAN DELIVERY: ICD-10-CM

## 2022-12-19 DIAGNOSIS — K50.119 CROHN'S DISEASE OF COLON WITH COMPLICATION (HCC): ICD-10-CM

## 2022-12-19 DIAGNOSIS — Z86.32 HISTORY OF GESTATIONAL DIABETES: ICD-10-CM

## 2022-12-19 PROCEDURE — 0502F SUBSEQUENT PRENATAL CARE: CPT | Performed by: OBSTETRICS & GYNECOLOGY

## 2022-12-19 PROCEDURE — 36415 COLL VENOUS BLD VENIPUNCTURE: CPT | Performed by: OBSTETRICS & GYNECOLOGY

## 2022-12-19 RX ORDER — PROMETHAZINE HYDROCHLORIDE 12.5 MG/1
12.5 TABLET ORAL 4 TIMES DAILY PRN
Qty: 20 TABLET | Refills: 0 | Status: SHIPPED | OUTPATIENT
Start: 2022-12-19 | End: 2022-12-26

## 2022-12-19 RX ORDER — PROMETHAZINE HYDROCHLORIDE 25 MG/1
25 SUPPOSITORY RECTAL EVERY 6 HOURS PRN
Qty: 10 SUPPOSITORY | Refills: 1 | Status: SHIPPED | OUTPATIENT
Start: 2022-12-19

## 2022-12-19 NOTE — PROGRESS NOTES
Behavior normal.         Thought Content: Thought content normal.       FHR: 157 bpm by doppler    Assessment/Plan:   Cristian Thomas is a 30 y.o. [id number] at 16w3d who presents for routine OB visit    1. Prenatal care, subsequent pregnancy in second trimester     - Patient is doing well today      - Fetal wellbeing reassuring by FHR today     - Continue PNV     - PNL: A positive/ab neg, R Immune, Varicella immune, HepB non-reactive, HIV non-reactive, HepC non-reactive, Syphilis non-reactive, TSH 1.72, Hgb 13.1, Plt 224, UDS neg, UCx E. faecalis, GCCT neg/neg, Pap NILM, neg HPV 10/4/2022     - Anatomy scan: Referral placed today     - Aneuploidy screen: MQS collected today      - Carrier screen: Declines     - AFP: Collected today      - Pregnancy physiology and precautions reviewed     - Return precautions reviewed     - Will follow-up in 4 weeks for SARINA visit     2. Maternal obesity affecting pregnancy     - Pre-gravid BMI 48.85     - TWG 12 lbs     - Reviewed healthy weight gain in pregnancy     - Early 1 hr glucose screen 89     - Reviewed Anatomy scan with MFM, serial growth scans and ANFS at 32 weeks     3. Nausea and vomiting in pregnancy     - Nausea and vomiting in pregnancy     - Reports is unable to take zofran due to muscle spasms and Reglan causes agitation     - Is taking Phenergan suppositories at bedtime only      - Reviewed scheduling medication, PO phenergan every 8 hours (utilizing suppositories only if unable to tolerate PO), scheduled vitamin B6 every 8 hours and Doxylamine at bedtime. Schedule medications for the next week prior to weaning off in order to maintain control over nausea and contribute to adequate hydration     - Reviewed conservative measures including adequate hydration and small, frequent meals     - Return precautions reviewed     4. History of  delivery     5. History of gestational diabetes     - Early 1 hr GCT 89     6.  Anxiety     - Maternal wellness questionnaire reviewed - no concerns today, score 13 (feels it is mostly due to hospitalization last week)     - Denies SI/HI     - Has been recommended to see Psychiatry by PCP      - Is prescribed Xanax, Lexapro, and Pristiq for symptoms     - Reviewed risks of medications in pregnancy     - Will avoid Xanax and plans to wean off Pristiq (has done so in the past)     - Has established with Berta Dent     - Had a postpartum plan with PCP for resuming medications, however her PCP is retiring. Reviewed working towards establishing care prior to that time, will provide any assistance needed     7. Crohn's      - Currently taking monthly injections (certolizumab)     - Will follow-up closely with GI for recommendations and alternatives, risk for congenital malformations equivalent with baseline population risk     8. History of miscarriage     9. History of ectopic pregnancy     10. History of pulmonary embolus (PE)     - Patient with reported history of PE - however was being treated for pneumonia at the time of symptoms. Patient declined CT PA and was only treated with baby ASA and not anticoagulated     - Reviewed likely shortness of breath at that time was due to Pneumonia and not PE - at this time will not treat with Lovenox based on this history      11. Urinary tract infection in pregnancy, first trimester     - E. Faecalis on urine culture     - Treated     - s/p negative urine culture x2 following     - Recent hospitalization for hydronephrosis and flank pain and treated with IV and PO antibiotics, MRI negative for stone     - Managing symptoms with PRN [place]    11. Multigravida of advanced maternal age in second trimester     - Will be 30 at time of delivery     - Aneuploidy screening collected today   - MATERNAL SCREEN 4    12.   screening encounter     - MATERNAL SCREEN [address], DO

## 2022-12-19 NOTE — PROGRESS NOTES
Maternal emotional well being screening form completed and reviewed with patient.  Current score is 13

## 2022-12-20 PROBLEM — O09.522 MULTIGRAVIDA OF ADVANCED MATERNAL AGE IN SECOND TRIMESTER: Status: ACTIVE | Noted: 2022-12-20

## 2022-12-20 PROBLEM — Z34.82 PRENATAL CARE, SUBSEQUENT PREGNANCY IN SECOND TRIMESTER: Status: ACTIVE | Noted: 2022-12-08

## 2022-12-21 ENCOUNTER — TELEPHONE (OUTPATIENT)
Dept: OBGYN CLINIC | Age: 37
End: 2022-12-21

## 2022-12-21 LAB
AFP INTERPRETATION: NORMAL
AFP MOM: 1.55
AFP SPECIMEN: NORMAL
D-INHIBIN: 150 PG/ML
DATING: NORMAL
EER MATERNAL SCREEN AFP, HCG, EST, INH: NORMAL
ESTIMATED DUE DATE: NORMAL
FETUS COUNT: NORMAL
GESTATIONAL AGE CALC AT COLLECT: NORMAL
HISTORY OF ANEUPLOIDY?: NO
HISTORY/NEURAL TUBE DEFECTS: NO
INSULIN DEP. DIABETIC: NO
MATERNAL AGE AT EDD: 36 YR
MATERNAL WEIGHT: NORMAL
MOM FOR HCG, 2ND TRIMESTER: 1.54
MOM FOR UE3: 0.77
MOM INHIBN: 1.74
PATIENT'S HCG, 2ND TRIMESTER: NORMAL IU/L
PT AFP: 34 NG/ML
PT UE3: 0.65 NG/ML
RACE: NORMAL
SMOKING: NO

## 2022-12-29 RX ORDER — PROMETHAZINE HYDROCHLORIDE 12.5 MG/1
TABLET ORAL
Qty: 20 TABLET | Refills: 0 | OUTPATIENT
Start: 2022-12-29

## 2023-01-04 RX ORDER — CERTOLIZUMAB PEGOL 400 MG
KIT SUBCUTANEOUS
Qty: 1 EACH | Refills: 5 | Status: SHIPPED | OUTPATIENT
Start: 2023-01-04

## 2023-01-04 NOTE — TELEPHONE ENCOUNTER
CLINICAL PHARMACY NOTE - SPECIALTY MEDICATION SERVICE     We received a refill request for Cimzia on 1/4/2023. Patient does have a CPA on file. Patient last met with SMS on 11/21/22 and is due for follow up in May 2023. Follow up visit is not scheduled at this time. Patient's last visit with specialist (Rheumatology - Dr. Bo Lira) was on 12/1/2022 and patient is due for follow up visit in June 2023 per office visit documentation. Labwork WNL. TB test ordered by rheumatologist at last visit for patient to complete prior to next follow-up. Original Rx was written for 1 + 5 refills on 1/3/2022. Will order refill at this time.      Caesar Lott, ScottieD  Ambulatory Clinical Pharmacist   Specialty Medication Services   Phone: [phone] option 4  1/4/2023 2:11 PM    For Pharmacy Admin Tracking Only    Program: [facility]  CPA in place:  Yes  Recommendation Provided To: Patient/Caregiver: 1 via Telephone  Intervention Detail: Refill(s) Provided  Intervention Accepted By: Patient/Caregiver: 1   Time Spent (min): 15

## 2023-01-16 ENCOUNTER — ROUTINE PRENATAL (OUTPATIENT)
Dept: OBGYN CLINIC | Age: 38
End: 2023-01-16

## 2023-01-16 ENCOUNTER — PATIENT MESSAGE (OUTPATIENT)
Dept: OBGYN CLINIC | Age: 38
End: 2023-01-16

## 2023-01-16 VITALS
BODY MASS INDEX: 51.45 KG/M2 | HEART RATE: 108 BPM | DIASTOLIC BLOOD PRESSURE: 88 MMHG | WEIGHT: 293 LBS | SYSTOLIC BLOOD PRESSURE: 118 MMHG | TEMPERATURE: 97.9 F

## 2023-01-16 DIAGNOSIS — Z98.891 HISTORY OF CESAREAN DELIVERY: ICD-10-CM

## 2023-01-16 DIAGNOSIS — F41.9 ANXIETY DISORDER, UNSPECIFIED TYPE: ICD-10-CM

## 2023-01-16 DIAGNOSIS — Z34.82 PRENATAL CARE, SUBSEQUENT PREGNANCY IN SECOND TRIMESTER: Primary | ICD-10-CM

## 2023-01-16 DIAGNOSIS — Z87.59 HISTORY OF ECTOPIC PREGNANCY: ICD-10-CM

## 2023-01-16 DIAGNOSIS — O21.9 NAUSEA AND VOMITING IN PREGNANCY: ICD-10-CM

## 2023-01-16 DIAGNOSIS — K50.119 CROHN'S DISEASE OF COLON WITH COMPLICATION (HCC): ICD-10-CM

## 2023-01-16 DIAGNOSIS — Z86.711 HISTORY OF PULMONARY EMBOLUS (PE): ICD-10-CM

## 2023-01-16 DIAGNOSIS — Z86.32 HISTORY OF GESTATIONAL DIABETES: ICD-10-CM

## 2023-01-16 DIAGNOSIS — O09.522 MULTIGRAVIDA OF ADVANCED MATERNAL AGE IN SECOND TRIMESTER: ICD-10-CM

## 2023-01-16 DIAGNOSIS — O99.210 MATERNAL OBESITY AFFECTING PREGNANCY, ANTEPARTUM: ICD-10-CM

## 2023-01-16 DIAGNOSIS — O23.41 URINARY TRACT INFECTION IN PREGNANCY, ANTEPARTUM, FIRST TRIMESTER: ICD-10-CM

## 2023-01-16 PROCEDURE — 0502F SUBSEQUENT PRENATAL CARE: CPT | Performed by: OBSTETRICS & GYNECOLOGY

## 2023-01-16 RX ORDER — ASPIRIN 81 MG/1
81 TABLET ORAL DAILY
COMMUNITY
Start: 2023-01-10

## 2023-01-16 RX ORDER — PROMETHAZINE HYDROCHLORIDE 25 MG/1
25 SUPPOSITORY RECTAL EVERY 6 HOURS PRN
Qty: 20 SUPPOSITORY | Refills: 1 | Status: SHIPPED | OUTPATIENT
Start: 2023-01-16

## 2023-01-16 NOTE — PROGRESS NOTES
Return OB Office Visit    CC:   Chief Complaint   Patient presents with    Routine Prenatal Visit       HPI:  Patient seen and examined. Doing well today. Reports nausea and vomiting remains. Had decent control with phenergan PO at 1630 and suppositories at 2330, however if she changes her diet or when she eats the nausea will worsen. Is trying to increase hydration. Denies VB, LOF, ctx. +FM. Denies headaches, vision changes, RUQ pain, increased LE edema. Denies chest pain, shortness of breath, fever, chills. Has some shortness of breath, worse with exertion and recovers with rest.  Denies pain with breathing. Review of Systems: The following ROS was otherwise negative, except as noted in the HPI: constitutional, HEENT, respiratory, cardiovascular, gastrointestinal, genitourinary, skin, musculoskeletal, neurological, psych    Objective:  /88   Pulse (!) 108   Temp 97.9 °F (36.6 °C) (Infrared)   Wt (!) 328 lb 8 oz (149 kg)   LMP 08/26/2022 (Exact Date)   BMI 51.45 kg/m²     Physical Exam  Vitals reviewed. Constitutional:       General: She is not in acute distress. Appearance: She is well-developed. HENT:      Head: Normocephalic and atraumatic. Eyes:      Conjunctiva/sclera: Conjunctivae normal.   Cardiovascular:      Rate and Rhythm: Normal rate. Pulmonary:      Effort: Pulmonary effort is normal. No respiratory distress. Abdominal:      General: There is no distension. Palpations: Abdomen is soft. Tenderness: There is no abdominal tenderness. There is no guarding or rebound. Musculoskeletal:         General: No swelling. Skin:     General: Skin is warm and dry. Neurological:      Mental Status: She is alert and oriented to person, place, and time. Psychiatric:         Mood and Affect: Mood normal.         Behavior: Behavior normal.         Thought Content:  Thought content normal.       FHR: 156 bpm by doppler    Assessment/Plan:   Francisco Mckay is a 30 y.o. [id number] at 20w3d who presents for routine OB visit    1. Prenatal care, subsequent pregnancy in second trimester     - Patient is doing well today      - Fetal wellbeing reassuring by FHR today     - Continue PNV     - PNL: A positive/ab neg, R Immune, Varicella immune, HepB non-reactive, HIV non-reactive, HepC non-reactive, Syphilis non-reactive, TSH 1.72, Hgb 13.1, Plt 224, UDS neg, UCx E. faecalis, GCCT neg/neg, Pap NILM, neg HPV 10/4/2022     - Anatomy scan: With MFM - has repeat images with their office     - Aneuploidy screen: Negative     - Carrier screen: Declines     - AFP: Negative     - Pregnancy physiology and precautions reviewed     - Return precautions reviewed     - Will follow-up in 4 weeks for SARINA visit      2. Maternal obesity affecting pregnancy     - Pre-gravid BMI 48.85     - TWG 16 lbs     - Reviewed healthy weight gain in pregnancy     - Early 1 hr glucose screen 89     - Reviewed Anatomy scan with MFM, serial growth scans and ANFS at 32 weeks     - Scheduled with MFM for repeat anatomy scan      3. Nausea and vomiting in pregnancy     - Nausea and vomiting in pregnancy     - Reports is unable to take zofran due to muscle spasms and Reglan causes agitation     - Is taking Phenergan suppositories at bedtime only      - Reviewed scheduling medication, PO phenergan every 8 hours (utilizing suppositories only if unable to tolerate PO), scheduled vitamin B6 every 8 hours and Doxylamine at bedtime. Schedule medications for the next week prior to weaning off in order to maintain control over nausea and contribute to adequate hydration     - Reviewed conservative measures including adequate hydration and small, frequent meals     - Return precautions reviewed     - Refill for Phenergan provided      4. History of  delivery     5. History of gestational diabetes     - Early 1 hr GCT 89     6.  Anxiety     - Maternal wellness questionnaire reviewed - no concerns today     - Denies SI/HI     - Has been recommended to see Psychiatry by PCP      - Is prescribed Xanax, Lexapro, and Pristiq for symptoms     - Reviewed risks of medications in pregnancy     - Will avoid Xanax and plans to wean off Pristiq (has done so in the past)     - Has established with Phan Soliman     - Had a postpartum plan with PCP for resuming medications, however her PCP is retiring. Reviewed working towards establishing care prior to that time, will provide any assistance needed     7. Crohn's      - Currently taking monthly injections (certolizumab)     - Will follow-up closely with GI for recommendations and alternatives, risk for congenital malformations equivalent with baseline population risk     8. History of miscarriage     9. History of ectopic pregnancy     10. History of pulmonary embolus (PE)     - Patient with reported history of PE - however was being treated for pneumonia at the time of symptoms. Patient declined CT PA and was only treated with baby ASA and not anticoagulated     - Reviewed likely shortness of breath at that time was due to Pneumonia and not PE - at this time will not treat with Lovenox based on this history      - Has had some shortness of breath with exertion, absent with rest     - Continue ASA     11. Urinary tract infection in pregnancy, first trimester     - E. Faecalis on urine culture     - Treated     - s/p negative urine culture x2 following     - Recent hospitalization for hydronephrosis and flank pain and treated with IV and PO antibiotics, MRI negative for stone     - Managing symptoms with PRN [place]     11.  Multigravida of advanced maternal age in second trimester     - Will be 30 at time of delivery     - MQS negative    Carlito Martinez DO

## 2023-01-17 ENCOUNTER — HOSPITAL ENCOUNTER (OUTPATIENT)
Age: 38
Discharge: HOME | End: 2023-01-17
Attending: OBSTETRICS & GYNECOLOGY | Admitting: OBSTETRICS & GYNECOLOGY
Payer: COMMERCIAL

## 2023-01-17 VITALS
DIASTOLIC BLOOD PRESSURE: 71 MMHG | SYSTOLIC BLOOD PRESSURE: 117 MMHG | TEMPERATURE: 98.3 F | HEART RATE: 104 BPM | BODY MASS INDEX: 45.99 KG/M2 | OXYGEN SATURATION: 96 % | HEIGHT: 67 IN | RESPIRATION RATE: 18 BRPM | WEIGHT: 293 LBS

## 2023-01-17 LAB
BILIRUBIN URINE: ABNORMAL
BLOOD, URINE: NEGATIVE
CLARITY: CLEAR
COLOR: YELLOW
GLUCOSE URINE: NEGATIVE MG/DL
KETONES, URINE: 40 MG/DL
LEUKOCYTE ESTERASE, URINE: NEGATIVE
MICROSCOPIC EXAMINATION: ABNORMAL
NITRITE, URINE: NEGATIVE
PH UA: 5.5 (ref 5–8)
PROTEIN UA: NEGATIVE MG/DL
SPECIFIC GRAVITY UA: >=1.03 (ref 1–1.03)
URINE TYPE: ABNORMAL
UROBILINOGEN, URINE: 0.2 E.U./DL

## 2023-01-17 PROCEDURE — 99211 OFF/OP EST MAY X REQ PHY/QHP: CPT

## 2023-01-17 PROCEDURE — 81003 URINALYSIS AUTO W/O SCOPE: CPT

## 2023-01-18 RX ORDER — PROMETHAZINE HYDROCHLORIDE 12.5 MG/1
12.5 TABLET ORAL 3 TIMES DAILY PRN
Qty: 21 TABLET | Refills: 1 | Status: SHIPPED | OUTPATIENT
Start: 2023-01-18 | End: 2023-02-01

## 2023-01-18 NOTE — H&P
[facility] and Delivery Triage Note        CHIEF COMPLAINT:  vaginal discharge    HISTORY OF PRESENT ILLNESS:      The patient is a 30 y.o. female 22w2d. OB History          6    Para   1    Term   1       0    AB   4    Living   1         SAB   2    IAB   0    Ectopic   2    Molar        Multiple   0    Live Births   1              Patient presents complaining of vaginal discharge watery at times and brown colored. Positive cramping. She denies contractions. She reports Normal fetal movement. She is unsure about vaginal bleeding. She denies leakage of amniotic fluid     Estimated Due Date:  Estimated Date of Delivery: 23    PAST MEDICAL HISTORY:   Past Medical History:   Diagnosis Date    Abnormal Pap smear of cervix     Anemia     Anxiety     Crohn's colitis ([address]     Depression     Herniated disc     Hx of blood clots     ? ? PE during pregnancy    SAB (spontaneous )     x2       PAST  SURGICAL HISTORY:   Past Surgical History:   Procedure Laterality Date     SECTION      CHOLECYSTECTOMY, LAPAROSCOPIC N/A 2017    COLONOSCOPY N/A 2/3/2021    COLONOSCOPY WITH BIOPSY performed by Love Shoemaker MD at [address]  2008    ERCP         SOCIAL HISTORY:     reports that she has never smoked. She has never used smokeless tobacco. She reports that she does not currently use drugs. She reports that she does not drink alcohol. MEDICATIONS:    Prior to Admission medications    Medication Sig Start Date End Date Taking?  Authorizing Provider   aspirin 81 MG EC tablet Take 81 mg by mouth daily 1/10/23   Historical Provider, MD   promethazine (PROMETHEGAN) 25 MG suppository Place 1 suppository rectally every 6 hours as needed for Nausea 23   Anastacia Gama,    certolizumab pegol (CIMZIA) 2 X 200 MG KIT injection Inject 400 mg under the skin every 28 days 23   Ferny Jj Mireles MD   Fluticasone Propionate (FLONASE NA) by Nasal route    Historical Provider, MD   pyridoxine (B-6) 25 MG tablet Take 1 tablet by mouth in the morning, at noon, and at bedtime 10/4/22 10/4/23  Gonsalo Morning, DO   Prenatal Vit-Fe Fumarate-FA (PRENATAL FORMULA 3 PO) Take 1 tablet by mouth daily    Historical Provider, MD   mupirocin Coy Saran) 2 % ointment  5/25/21   Historical Provider, MD   Cholecalciferol (VITAMIN D3) 50 MCG (2000 UT) CAPS Take 2,000 Units by mouth daily    Historical Provider, MD   ferrous sulfate (IRON 325) 325 (65 Fe) MG tablet Take 325 mg by mouth 3 times daily (with meals)     Historical Provider, MD   loratadine (CLARITIN) 10 MG capsule Take 10 mg by mouth daily  12/12/16   Historical Provider, MD   escitalopram (LEXAPRO) 10 MG tablet Take 10 mg by mouth daily     Historical Provider, MD        PRENATAL CARE:    Complicated by: none    REVIEW OF SYSTEMS:     A comprehensive review of systems was negative. PHYSICAL EXAM:    Vital Signs: Blood pressure 117/71, pulse (!) 104, temperature 98.3 °F (36.8 °C), temperature source Oral, resp. rate 18, height 5' 7\" (1.702 m), weight (!) 329 lb (149.2 kg), last menstrual period 08/26/2022, SpO2 96 %, currently breastfeeding.     CONSTITUTIONAL:  awake, alert, cooperative, no apparent distress, and appears stated age  EYES:  Lids and lashes normal, pupils equal, round and reactive to light, extra ocular muscles intact, sclera clear, conjunctiva normal  NECK:  Supple, symmetrical, trachea midline, no adenopathy, thyroid symmetric, not enlarged and no tenderness, skin normal  BACK:  Symmetric, no curvature, spinous processes are non-tender on palpation, paraspinous muscles are non-tender on palpation, no costal vertebral tenderness  LUNGS:  No increased work of breathing, good air exchange, clear to auscultation bilaterally, no crackles or wheezing  CARDIOVASCULAR:  Normal apical impulse, regular rate and rhythm, normal S1 and S2, no S3 or S4, and no murmur noted  ABDOMEN:  No scars, normal bowel sounds, soft, non-distended, non-tender, no masses palpated, no hepatosplenomegally  MUSCULOSKELETAL:  there is no redness, warmth, or swelling of the joints  NEUROLOGIC:  Awake, alert, oriented to name, place and time. Cranial nerves II-XII are grossly intact. Motor is 5 out of 5 bilaterally. Cerebellar finger to nose, heel to shin intact. Sensory is intact. Babinski down going, Romberg negative, and gait is normal.  SKIN:  normal skin color, texture, turgor    Fetal heart rate:  Baseline Heart Rate 140,   Cervix:  Closed Long firm -3    Contraction frequency:  0 minutes    Membranes:  fern and pool negative, negative wet prep    General Labs:      none    ASSESSMENT: vaginal discharge    20w4d.   Patient Active Problem List   Diagnosis    Pneumonia    SOB (shortness of breath)    Anxiety disorder    Calculus of gallbladder with chronic cholecystitis without obstruction    Abnormal LFTs    Acute hepatitis    Tenosynovitis, de Quervain    Abnormal uterine bleeding (AUB)    Dysmenorrhea    BMI 45.0-49.9, adult ([facility]    History of pulmonary embolus (PE)    History of miscarriage    History of ectopic pregnancy    History of  delivery    History of gestational diabetes    Crohn's disease of colon with complication ([facility]    Prenatal care, subsequent pregnancy in second trimester    Urinary tract infection in pregnancy, antepartum, first trimester    Maternal obesity affecting pregnancy, antepartum    Nausea and vomiting in pregnancy    Flank pain    Multigravida of advanced maternal age in second trimester          PLAN:  Disposition:  Patient discharged home and instructed on symptoms of labor, rupture of membranes, vaginal bleeding, fetal movement, and fever  Medications: [unfilled]   F/U Instructions: as needed    Devon Livingsotn MD  2023

## 2023-01-18 NOTE — FLOWSHEET NOTE
Dr. Edwinna Sandifer called for an update, UA results given. Ordered to have HO evaluate for fern and wet prep.

## 2023-01-18 NOTE — PROGRESS NOTES
Pt arrived to triage with complaints of brown fluid/discharge since 5 pm today, intermittent abd and back pain. + FM, denies bleeding. Will obtain doppler. Spoke with Dr. Mariella Hawkins at [phone], orders received to send UA and call with results.

## 2023-01-18 NOTE — FLOWSHEET NOTE
Pt verbalized understanding of verbal and written discharge instructions and denies having questions at this time. Pt left OB unit at 2019 ambulatory, undelivered, and in stable condition, accompanied by mother. Patient is not in active labor.

## 2023-02-08 ENCOUNTER — TELEPHONE (OUTPATIENT)
Dept: OBGYN CLINIC | Age: 38
End: 2023-02-08

## 2023-02-08 ENCOUNTER — HOSPITAL ENCOUNTER (OUTPATIENT)
Age: 38
Discharge: HOME | End: 2023-02-08
Attending: OBSTETRICS & GYNECOLOGY | Admitting: OBSTETRICS & GYNECOLOGY
Payer: COMMERCIAL

## 2023-02-08 VITALS
TEMPERATURE: 98.4 F | HEART RATE: 92 BPM | HEIGHT: 67 IN | BODY MASS INDEX: 45.99 KG/M2 | RESPIRATION RATE: 18 BRPM | SYSTOLIC BLOOD PRESSURE: 120 MMHG | DIASTOLIC BLOOD PRESSURE: 75 MMHG | WEIGHT: 293 LBS

## 2023-02-08 PROCEDURE — 99211 OFF/OP EST MAY X REQ PHY/QHP: CPT

## 2023-02-08 NOTE — PROGRESS NOTES
Discharge instruction given. Allowed time for questions/answers. Verbalizes understanding of all instructions given. Ambulated out, undelivered, not in active labor.

## 2023-02-08 NOTE — TELEPHONE ENCOUNTER
Pt is 23w,5d. Pt called stating she has not felt baby move since yesterday. Pt states she might have felt baby move last night but it was very light and could have been gas as well. Spoke with Sánchez Mcgee and her recommendation was to send pt to L&D triage for evaluation. Pt stated she will head over now. Informed L&D triage pt was heading over.

## 2023-02-08 NOTE — PROGRESS NOTES
33yo  presented to triage for a complaint of on fetal movement since yesterday evening. Hx obtained, consents signed. FHT heard and traced for approx 70 seconds.

## 2023-02-08 NOTE — DISCHARGE INSTRUCTIONS
-It is important to eat regularly. Try to eat at least three meals a day. If you cannot eat much at one meal, try small, frequent meals.    -Drink plenty of fluids, especially water. Try to avoid caffeine drinks [facility], Coke, Pepsi, coffee, tea). Do not drink alcohol.    -Rest in bed as much as possible. Try not to rest on your back.    -No sexual intercourse unless approved by your doctor or midwife.    -Avoid nipple stimulation and nipple exercises for breast feeding.    -No strenuous exercises or lifting.     Call your doctor for:    -Pressure in lower abdomen (feels like baby pushing down)    -Increase in amount or change in vaginal discharge    -Low, dull backache which is not relieved by rest    -Cramping or contractions (feels like \"the baby is balling up\") frequently    -Decrease in baby's movements    -Any fluid leaking from the vagina or any vaginal blood

## 2023-02-08 NOTE — H&P
Department of Obstetrics and Gynecology  Labor and Delivery Triage Note  [facility] for [address]:  11LT [id number] @ 23w5d complained of no fetal movement since yesterday. No pain or bleeding. Once monitor was placed, patient was able to feel fetal movement.       OBJECTIVE    Vitals:  /75   Pulse 92   Temp 98.4 °F (36.9 °C) (Oral)   Resp 18   Ht 5' 7\" (1.702 m)   Wt (!) 329 lb (149.2 kg)   LMP 08/26/2022 (Exact Date)   BMI 51.53 kg/m²     CONSTITUTIONAL:  awake, alert, cooperative, no apparent distress, and appears stated age  LUNGS:  no increased work of breathing    Fetal heart rate:         Baseline Heart Rate:  150        Accelerations:  present       Decelerations:  none       Variability:  moderate    ASSESSMENT & PLAN:      Decreased fetal movement- now resolved, with reassuring fetal tracing  Patient has follow up prenatal visit scheduled with her primary OB next week    Lisa Stiles MD

## 2023-02-13 ENCOUNTER — ROUTINE PRENATAL (OUTPATIENT)
Dept: OBGYN CLINIC | Age: 38
End: 2023-02-13

## 2023-02-13 VITALS
TEMPERATURE: 98.2 F | WEIGHT: 293 LBS | SYSTOLIC BLOOD PRESSURE: 122 MMHG | BODY MASS INDEX: 52.22 KG/M2 | DIASTOLIC BLOOD PRESSURE: 76 MMHG | HEART RATE: 108 BPM

## 2023-02-13 DIAGNOSIS — O99.210 MATERNAL OBESITY AFFECTING PREGNANCY, ANTEPARTUM: ICD-10-CM

## 2023-02-13 DIAGNOSIS — Z86.711 HISTORY OF PULMONARY EMBOLUS (PE): ICD-10-CM

## 2023-02-13 DIAGNOSIS — Z98.891 HISTORY OF CESAREAN DELIVERY: ICD-10-CM

## 2023-02-13 DIAGNOSIS — Z87.59 HISTORY OF MISCARRIAGE: ICD-10-CM

## 2023-02-13 DIAGNOSIS — Z87.59 HISTORY OF ECTOPIC PREGNANCY: ICD-10-CM

## 2023-02-13 DIAGNOSIS — O21.9 NAUSEA AND VOMITING IN PREGNANCY: ICD-10-CM

## 2023-02-13 DIAGNOSIS — O23.41 URINARY TRACT INFECTION IN PREGNANCY, ANTEPARTUM, FIRST TRIMESTER: ICD-10-CM

## 2023-02-13 DIAGNOSIS — F41.9 ANXIETY DISORDER, UNSPECIFIED TYPE: ICD-10-CM

## 2023-02-13 DIAGNOSIS — O09.522 MULTIGRAVIDA OF ADVANCED MATERNAL AGE IN SECOND TRIMESTER: ICD-10-CM

## 2023-02-13 DIAGNOSIS — Z34.82 PRENATAL CARE, SUBSEQUENT PREGNANCY IN SECOND TRIMESTER: Primary | ICD-10-CM

## 2023-02-13 DIAGNOSIS — Z86.32 HISTORY OF GESTATIONAL DIABETES: ICD-10-CM

## 2023-02-13 PROCEDURE — 0502F SUBSEQUENT PRENATAL CARE: CPT | Performed by: OBSTETRICS & GYNECOLOGY

## 2023-02-13 RX ORDER — CERTOLIZUMAB PEGOL 200 MG/ML
INJECTION, SOLUTION SUBCUTANEOUS
COMMUNITY
Start: 2023-01-31

## 2023-02-13 NOTE — PROGRESS NOTES
Return OB Office Visit    CC:   Chief Complaint   Patient presents with    Routine Prenatal Visit       HPI:  Patient seen and examined. Doing well today. Reports nausea and vomiting remains. Had decent control with phenergan PO at 1630 and suppositories at 2330, however if she changes her diet or when she eats the nausea will worsen. Is trying to increase hydration. Denies VB, LOF, ctx. +FM. Reports continued discharge, similar to past.  Denies itching, burning or other concerns. Reports increased frequency of headaches, had them with her last pregnancy, is using Afrin once a week which is helping. Denies vision changes, RUQ pain, increased LE edema. Denies chest pain, shortness of breath, fever, chills. Has some shortness of breath, worse with exertion and recovers with rest.  Denies pain with breathing. Stable, is not worsening. Reports bilateral carpal tunnel, right worse than left. Reports the braces are not working and is managing with adjusting positions. Declines physical therapy. Review of Systems: The following ROS was otherwise negative, except as noted in the HPI: constitutional, HEENT, respiratory, cardiovascular, gastrointestinal, genitourinary, skin, musculoskeletal, neurological, psych    Objective:  /76   Pulse (!) 108   Temp 98.2 °F (36.8 °C) (Infrared)   Wt (!) 333 lb 6.4 oz (151.2 kg)   LMP 08/26/2022 (Exact Date)   BMI 52.22 kg/m²     Physical Exam  Vitals reviewed. Constitutional:       General: She is not in acute distress. Appearance: She is well-developed. HENT:      Head: Normocephalic and atraumatic. Eyes:      Conjunctiva/sclera: Conjunctivae normal.   Cardiovascular:      Rate and Rhythm: Normal rate. Pulmonary:      Effort: Pulmonary effort is normal. No respiratory distress. Abdominal:      General: There is no distension. Palpations: Abdomen is soft. Tenderness: There is no abdominal tenderness.  There is no guarding or rebound. Musculoskeletal:         General: No swelling. Skin:     General: Skin is warm and dry. Neurological:      Mental Status: She is alert and oriented to person, place, and time. Psychiatric:         Mood and Affect: Mood normal.         Behavior: Behavior normal.         Thought Content: Thought content normal.       FHR: 152 bpm by doppler    Assessment/Plan:   Iver Lombard is a 30 y.o. [id number] at 24w3d who presents for routine OB visit    1. Prenatal care, subsequent pregnancy in second trimester     - Patient is doing well today      - Fetal wellbeing reassuring by FHR today     - Continue PNV     - PNL: A positive/ab neg, R Immune, Varicella immune, HepB non-reactive, HIV non-reactive, HepC non-reactive, Syphilis non-reactive, TSH 1.72, Hgb 13.1, Plt 224, UDS neg, UCx E. faecalis, GCCT neg/neg, Pap NILM, neg HPV 10/4/2022     - Anatomy scan: With MFM - has repeat images with their office     - Repeat anatomy continues to be limited      - Aneuploidy screen: NIPT within normal limits - male fetus     - Carrier screen: Declines     - AFP: Negative     - Pregnancy physiology and precautions reviewed     - Return precautions reviewed     - Will follow-up in 4 weeks for SARINA visit and growth scan     2. Maternal obesity affecting pregnancy     - Pre-gravid BMI 48.85     - TWG 21 lbs     - Reviewed healthy weight gain in pregnancy     - Early 1 hr glucose screen 89     - Reviewed Anatomy scan with MFM, serial growth scans and ANFS at 32 weeks     - Repeat anatomy images with patient     3. Nausea and vomiting in pregnancy     - Nausea and vomiting in pregnancy     - Reports is unable to take zofran due to muscle spasms and Reglan causes agitation     - Is taking Phenergan suppositories at bedtime only      - Reviewed scheduling medication, PO phenergan every 8 hours (utilizing suppositories only if unable to tolerate PO), scheduled vitamin B6 every 8 hours and Doxylamine at bedtime.   Schedule medications for the next week prior to weaning off in order to maintain control over nausea and contribute to adequate hydration     - Reviewed conservative measures including adequate hydration and small, frequent meals     - Return precautions reviewed     - Refill for Phenergan provided      4. History of  delivery     5. History of gestational diabetes     - Early 1 hr GCT 89     6. Anxiety     - Maternal wellness questionnaire reviewed - no concerns today - score 7     - Denies SI/HI     - Has been recommended to see Psychiatry by PCP      - Is prescribed Xanax, Lexapro, and Pristiq for symptoms     - Reviewed risks of medications in pregnancy     - Will avoid Xanax and plans to wean off Pristiq (has done so in the past)     - Has established with Kylee Montoya     - Had a postpartum plan with PCP for resuming medications, however her PCP is retiring. Reviewed working towards establishing care prior to that time, will provide any assistance needed     7. Crohn's      - Currently taking monthly injections (certolizumab)     - Will follow-up closely with GI for recommendations and alternatives, risk for congenital malformations equivalent with baseline population risk     8. History of miscarriage     9. History of ectopic pregnancy     10. History of pulmonary embolus (PE)     - Patient with reported history of PE - however was being treated for pneumonia at the time of symptoms. Patient declined CT PA and was only treated with baby ASA and not anticoagulated     - Reviewed likely shortness of breath at that time was due to Pneumonia and not PE - at this time will not treat with Lovenox based on this history      - Has had some shortness of breath with exertion, absent with rest - ED precautions if worsens     - Continue ASA     11. Urinary tract infection in pregnancy, first trimester     - E.  Faecalis on urine culture     - Treated     - s/p negative urine culture x2 following     - Recent hospitalization for hydronephrosis and flank pain and treated with IV and PO antibiotics, MRI negative for stone     - Managing symptoms with PRN Preston     11.  Multigravida of advanced maternal age in second trimester     - Will be 30 at time of delivery     - MQS negative     - NIPT wnl - male     Chip Bautista, DO

## 2023-02-14 ENCOUNTER — PATIENT MESSAGE (OUTPATIENT)
Dept: OBGYN CLINIC | Age: 38
End: 2023-02-14

## 2023-02-14 NOTE — TELEPHONE ENCOUNTER
From: Noberto Mcardle Massmann  To: Dr. Dunlap Alexia: 2/14/2023 9:29 AM EST  Subject: Pain in vagina    Hi,   I'm having pain and aching in my public area, vagina etc. It hurts all the time but us worse when I go from a dependent position to walking. I didn't have this with my last pregnancy. Is everything ok?   Thanks  [employer]

## 2023-02-14 NOTE — TELEPHONE ENCOUNTER
Please have patient continue to monitor symptoms. Tylenol for discomfort, increase hydration and elevate LE. She may also consider a maternity support belt. If symptoms worsen or become contractions or she has vaginal bleeding would recommend evaluation in triage. Thank you.

## 2023-02-14 NOTE — TELEPHONE ENCOUNTER
Pt is 24w,4d. Called pt, pt reports vaginal area has an aching pain all the time, when pt goes to stand she experiences stabbing pains. Pt reports she is not experiencing any vaginal bleeding. Please Advise.

## 2023-03-13 ENCOUNTER — ROUTINE PRENATAL (OUTPATIENT)
Dept: OBGYN CLINIC | Age: 38
End: 2023-03-13

## 2023-03-13 VITALS
WEIGHT: 293 LBS | TEMPERATURE: 97.9 F | SYSTOLIC BLOOD PRESSURE: 118 MMHG | DIASTOLIC BLOOD PRESSURE: 64 MMHG | HEART RATE: 106 BPM | BODY MASS INDEX: 52.47 KG/M2

## 2023-03-13 DIAGNOSIS — O23.41 URINARY TRACT INFECTION IN PREGNANCY, ANTEPARTUM, FIRST TRIMESTER: ICD-10-CM

## 2023-03-13 DIAGNOSIS — Z36.9 ANTENATAL SCREENING ENCOUNTER: ICD-10-CM

## 2023-03-13 DIAGNOSIS — O40.3XX0 POLYHYDRAMNIOS IN THIRD TRIMESTER COMPLICATION, SINGLE OR UNSPECIFIED FETUS: ICD-10-CM

## 2023-03-13 DIAGNOSIS — F41.9 ANXIETY DISORDER, UNSPECIFIED TYPE: ICD-10-CM

## 2023-03-13 DIAGNOSIS — O09.523 MULTIGRAVIDA OF ADVANCED MATERNAL AGE IN THIRD TRIMESTER: ICD-10-CM

## 2023-03-13 DIAGNOSIS — O99.210 MATERNAL OBESITY AFFECTING PREGNANCY, ANTEPARTUM: ICD-10-CM

## 2023-03-13 DIAGNOSIS — Z86.32 HISTORY OF GESTATIONAL DIABETES: ICD-10-CM

## 2023-03-13 DIAGNOSIS — Z87.59 HISTORY OF ECTOPIC PREGNANCY: ICD-10-CM

## 2023-03-13 DIAGNOSIS — Z86.711 HISTORY OF PULMONARY EMBOLUS (PE): ICD-10-CM

## 2023-03-13 DIAGNOSIS — O21.9 NAUSEA AND VOMITING IN PREGNANCY: ICD-10-CM

## 2023-03-13 DIAGNOSIS — K50.119 CROHN'S DISEASE OF COLON WITH COMPLICATION (HCC): ICD-10-CM

## 2023-03-13 DIAGNOSIS — Z87.59 HISTORY OF MISCARRIAGE: ICD-10-CM

## 2023-03-13 DIAGNOSIS — Z34.83 PRENATAL CARE, SUBSEQUENT PREGNANCY IN THIRD TRIMESTER: Primary | ICD-10-CM

## 2023-03-13 DIAGNOSIS — Z98.891 HISTORY OF CESAREAN DELIVERY: ICD-10-CM

## 2023-03-13 NOTE — PROGRESS NOTES
Maternal emotional well being screening form completed and reviewed with patient. Current score is 10. .eastobgynglucose    Patient started drink at 9:45 and finished at 9:46. Patient tolerated drink well.   1 green tube drawn at 10:46 . # 50 grams of Glucola. No complain of nausea and vomiting at this time, will continue to monitor.
with PRN Norco     13.  Multigravida of advanced maternal age in third trimester     - Will be 30 at time of delivery     - MQS negative     - NIPT wnl - male     Odean Parent, DO

## 2023-03-14 LAB
BASOPHILS ABSOLUTE: 0.1 K/UL (ref 0–0.2)
BASOPHILS RELATIVE PERCENT: 0.6 %
EOSINOPHILS ABSOLUTE: 0.2 K/UL (ref 0–0.6)
EOSINOPHILS RELATIVE PERCENT: 2.1 %
GLUCOSE CHALLENGE: 174 MG/DL
HCT VFR BLD CALC: 34 % (ref 36–48)
HEMOGLOBIN: 11.1 G/DL (ref 12–16)
LYMPHOCYTES ABSOLUTE: 1.2 K/UL (ref 1–5.1)
LYMPHOCYTES RELATIVE PERCENT: 10.3 %
MCH RBC QN AUTO: 27.9 PG (ref 26–34)
MCHC RBC AUTO-ENTMCNC: 32.7 G/DL (ref 31–36)
MCV RBC AUTO: 85.3 FL (ref 80–100)
MONOCYTES ABSOLUTE: 0.2 K/UL (ref 0–1.3)
MONOCYTES RELATIVE PERCENT: 1.9 %
NEUTROPHILS ABSOLUTE: 9.9 K/UL (ref 1.7–7.7)
NEUTROPHILS RELATIVE PERCENT: 85.1 %
PDW BLD-RTO: 17.3 % (ref 12.4–15.4)
PLATELET # BLD: 185 K/UL (ref 135–450)
PMV BLD AUTO: 7.5 FL (ref 5–10.5)
RBC # BLD: 3.99 M/UL (ref 4–5.2)
WBC # BLD: 11.6 K/UL (ref 4–11)

## 2023-03-20 ENCOUNTER — PATIENT MESSAGE (OUTPATIENT)
Dept: OBGYN CLINIC | Age: 38
End: 2023-03-20

## 2023-03-21 ENCOUNTER — NURSE ONLY (OUTPATIENT)
Dept: OBGYN CLINIC | Age: 38
End: 2023-03-21
Payer: COMMERCIAL

## 2023-03-21 DIAGNOSIS — O99.810 GLUCOSE INTOLERANCE OF PREGNANCY: Primary | ICD-10-CM

## 2023-03-21 PROCEDURE — 36415 COLL VENOUS BLD VENIPUNCTURE: CPT | Performed by: OBSTETRICS & GYNECOLOGY

## 2023-03-21 NOTE — TELEPHONE ENCOUNTER
Okay to treat as gestational diabetic. Please make sure patient has testing supplies for fasting and 1 hr after meal testing (4x a day). Bring logs to next visit and we can review. Thank you.

## 2023-03-21 NOTE — PROGRESS NOTES
.eastobgynglucose    Patient started drink at 8:53 and finished at 8:53. Patient tolerated drink well.   1 green tube drawn at 9:53, 10:53, 11:53 . # 50 grams of Glucola. No complain of nausea and vomiting at this time, will continue to monitor.

## 2023-03-22 LAB
GLUCOSE 1H P 100 G GLC PO SERPL-MCNC: 168 MG/DL
GLUCOSE 2H P 100 G GLC PO SERPL-MCNC: 115 MG/DL
GLUCOSE 3H P 100 G GLC PO SERPL-MCNC: 64 MG/DL
GLUCOSE BS SERPL-MCNC: 92 MG/DL

## 2023-03-22 NOTE — TELEPHONE ENCOUNTER
Pt will bring her current glucose monitor with her to her appt on Monday to determine if she needs a new meter or not.

## 2023-03-24 ENCOUNTER — APPOINTMENT (OUTPATIENT)
Dept: ULTRASOUND IMAGING | Age: 38
End: 2023-03-24
Payer: COMMERCIAL

## 2023-03-24 ENCOUNTER — TELEPHONE (OUTPATIENT)
Dept: OBGYN CLINIC | Age: 38
End: 2023-03-24

## 2023-03-24 ENCOUNTER — HOSPITAL ENCOUNTER (OUTPATIENT)
Age: 38
Discharge: HOME | End: 2023-03-24
Attending: OBSTETRICS & GYNECOLOGY | Admitting: OBSTETRICS & GYNECOLOGY
Payer: COMMERCIAL

## 2023-03-24 VITALS
TEMPERATURE: 98.1 F | RESPIRATION RATE: 18 BRPM | WEIGHT: 293 LBS | BODY MASS INDEX: 45.99 KG/M2 | HEIGHT: 67 IN | SYSTOLIC BLOOD PRESSURE: 114 MMHG | DIASTOLIC BLOOD PRESSURE: 65 MMHG | HEART RATE: 99 BPM | OXYGEN SATURATION: 99 %

## 2023-03-24 PROCEDURE — 76817 TRANSVAGINAL US OBSTETRIC: CPT

## 2023-03-24 PROCEDURE — 99211 OFF/OP EST MAY X REQ PHY/QHP: CPT

## 2023-03-24 ASSESSMENT — PAIN DESCRIPTION - DESCRIPTORS: DESCRIPTORS: CRAMPING;TIGHTNESS

## 2023-03-24 NOTE — PROGRESS NOTES
Dr Derek Cárdenas called and has reviewed US.  Orders to discharge pt home and to keep her appointment for this monday

## 2023-03-24 NOTE — TELEPHONE ENCOUNTER
Pt called reporting she is experiencing irregular but painful contractions and tightening in her stomach. Pt rates pain at a 6/7 on pain scale. Pt reporting she is also experiencing pressure in her bottom. Pt reports she is tearful when contractions happen. Pt states she has been walking a lot today at working and lifting. Pt states she is not experiencing loss of fluids or leakage at this time. Pt states the contractions take her breath away. Verbally spoke with on call doctor (Greg Fuentes) and she recommended pt go to L&D triage for evaluation. Pt aware of doctors recommendations and is heading to triage now from work. Called L&D triage and gave report that pt will be arriving in about 30-40 minutes.

## 2023-03-24 NOTE — DISCHARGE INSTRUCTIONS
Diet/Activity/Restrictions:  Regular  Limit caffeine consumption  Increase your fluid intake  Eat small meals-but often. Rise from laying/sitting position to standing slowly. Avoid laying on your back, sidelying positions are best, left side is preferred. Weigh yourself daily and write down what you weigh. If you had a vaginal exam you may have some bloody mucous or brown vaginal discharge. If your doctor/midwife has ordered antibiotics, get it filled right away and take all of the medication as it has been prescribed. When to call your doctor: If you have severe back pain. Period-like cramps that may come and go. May feel like baby is balling up. Low, dull backache, not relieved by rest.  Pressure in your vagina or lower abdomen that may feel like the baby is pushing down. Change in the type or amount of vaginal discharge. Abdominal cramps that may be accompanied by diarrhea. 4-5 uterine contractions in one hour. Fluid leaking from your vagina (may or may not be bloody)  If you have vaginal bleeding. If you have a temperature of 100.6 or more. If your baby has a decrease in activity. Less than 5 times in an hour. If you feel you are not getting better or you are concerned. If you develop a headache, not resolved with your usual interventions. If you have changes in your vision (blurring or spots in your vision). If you have burning with urination, urgency or frequency. If you have pain in your abdomen, up by your ribs.     General Instructions:    Nausea and Vomiting  Keep dry toast/crackers with you to munch on  Eat small frequent meals  Try to keep something in your stomach (don't let your stomach get empty)  Take your time getting up  Avoid smells that make you feel sick    Travel  Wear seatbelts or safety/lap belts  Walk frequently, every 1-2 hours  Wear clothing that does not constrict and comfortable shoes  Keep a light snack (e.g. Dry crackers) with you at all times to prevent

## 2023-03-24 NOTE — H&P
[facility] and Delivery Triage Note        CHIEF COMPLAINT:  contractions    HISTORY OF PRESENT ILLNESS:      The patient is a 30 y.o. female 30w0d. OB History          6    Para   1    Term   1       0    AB   4    Living   1         SAB   2    IAB   0    Ectopic   2    Molar        Multiple   0    Live Births   1              36yo [id number] at 30wk0d presenting with contractions that were 6/10 pain and irregular at work, since arrival to triage has not felt any   (+) FM   Denies LOF/VB     Hx c section X 1    Estimated Due Date:  Estimated Date of Delivery: 23    PAST MEDICAL HISTORY:   Past Medical History:   Diagnosis Date    Abnormal Pap smear of cervix     Anemia     Anxiety     Crohn's colitis ([facility] [address]     Depression     Herniated disc     Hx of blood clots     ? ? PE during pregnancy    SAB (spontaneous )     x2       PAST  SURGICAL HISTORY:   Past Surgical History:   Procedure Laterality Date     SECTION      CHOLECYSTECTOMY, LAPAROSCOPIC N/A 2017    COLONOSCOPY N/A 2/3/2021    COLONOSCOPY WITH BIOPSY performed by Bruno Eisenmenger, MD at [address]  2008    ERCP         SOCIAL HISTORY:     reports that she has never smoked. She has never used smokeless tobacco. She reports that she does not drink alcohol and does not use drugs. MEDICATIONS:    Prior to Admission medications    Medication Sig Start Date End Date Taking?  Authorizing Provider   CIMZIA PREFILLED 2 X 200 MG/ML PSKT injection  23   Historical Provider, MD   aspirin 81 MG EC tablet Take 81 mg by mouth daily 1/10/23   Historical Provider, MD   promethazine (PROMETHEGAN) 25 MG suppository Place 1 suppository rectally every 6 hours as needed for Nausea 23   Bijal Smith DO   certolizumab pegol (CIMZIA) 2 X 200 MG KIT injection Inject 400 mg under the skin every 28 days 23   Jimbo Fry MD

## 2023-03-24 NOTE — PROGRESS NOTES
Pt verbalized understanding of verbal and written discharge instructions and denies having questions at this time. Pt left OB unit at 1553 ambulatory, undelivered, and in stable condition, accompanied by FOB. Patient is not in active labor.

## 2023-03-25 PROBLEM — Z34.83 PRENATAL CARE, SUBSEQUENT PREGNANCY IN THIRD TRIMESTER: Status: ACTIVE | Noted: 2022-12-08

## 2023-03-25 PROBLEM — O09.523 MULTIGRAVIDA OF ADVANCED MATERNAL AGE IN THIRD TRIMESTER: Status: ACTIVE | Noted: 2022-12-20

## 2023-03-25 PROBLEM — O40.3XX0 POLYHYDRAMNIOS IN THIRD TRIMESTER: Status: ACTIVE | Noted: 2023-03-25

## 2023-03-27 ENCOUNTER — TELEPHONE (OUTPATIENT)
Dept: OBGYN CLINIC | Age: 38
End: 2023-03-27

## 2023-03-27 ENCOUNTER — ROUTINE PRENATAL (OUTPATIENT)
Dept: OBGYN CLINIC | Age: 38
End: 2023-03-27

## 2023-03-27 VITALS
DIASTOLIC BLOOD PRESSURE: 86 MMHG | BODY MASS INDEX: 52.69 KG/M2 | WEIGHT: 293 LBS | SYSTOLIC BLOOD PRESSURE: 130 MMHG | HEART RATE: 114 BPM

## 2023-03-27 DIAGNOSIS — O09.523 MULTIGRAVIDA OF ADVANCED MATERNAL AGE IN THIRD TRIMESTER: ICD-10-CM

## 2023-03-27 DIAGNOSIS — Z87.59 HISTORY OF MISCARRIAGE: ICD-10-CM

## 2023-03-27 DIAGNOSIS — R06.02 SHORTNESS OF BREATH DURING PREGNANCY: ICD-10-CM

## 2023-03-27 DIAGNOSIS — O21.9 NAUSEA AND VOMITING IN PREGNANCY: ICD-10-CM

## 2023-03-27 DIAGNOSIS — O99.210 MATERNAL OBESITY AFFECTING PREGNANCY, ANTEPARTUM: ICD-10-CM

## 2023-03-27 DIAGNOSIS — Z87.59 HISTORY OF ECTOPIC PREGNANCY: ICD-10-CM

## 2023-03-27 DIAGNOSIS — Z86.32 HISTORY OF GESTATIONAL DIABETES: ICD-10-CM

## 2023-03-27 DIAGNOSIS — Z86.711 HISTORY OF PULMONARY EMBOLUS (PE): ICD-10-CM

## 2023-03-27 DIAGNOSIS — K50.119 CROHN'S DISEASE OF COLON WITH COMPLICATION (HCC): ICD-10-CM

## 2023-03-27 DIAGNOSIS — F41.9 ANXIETY DISORDER, UNSPECIFIED TYPE: ICD-10-CM

## 2023-03-27 DIAGNOSIS — O99.891 SHORTNESS OF BREATH DURING PREGNANCY: ICD-10-CM

## 2023-03-27 DIAGNOSIS — Z98.891 HISTORY OF CESAREAN DELIVERY: ICD-10-CM

## 2023-03-27 DIAGNOSIS — O99.810 IMPAIRED GLUCOSE TOLERANCE DURING PREGNANCY: ICD-10-CM

## 2023-03-27 DIAGNOSIS — Z34.83 PRENATAL CARE, SUBSEQUENT PREGNANCY IN THIRD TRIMESTER: Primary | ICD-10-CM

## 2023-03-27 DIAGNOSIS — O23.41 URINARY TRACT INFECTION IN PREGNANCY, ANTEPARTUM, FIRST TRIMESTER: ICD-10-CM

## 2023-03-27 DIAGNOSIS — O40.3XX0 POLYHYDRAMNIOS IN THIRD TRIMESTER COMPLICATION, SINGLE OR UNSPECIFIED FETUS: ICD-10-CM

## 2023-03-27 PROCEDURE — 0502F SUBSEQUENT PRENATAL CARE: CPT | Performed by: OBSTETRICS & GYNECOLOGY

## 2023-03-27 NOTE — PROGRESS NOTES
Return OB Office Visit    CC:   Chief Complaint   Patient presents with    Routine Prenatal Visit         HPI:  Patient seen and examined. Doing well today. Was seen in triage last Friday for pain and contractions while at work. Reports prior to arrival they stopped. 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 Does not have regular contractions when she is at home. Denies VB, LOF, ctx. +FM. Has had some headaches with vision changes, BP has been < 140/90. Denies RUQ pain, increased LE edema. Denies chest pain, fever, chills, nausea, vomiting. Reports increased shortness of breath, worse with activity, has had some with lying down at night to rest.     Review of Systems: The following ROS was otherwise negative, except as noted in the HPI: constitutional, HEENT, respiratory, cardiovascular, gastrointestinal, genitourinary, skin, musculoskeletal, neurological, psych    Objective:  /86   Pulse (!) 114   Wt (!) 336 lb 6.4 oz (152.6 kg)   LMP 08/26/2022 (Exact Date)   BMI 52.69 kg/m²     Physical Exam  Vitals reviewed. Constitutional:       General: She is not in acute distress. Appearance: She is well-developed. HENT:      Head: Normocephalic and atraumatic. Eyes:      Conjunctiva/sclera: Conjunctivae normal.   Cardiovascular:      Rate and Rhythm: Normal rate. Pulmonary:      Effort: Pulmonary effort is normal. No respiratory distress. Abdominal:      General: There is no distension. Palpations: Abdomen is soft. Tenderness: There is no abdominal tenderness. There is no guarding or rebound. Musculoskeletal:         General: No swelling. Skin:     General: Skin is warm and dry. Neurological:      Mental Status: She is alert and oriented to person, place, and time.    Psychiatric:         Mood and Affect: Mood normal.         Behavior: Behavior

## 2023-03-27 NOTE — TELEPHONE ENCOUNTER
Please provide patient restrictions as noted in today's note. MFM referral has been filled out and will be sent over. Please call in diabetes testing supplies that are compatible with her insurance so that she may have a covered meter for monitoring. Thank you.

## 2023-03-27 NOTE — TELEPHONE ENCOUNTER
Pt called again, she states she was able to get an appointment with M but they will need the referral sent before her appointment on Friday. Please advise.

## 2023-03-27 NOTE — TELEPHONE ENCOUNTER
Pt called and stated that she just needs a letter that states what her restrictions are to return to work tomorrow.  She will come by office to  in the am. Please advise

## 2023-03-27 NOTE — TELEPHONE ENCOUNTER
Letter generated for  in office. Phoned in testing supplies to [facility] for testing fasting and 1 hour post prandial. Pt aware of [facility] referral to be sent as well.  DONE

## 2023-03-28 ENCOUNTER — TELEPHONE (OUTPATIENT)
Dept: PHARMACY | Age: 38
End: 2023-03-28

## 2023-03-28 NOTE — TELEPHONE ENCOUNTER
Specialty Medication Service    Date: 3/28/2023  Patient's Name: Mana Turner YOB: 1985            _____________________________________________________________________________________________    Bary Bare with patient  to schedule PharmD follow up appointment for Specialty Medication Services.  Appointment scheduled for 4/6/2023 at 1:30 PM.     Grace Avery PharmD  Ambulatory Clinical Pharmacist   Specialty Medication Services   Phone: [phone] option 4  3/28/2023 11:35 AM    For Pharmacy Admin Tracking Only    Program: [facility]  CPA in place:  Yes  Recommendation Provided To: Patient/Caregiver: 0 via Telephone  Intervention Detail: Scheduled Appointment  Intervention Accepted By: Patient/Caregiver: 1   Time Spent (min): 10

## 2023-03-31 ENCOUNTER — TELEPHONE (OUTPATIENT)
Dept: OBGYN CLINIC | Age: 38
End: 2023-03-31

## 2023-04-03 NOTE — PROGRESS NOTES
[facility] Internal Medicine  Establish care visit   2023    Karina Julio (:  1985) is a 30 y.o. female, here to establish care. Chief Complaint   Patient presents with    Establish Care        ASSESSMENT/ PLAN  1. Encounter to establish care  -Reviewed labs, previous notes. 2. Major depressive disorder with single episode, in full remission ([address]  3. ELLY (generalized anxiety disorder)  -Noted patient was on Pristiq initially. Pristiq was discontinued secondary to pregnancy. She is on Lexapro at this time. Reviewed previous PCP notes. Appears that OB is okay with her being on Lexapro. Continue at this time. Refills provided denies any SI or HI.  - escitalopram (LEXAPRO) 10 MG tablet; Take 1 tablet by mouth daily  Dispense: 90 tablet; Refill: 1    4. Crohn's disease of small intestine without complication ([facility] [address]  -Continue follow-up with GI. No new symptoms at this time    5. Rheumatoid arthritis of multiple sites with negative rheumatoid factor ([facility] [address]  -Continue follow-up with rheumatology. 6. Severe obesity ([address]  -Pregnancy. We will continue to follow-up in subsequent visits    7. 31 weeks gestation of pregnancy  -Continue follow-up with OB. Patient noted planned  in May. Follow-up with me in 6 months for anxiety, depression    HPI  Patient is a 43-year-old female presenting to establish care with me. She has a past medical history significant for anxiety, depression. Medication was discontinued as patient was pregnant. Prior to that she was on Pristiq 25 mg and was switched to Lexapro. She also has a history of Crohn's disease and rheumatoid arthritis with negative rheumatoid factor. She is currently at 31 weeks of pregnancy and following up with OB. Noted that OB is okay with her taking Lexapro. She does not have any new complaints today. She wanted to establish care with us in case her anxiety worsens in the future.   Noted of a diagnosis of Crohn's disease

## 2023-04-04 ENCOUNTER — OFFICE VISIT (OUTPATIENT)
Dept: INTERNAL MEDICINE CLINIC | Age: 38
End: 2023-04-04
Payer: COMMERCIAL

## 2023-04-04 VITALS
OXYGEN SATURATION: 98 % | BODY MASS INDEX: 44.41 KG/M2 | TEMPERATURE: 97.4 F | DIASTOLIC BLOOD PRESSURE: 78 MMHG | WEIGHT: 293 LBS | HEART RATE: 99 BPM | SYSTOLIC BLOOD PRESSURE: 118 MMHG | HEIGHT: 68 IN

## 2023-04-04 DIAGNOSIS — M06.09 RHEUMATOID ARTHRITIS OF MULTIPLE SITES WITH NEGATIVE RHEUMATOID FACTOR (HCC): ICD-10-CM

## 2023-04-04 DIAGNOSIS — Z3A.31 31 WEEKS GESTATION OF PREGNANCY: ICD-10-CM

## 2023-04-04 DIAGNOSIS — K50.00 CROHN'S DISEASE OF SMALL INTESTINE WITHOUT COMPLICATION (HCC): ICD-10-CM

## 2023-04-04 DIAGNOSIS — E66.01 SEVERE OBESITY (HCC): ICD-10-CM

## 2023-04-04 DIAGNOSIS — F41.1 GAD (GENERALIZED ANXIETY DISORDER): ICD-10-CM

## 2023-04-04 DIAGNOSIS — F32.5 MAJOR DEPRESSIVE DISORDER WITH SINGLE EPISODE, IN FULL REMISSION (HCC): ICD-10-CM

## 2023-04-04 DIAGNOSIS — Z76.89 ENCOUNTER TO ESTABLISH CARE: Primary | ICD-10-CM

## 2023-04-04 PROBLEM — E55.9 VITAMIN D DEFICIENCY: Status: ACTIVE | Noted: 2020-08-13

## 2023-04-04 PROBLEM — K50.90 CROHN'S DISEASE (HCC): Status: ACTIVE | Noted: 2023-04-04

## 2023-04-04 PROBLEM — F32.A DEPRESSION: Status: ACTIVE | Noted: 2021-02-23

## 2023-04-04 PROBLEM — M06.9 RHEUMATOID ARTHRITIS INVOLVING MULTIPLE SITES (HCC): Status: ACTIVE | Noted: 2022-10-20

## 2023-04-04 PROBLEM — M25.531 RIGHT WRIST PAIN: Status: ACTIVE | Noted: 2021-02-23

## 2023-04-04 PROBLEM — E61.1 IRON DEFICIENCY: Status: ACTIVE | Noted: 2020-08-14

## 2023-04-04 PROBLEM — M77.50 BONE SPUR OF FOOT: Status: ACTIVE | Noted: 2017-12-11

## 2023-04-04 PROBLEM — M06.00 SERONEGATIVE RHEUMATOID ARTHRITIS (HCC): Status: ACTIVE | Noted: 2021-08-31

## 2023-04-04 PROBLEM — S63.501A SPRAIN OF RIGHT WRIST: Status: ACTIVE | Noted: 2021-02-23

## 2023-04-04 PROCEDURE — 99204 OFFICE O/P NEW MOD 45 MIN: CPT | Performed by: STUDENT IN AN ORGANIZED HEALTH CARE EDUCATION/TRAINING PROGRAM

## 2023-04-04 RX ORDER — ESCITALOPRAM OXALATE 10 MG/1
10 TABLET ORAL DAILY
Qty: 90 TABLET | Refills: 1 | Status: SHIPPED | OUTPATIENT
Start: 2023-04-04

## 2023-04-04 RX ORDER — BLOOD-GLUCOSE METER
25 KIT MISCELLANEOUS 4 TIMES DAILY
COMMUNITY
Start: 2023-03-27

## 2023-04-04 SDOH — ECONOMIC STABILITY: HOUSING INSECURITY
IN THE LAST 12 MONTHS, WAS THERE A TIME WHEN YOU DID NOT HAVE A STEADY PLACE TO SLEEP OR SLEPT IN A SHELTER (INCLUDING NOW)?: NO

## 2023-04-04 SDOH — ECONOMIC STABILITY: FOOD INSECURITY: WITHIN THE PAST 12 MONTHS, THE FOOD YOU BOUGHT JUST DIDN'T LAST AND YOU DIDN'T HAVE MONEY TO GET MORE.: NEVER TRUE

## 2023-04-04 SDOH — ECONOMIC STABILITY: INCOME INSECURITY: HOW HARD IS IT FOR YOU TO PAY FOR THE VERY BASICS LIKE FOOD, HOUSING, MEDICAL CARE, AND HEATING?: NOT HARD AT ALL

## 2023-04-04 SDOH — ECONOMIC STABILITY: FOOD INSECURITY: WITHIN THE PAST 12 MONTHS, YOU WORRIED THAT YOUR FOOD WOULD RUN OUT BEFORE YOU GOT MONEY TO BUY MORE.: NEVER TRUE

## 2023-04-04 ASSESSMENT — PATIENT HEALTH QUESTIONNAIRE - PHQ9
SUM OF ALL RESPONSES TO PHQ QUESTIONS 1-9: 2
9. THOUGHTS THAT YOU WOULD BE BETTER OFF DEAD, OR OF HURTING YOURSELF: 0
SUM OF ALL RESPONSES TO PHQ9 QUESTIONS 1 & 2: 2
10. IF YOU CHECKED OFF ANY PROBLEMS, HOW DIFFICULT HAVE THESE PROBLEMS MADE IT FOR YOU TO DO YOUR WORK, TAKE CARE OF THINGS AT HOME, OR GET ALONG WITH OTHER PEOPLE: 0
5. POOR APPETITE OR OVEREATING: 0
1. LITTLE INTEREST OR PLEASURE IN DOING THINGS: 1
SUM OF ALL RESPONSES TO PHQ QUESTIONS 1-9: 2
8. MOVING OR SPEAKING SO SLOWLY THAT OTHER PEOPLE COULD HAVE NOTICED. OR THE OPPOSITE, BEING SO FIGETY OR RESTLESS THAT YOU HAVE BEEN MOVING AROUND A LOT MORE THAN USUAL: 0
SUM OF ALL RESPONSES TO PHQ QUESTIONS 1-9: 2
2. FEELING DOWN, DEPRESSED OR HOPELESS: 1
SUM OF ALL RESPONSES TO PHQ QUESTIONS 1-9: 2
6. FEELING BAD ABOUT YOURSELF - OR THAT YOU ARE A FAILURE OR HAVE LET YOURSELF OR YOUR FAMILY DOWN: 0
4. FEELING TIRED OR HAVING LITTLE ENERGY: 0
3. TROUBLE FALLING OR STAYING ASLEEP: 0
7. TROUBLE CONCENTRATING ON THINGS, SUCH AS READING THE NEWSPAPER OR WATCHING TELEVISION: 0

## 2023-04-06 ENCOUNTER — PHARMACY VISIT (OUTPATIENT)
Dept: PHARMACY | Age: 38
End: 2023-04-06

## 2023-04-06 DIAGNOSIS — K50.019 CROHN'S DISEASE OF SMALL INTESTINE WITH COMPLICATION (HCC): ICD-10-CM

## 2023-04-06 DIAGNOSIS — M06.00 SERONEGATIVE RHEUMATOID ARTHRITIS (HCC): Primary | ICD-10-CM

## 2023-04-06 RX ORDER — OXYMETAZOLINE HYDROCHLORIDE 0.05 G/100ML
2 SPRAY NASAL PRN
COMMUNITY

## 2023-04-06 RX ORDER — PRASTERONE (DHEA) 25 MG
25 CAPSULE ORAL DAILY
COMMUNITY

## 2023-04-06 ASSESSMENT — PROMIS GLOBAL HEALTH SCALE
IN GENERAL, WOULD YOU SAY YOUR QUALITY OF LIFE IS...[ON A SCALE OF 1 (POOR) TO 5 (EXCELLENT)]: 5
IN THE PAST 7 DAYS, HOW WOULD YOU RATE YOUR FATIGUE ON AVERAGE [ON A SCALE FROM 1 (NONE) TO 5 (VERY SEVERE)]?: 4
TO WHAT EXTENT ARE YOU ABLE TO CARRY OUT YOUR EVERYDAY PHYSICAL ACTIVITIES SUCH AS WALKING, CLIMBING STAIRS, CARRYING GROCERIES, OR MOVING A CHAIR [ON A SCALE OF 1 (NOT AT ALL) TO 5 (COMPLETELY)]?: 4
SUM OF RESPONSES TO QUESTIONS 2, 4, 5, & 10: 18
IN GENERAL, HOW WOULD YOU RATE YOUR MENTAL HEALTH, INCLUDING YOUR MOOD AND YOUR ABILITY TO THINK [ON A SCALE OF 1 (POOR) TO 5 (EXCELLENT)]?: 4
IN GENERAL, PLEASE RATE HOW WELL YOU CARRY OUT YOUR USUAL SOCIAL ACTIVITIES (INCLUDES ACTIVITIES AT HOME, AT WORK, AND IN YOUR COMMUNITY, AND RESPONSIBILITIES AS A PARENT, CHILD, SPOUSE, EMPLOYEE, FRIEND, ETC) [ON A SCALE OF 1 (POOR) TO 5 (EXCELLENT)]?: 5
SUM OF RESPONSES TO QUESTIONS 3, 6, 7, & 8: 13
IN GENERAL, HOW WOULD YOU RATE YOUR SATISFACTION WITH YOUR SOCIAL ACTIVITIES AND RELATIONSHIPS [ON A SCALE OF 1 (POOR) TO 5 (EXCELLENT)]?: 5
IN GENERAL, HOW WOULD YOU RATE YOUR PHYSICAL HEALTH [ON A SCALE OF 1 (POOR) TO 5 (EXCELLENT)]?: 3
IN THE PAST 7 DAYS, HOW OFTEN HAVE YOU BEEN BOTHERED BY EMOTIONAL PROBLEMS, SUCH AS FEELING ANXIOUS, DEPRESSED, OR IRRITABLE [ON A SCALE FROM 1 (NEVER) TO 5 (ALWAYS)]?: 4
IN GENERAL, WOULD YOU SAY YOUR HEALTH IS...[ON A SCALE OF 1 (POOR) TO 5 (EXCELLENT)]: 4
IN THE PAST 7 DAYS, HOW WOULD YOU RATE YOUR PAIN ON AVERAGE [ON A SCALE FROM 0 (NO PAIN) TO 10 (WORST IMAGINABLE PAIN)]?: 2

## 2023-04-06 ASSESSMENT — ROUTINE ASSESSMENT OF PATIENT INDEX DATA (RAPID3)
ON A SCALE OF ONE TO TEN, HOW DIFFICULT WAS IT FOR YOU TO COMPLETE THE LISTED DAILY PHYSICAL TASKS OVER THE LAST WEEK: 1
ON A SCALE OF ONE TO TEN, CONSIDERING ALL THE WAYS IN WHICH ILLNESS AND HEALTH CONDITIONS MAY AFFECT YOU AT THIS TIME, PLEASE INDICATE BELOW HOW YOU ARE DOING:: 1
TOTAL RAPID3 SCORE: 3
ON A SCALE OF ONE TO TEN, HOW MUCH PAIN HAVE YOU HAD BECAUSE OF YOUR CONDITION OVER THE PAST WEEK?: 2
TOTAL RAPID3 SCORE: 3

## 2023-04-06 NOTE — PROGRESS NOTES
better to keep her disease under control with the medication during pregnancy. Allergies   Allergen Reactions    Infliximab Anaphylaxis and Swelling     Face and tongue swelling      Doxycycline Diarrhea     Reported severe per patient     Vortioxetine Other (See Comments)     Severe mood change       Zofran [Ondansetron Hcl] Other (See Comments)     States has had without problems while on an antidepressant  Muscle spasms    Metoclopramide Other (See Comments)     hallucinations       Past Medical History:   Diagnosis Date    Abnormal Pap smear of cervix     Anemia     Anxiety     Crohn's colitis ([facility] [address]     Depression     Herniated disc     Hx of blood clots     ? ? PE during pregnancy    SAB (spontaneous )     x2      Social History     Tobacco Use    Smoking status: Never    Smokeless tobacco: Never   Substance Use Topics    Alcohol use: No     Family History   Problem Relation Age of Onset    Arthritis Father     High Blood Pressure Father     Crohn's Disease Father     Diabetes Paternal Aunt     Mental Retardation Paternal Aunt     Atrial Fibrillation Maternal Grandmother     High Blood Pressure Maternal Grandmother     Obesity Maternal Grandmother     Stroke Maternal Grandmother     Early Death Maternal Grandfather     Heart Disease Maternal Grandfather     High Blood Pressure Maternal Grandfather     Obesity Maternal Grandfather     Depression Paternal Grandmother     Heart Disease Paternal Grandmother     High Blood Pressure Paternal Grandmother     Kidney Disease Paternal Grandmother     Obesity Paternal Grandmother     Heart Disease Paternal Grandfather     High Blood Pressure Paternal Grandfather     No Known Problems Mother        INTERM HISTORY  Have you been diagnosed with any additional conditions since we last talked? no  Have you developed any new allergies since we last talked? no  Have you stopped taking any medications or supplements since we last talked?  yes, desvenlafaxine   Have you

## 2023-04-10 SDOH — ECONOMIC STABILITY: FOOD INSECURITY: WITHIN THE PAST 12 MONTHS, YOU WORRIED THAT YOUR FOOD WOULD RUN OUT BEFORE YOU GOT MONEY TO BUY MORE.: NEVER TRUE

## 2023-04-10 SDOH — ECONOMIC STABILITY: TRANSPORTATION INSECURITY
IN THE PAST 12 MONTHS, HAS LACK OF TRANSPORTATION KEPT YOU FROM MEETINGS, WORK, OR FROM GETTING THINGS NEEDED FOR DAILY LIVING?: NO

## 2023-04-10 SDOH — ECONOMIC STABILITY: FOOD INSECURITY: WITHIN THE PAST 12 MONTHS, THE FOOD YOU BOUGHT JUST DIDN'T LAST AND YOU DIDN'T HAVE MONEY TO GET MORE.: NEVER TRUE

## 2023-04-10 SDOH — ECONOMIC STABILITY: INCOME INSECURITY: HOW HARD IS IT FOR YOU TO PAY FOR THE VERY BASICS LIKE FOOD, HOUSING, MEDICAL CARE, AND HEATING?: NOT HARD AT ALL

## 2023-04-12 ENCOUNTER — HOSPITAL ENCOUNTER (EMERGENCY)
Age: 38
Discharge: HOME | End: 2023-04-13
Attending: EMERGENCY MEDICINE
Payer: COMMERCIAL

## 2023-04-12 DIAGNOSIS — R00.2 PALPITATIONS: ICD-10-CM

## 2023-04-12 DIAGNOSIS — R06.02 SHORTNESS OF BREATH: Primary | ICD-10-CM

## 2023-04-12 DIAGNOSIS — Z3A.32 32 WEEKS GESTATION OF PREGNANCY: ICD-10-CM

## 2023-04-12 PROCEDURE — 83735 ASSAY OF MAGNESIUM: CPT

## 2023-04-12 PROCEDURE — 85025 COMPLETE CBC W/AUTO DIFF WBC: CPT

## 2023-04-12 PROCEDURE — 84484 ASSAY OF TROPONIN QUANT: CPT

## 2023-04-12 PROCEDURE — 83880 ASSAY OF NATRIURETIC PEPTIDE: CPT

## 2023-04-12 PROCEDURE — 93005 ELECTROCARDIOGRAM TRACING: CPT | Performed by: EMERGENCY MEDICINE

## 2023-04-12 PROCEDURE — 85379 FIBRIN DEGRADATION QUANT: CPT

## 2023-04-12 PROCEDURE — 80053 COMPREHEN METABOLIC PANEL: CPT

## 2023-04-12 PROCEDURE — 99285 EMERGENCY DEPT VISIT HI MDM: CPT

## 2023-04-12 ASSESSMENT — PAIN DESCRIPTION - DESCRIPTORS: DESCRIPTORS: PRESSURE

## 2023-04-12 ASSESSMENT — PAIN DESCRIPTION - LOCATION: LOCATION: CHEST

## 2023-04-12 ASSESSMENT — PAIN SCALES - GENERAL: PAINLEVEL_OUTOF10: 5

## 2023-04-12 ASSESSMENT — PAIN - FUNCTIONAL ASSESSMENT: PAIN_FUNCTIONAL_ASSESSMENT: 0-10

## 2023-04-12 ASSESSMENT — PAIN DESCRIPTION - PAIN TYPE: TYPE: ACUTE PAIN

## 2023-04-12 ASSESSMENT — PAIN DESCRIPTION - ORIENTATION: ORIENTATION: MID

## 2023-04-13 ENCOUNTER — APPOINTMENT (OUTPATIENT)
Dept: GENERAL RADIOLOGY | Age: 38
End: 2023-04-13
Payer: COMMERCIAL

## 2023-04-13 ENCOUNTER — ROUTINE PRENATAL (OUTPATIENT)
Dept: OBGYN CLINIC | Age: 38
End: 2023-04-13

## 2023-04-13 VITALS
TEMPERATURE: 98.1 F | BODY MASS INDEX: 53 KG/M2 | SYSTOLIC BLOOD PRESSURE: 112 MMHG | DIASTOLIC BLOOD PRESSURE: 84 MMHG | WEIGHT: 293 LBS | HEART RATE: 94 BPM

## 2023-04-13 VITALS
SYSTOLIC BLOOD PRESSURE: 122 MMHG | HEIGHT: 67 IN | OXYGEN SATURATION: 100 % | HEART RATE: 89 BPM | RESPIRATION RATE: 16 BRPM | BODY MASS INDEX: 45.99 KG/M2 | TEMPERATURE: 97.6 F | WEIGHT: 293 LBS | DIASTOLIC BLOOD PRESSURE: 81 MMHG

## 2023-04-13 DIAGNOSIS — Z34.83 PRENATAL CARE, SUBSEQUENT PREGNANCY IN THIRD TRIMESTER: Primary | ICD-10-CM

## 2023-04-13 DIAGNOSIS — Z98.891 HISTORY OF CESAREAN DELIVERY: ICD-10-CM

## 2023-04-13 DIAGNOSIS — O24.414 INSULIN CONTROLLED GESTATIONAL DIABETES MELLITUS (GDM) IN THIRD TRIMESTER: ICD-10-CM

## 2023-04-13 DIAGNOSIS — K50.919 CROHN'S DISEASE WITH COMPLICATION, UNSPECIFIED GASTROINTESTINAL TRACT LOCATION (HCC): ICD-10-CM

## 2023-04-13 DIAGNOSIS — O09.523 MULTIGRAVIDA OF ADVANCED MATERNAL AGE IN THIRD TRIMESTER: ICD-10-CM

## 2023-04-13 DIAGNOSIS — O99.210 MATERNAL OBESITY AFFECTING PREGNANCY, ANTEPARTUM: ICD-10-CM

## 2023-04-13 DIAGNOSIS — Z87.59 HISTORY OF ECTOPIC PREGNANCY: ICD-10-CM

## 2023-04-13 DIAGNOSIS — O21.9 NAUSEA AND VOMITING IN PREGNANCY: ICD-10-CM

## 2023-04-13 DIAGNOSIS — R06.02 SHORTNESS OF BREATH DURING PREGNANCY: ICD-10-CM

## 2023-04-13 DIAGNOSIS — O99.891 SHORTNESS OF BREATH DURING PREGNANCY: ICD-10-CM

## 2023-04-13 DIAGNOSIS — Z86.711 HISTORY OF PULMONARY EMBOLUS (PE): ICD-10-CM

## 2023-04-13 DIAGNOSIS — Z87.59 HISTORY OF MISCARRIAGE: ICD-10-CM

## 2023-04-13 DIAGNOSIS — O40.3XX0 POLYHYDRAMNIOS IN THIRD TRIMESTER COMPLICATION, SINGLE OR UNSPECIFIED FETUS: ICD-10-CM

## 2023-04-13 DIAGNOSIS — F41.9 ANXIETY DISORDER, UNSPECIFIED TYPE: ICD-10-CM

## 2023-04-13 DIAGNOSIS — Z86.32 HISTORY OF GESTATIONAL DIABETES: ICD-10-CM

## 2023-04-13 DIAGNOSIS — O23.41 URINARY TRACT INFECTION IN PREGNANCY, ANTEPARTUM, FIRST TRIMESTER: ICD-10-CM

## 2023-04-13 LAB
ALBUMIN SERPL-MCNC: 3.6 G/DL (ref 3.4–5)
ALBUMIN/GLOB SERPL: 1.1 {RATIO} (ref 1.1–2.2)
ALP SERPL-CCNC: 80 U/L (ref 40–129)
ALT SERPL-CCNC: 10 U/L (ref 10–40)
ANION GAP SERPL CALCULATED.3IONS-SCNC: 14 MMOL/L (ref 3–16)
AST SERPL-CCNC: 11 U/L (ref 15–37)
BASOPHILS # BLD: 0 K/UL (ref 0–0.2)
BASOPHILS NFR BLD: 0.3 %
BILIRUB SERPL-MCNC: <0.2 MG/DL (ref 0–1)
BUN SERPL-MCNC: 8 MG/DL (ref 7–20)
CALCIUM SERPL-MCNC: 9.6 MG/DL (ref 8.3–10.6)
CHLORIDE SERPL-SCNC: 101 MMOL/L (ref 99–110)
CO2 SERPL-SCNC: 20 MMOL/L (ref 21–32)
CREAT SERPL-MCNC: 0.6 MG/DL (ref 0.6–1.1)
D DIMER: 0.4 UG/ML FEU (ref 0–0.6)
DEPRECATED RDW RBC AUTO: 17.2 % (ref 12.4–15.4)
EKG ATRIAL RATE: 83 BPM
EKG DIAGNOSIS: NORMAL
EKG P AXIS: 31 DEGREES
EKG P-R INTERVAL: 132 MS
EKG Q-T INTERVAL: 370 MS
EKG QRS DURATION: 86 MS
EKG QTC CALCULATION (BAZETT): 434 MS
EKG R AXIS: 9 DEGREES
EKG T AXIS: 44 DEGREES
EKG VENTRICULAR RATE: 83 BPM
EOSINOPHIL # BLD: 0.2 K/UL (ref 0–0.6)
EOSINOPHIL NFR BLD: 1.8 %
GFR SERPLBLD CREATININE-BSD FMLA CKD-EPI: >60 ML/MIN/{1.73_M2}
GLUCOSE SERPL-MCNC: 102 MG/DL (ref 70–99)
HCT VFR BLD AUTO: 34.7 % (ref 36–48)
HGB BLD-MCNC: 12 G/DL (ref 12–16)
LYMPHOCYTES # BLD: 2.1 K/UL (ref 1–5.1)
LYMPHOCYTES NFR BLD: 17.4 %
MAGNESIUM SERPL-MCNC: 1.5 MG/DL (ref 1.8–2.4)
MCH RBC QN AUTO: 28.4 PG (ref 26–34)
MCHC RBC AUTO-ENTMCNC: 34.5 G/DL (ref 31–36)
MCV RBC AUTO: 82.6 FL (ref 80–100)
MONOCYTES # BLD: 0.6 K/UL (ref 0–1.3)
MONOCYTES NFR BLD: 4.6 %
NEUTROPHILS # BLD: 9.1 K/UL (ref 1.7–7.7)
NEUTROPHILS NFR BLD: 75.9 %
NT-PROBNP SERPL-MCNC: <36 PG/ML (ref 0–124)
PLATELET # BLD AUTO: 213 K/UL (ref 135–450)
PMV BLD AUTO: 6.9 FL (ref 5–10.5)
POTASSIUM SERPL-SCNC: 3.2 MMOL/L (ref 3.5–5.1)
PROT SERPL-MCNC: 7 G/DL (ref 6.4–8.2)
RBC # BLD AUTO: 4.21 M/UL (ref 4–5.2)
SODIUM SERPL-SCNC: 135 MMOL/L (ref 136–145)
TROPONIN T SERPL-MCNC: <0.01 NG/ML
WBC # BLD AUTO: 11.9 K/UL (ref 4–11)

## 2023-04-13 PROCEDURE — 93010 ELECTROCARDIOGRAM REPORT: CPT | Performed by: INTERNAL MEDICINE

## 2023-04-13 PROCEDURE — 71045 X-RAY EXAM CHEST 1 VIEW: CPT

## 2023-04-13 PROCEDURE — 0502F SUBSEQUENT PRENATAL CARE: CPT | Performed by: OBSTETRICS & GYNECOLOGY

## 2023-04-13 RX ORDER — INSULIN HUMAN 100 [IU]/ML
10 INJECTION, SUSPENSION SUBCUTANEOUS NIGHTLY
Qty: 5 ADJUSTABLE DOSE PRE-FILLED PEN SYRINGE | Refills: 3 | Status: SHIPPED | OUTPATIENT
Start: 2023-04-13 | End: 2023-05-13

## 2023-04-13 RX ORDER — FAMOTIDINE 20 MG/1
20 TABLET, FILM COATED ORAL 2 TIMES DAILY
Qty: 60 TABLET | Refills: 3 | Status: SHIPPED | OUTPATIENT
Start: 2023-04-13

## 2023-04-13 NOTE — PROGRESS NOTES
NST reactive with baseline of 145 with multiple accels noted. Fetal strip reviewed and agreed upon by Dr. Harjinder Keita at this time.

## 2023-04-13 NOTE — ED NOTES
Patient left via personal vehicle to private residence in stable condition. Patients vitals were assessed before discharge and were stable. Patient verbalized understanding of discharge instructions, follow up and medications. RN explained information in discharge packet and patient verbalized they have no questions at this time. Patient left ER with all paperwork and belongings that RN is aware of.         Tylor Reardon RN  04/13/23 0691

## 2023-04-13 NOTE — CONSULTS
Placed call to OB/GYN @ 7631  RE: SOB in pregnancy per Darlynn Closs, MD Charlean Codding called back @ 0439

## 2023-04-13 NOTE — ED PROVIDER NOTES
Outpatient Physical Therapy  DISCHARGE SUMMARY NOTE      [place] [address] [facility]  [address]  [facility] [address]  Phone:  [phone]  Fax:  [phone]    Date of Visit: 04/25/2022    Patient: Liana Obrien  YOB: 1978  MRN: [id number]     Referring Provider: Edda Nice D.O.  [address] [place] [address]   Referring Diagnosis Acute bilateral low back pain without sciatica [M54.50]       Functional Assessment Used        Your patient is being discharged from Physical Therapy with the following comments:   · Patient cancelled or missed more than 2 scheduled appointments/non-compliant    Comments:  Pt NS 4/6, 4/25 and LC on 4/11. Had reviewed no show/late cancel policy with her last session. Appropriate to DC. Unable to assess goals.       Sal Chu, PT, DPT    Date: 4/25/2022        GENERAL: Awake, alert, NAD  HEENT: NC/AT, moist mucous membranes  THROAT: posterior pharynx without erythema, no tonsillar swelling or exudates  NECK: tenderness to palpation over right sided submandibular region with +lymphadenopathy  LUNGS: CTAB, no wheezes or crackles   CARDIAC: RRR, no m/r/g  EXT: No edema, no calf tenderness, 2+ DP pulses bilaterally, no deformities.  NEURO: A&Ox3. Moving all extremities.  SKIN: Warm and dry. No rash.  PSYCH: Normal affect. negative work-up little suspicion for serious or life-threatening condition. Fetal heart tones were obtained and were normal in the 150s. A consult is placed to the patient's obstetrician. I spoke to Dr. Yessenia Treadwell who recommended nonstress test on the fetus. This was ordered and was done. I was contacted by Dr. Yessenia Treadwell who reviewed the results of this test and informed me that it was normal.  Dr. Yessenia Treadwell also mentioned there was concern for strep based on her conversation with the patient prior to coming to the emergency room however when I spoke to the patient she said that she had contacted her OB doctor only because her son had been diagnosed with strep and she wanted to know if there are any necessary precautions that she needed to take there is anything she needed to worry about. Patient has had no fever or chills. She has no sore throat. I will not test or treat for strep at this time. Patient has an appointment with her OB doctor later today. Dr. Yessenia Treadwell asked that she keeps that appointment. Disposition Considerations (tests considered but not done, Shared Decision Making, Pt Expectation of Test or Tx.):         I am the Primary Clinician of Record. FINAL IMPRESSION      1. Shortness of breath    2. Palpitations    3. 32 weeks gestation of pregnancy          DISPOSITION/PLAN     DISPOSITION Decision To Discharge 04/13/2023 02:24:36 AM      PATIENT REFERRED TO:    Keep your scheduled appointments with your OB provider today.           DISCHARGE MEDICATIONS:  Discharge Medication List as of 4/13/2023  2:41 AM          DISCONTINUED MEDICATIONS:  Discharge Medication List as of 4/13/2023  2:41 AM                 (Please note that portions of this note were completed with a voice recognition program.  Efforts were made to edit the dictations but occasionally words are mis-transcribed.)    Jodie Chapa MD (electronically signed)           Jodie Chapa MD  04/13/23 4367

## 2023-04-13 NOTE — PROGRESS NOTES
Fetal Monitoring    Indication: fetal wellbeing, ED evaluation for shortness of breath     Baseline: 145 bpm   Variability: moderate in degree  Accelerations: Present  Decelerations: Absent                 [place]: Contractions are absent    Category 1    Reactive: Yes      First 15 minutes of tracing with indeterminate baseline with active fetal movement. Appears to be baseline 150 with accelerations to 180. Following continued monitoring baseline settled to 145 confirming reactive, category 1 tracing. Approximately 4 min of maternal tracing per RN.      Impression: Reactive, category [address], DO

## 2023-04-17 ENCOUNTER — TELEPHONE (OUTPATIENT)
Dept: OBGYN CLINIC | Age: 38
End: 2023-04-17

## 2023-04-17 NOTE — TELEPHONE ENCOUNTER
Pharmacy sent notification that the BD pan needle modesta 2nd gen are not covered by insurance. At this time she has paid out of pocket for supplies and doesn't need a refill. If she does need a refill, she will call and request the options that are covered by plan. BD Integra abrahane  Magellan Insulin Safety syringe  Monoject Insulin Syringe.

## 2023-04-18 ENCOUNTER — HOSPITAL ENCOUNTER (OUTPATIENT)
Age: 38
Discharge: HOME | End: 2023-04-18
Attending: OBSTETRICS & GYNECOLOGY | Admitting: OBSTETRICS & GYNECOLOGY
Payer: COMMERCIAL

## 2023-04-18 ENCOUNTER — ROUTINE PRENATAL (OUTPATIENT)
Dept: OBGYN CLINIC | Age: 38
End: 2023-04-18
Payer: COMMERCIAL

## 2023-04-18 VITALS
RESPIRATION RATE: 16 BRPM | TEMPERATURE: 97.6 F | SYSTOLIC BLOOD PRESSURE: 123 MMHG | HEART RATE: 87 BPM | BODY MASS INDEX: 45.99 KG/M2 | WEIGHT: 293 LBS | DIASTOLIC BLOOD PRESSURE: 72 MMHG | OXYGEN SATURATION: 98 % | HEIGHT: 67 IN

## 2023-04-18 VITALS
SYSTOLIC BLOOD PRESSURE: 120 MMHG | TEMPERATURE: 98.1 F | DIASTOLIC BLOOD PRESSURE: 84 MMHG | HEART RATE: 99 BPM | BODY MASS INDEX: 53.38 KG/M2 | WEIGHT: 293 LBS

## 2023-04-18 DIAGNOSIS — Z86.32 HISTORY OF GESTATIONAL DIABETES: ICD-10-CM

## 2023-04-18 DIAGNOSIS — K50.919 CROHN'S DISEASE WITH COMPLICATION, UNSPECIFIED GASTROINTESTINAL TRACT LOCATION (HCC): ICD-10-CM

## 2023-04-18 DIAGNOSIS — O21.9 NAUSEA AND VOMITING IN PREGNANCY: ICD-10-CM

## 2023-04-18 DIAGNOSIS — Z87.59 HISTORY OF ECTOPIC PREGNANCY: ICD-10-CM

## 2023-04-18 DIAGNOSIS — O40.3XX0 POLYHYDRAMNIOS IN THIRD TRIMESTER COMPLICATION, SINGLE OR UNSPECIFIED FETUS: ICD-10-CM

## 2023-04-18 DIAGNOSIS — Z87.59 HISTORY OF MISCARRIAGE: ICD-10-CM

## 2023-04-18 DIAGNOSIS — F41.9 ANXIETY DISORDER, UNSPECIFIED TYPE: ICD-10-CM

## 2023-04-18 DIAGNOSIS — O23.41 URINARY TRACT INFECTION IN PREGNANCY, ANTEPARTUM, FIRST TRIMESTER: ICD-10-CM

## 2023-04-18 DIAGNOSIS — Z34.83 PRENATAL CARE, SUBSEQUENT PREGNANCY IN THIRD TRIMESTER: Primary | ICD-10-CM

## 2023-04-18 DIAGNOSIS — O09.523 MULTIGRAVIDA OF ADVANCED MATERNAL AGE IN THIRD TRIMESTER: ICD-10-CM

## 2023-04-18 DIAGNOSIS — E66.01 MORBID OBESITY (HCC): ICD-10-CM

## 2023-04-18 DIAGNOSIS — O99.891 SHORTNESS OF BREATH DURING PREGNANCY: ICD-10-CM

## 2023-04-18 DIAGNOSIS — Z86.711 HISTORY OF PULMONARY EMBOLUS (PE): ICD-10-CM

## 2023-04-18 DIAGNOSIS — Z98.891 HISTORY OF CESAREAN DELIVERY: ICD-10-CM

## 2023-04-18 DIAGNOSIS — O24.414 INSULIN CONTROLLED GESTATIONAL DIABETES MELLITUS (GDM) IN THIRD TRIMESTER: ICD-10-CM

## 2023-04-18 DIAGNOSIS — R06.02 SHORTNESS OF BREATH DURING PREGNANCY: ICD-10-CM

## 2023-04-18 PROBLEM — O99.810 IMPAIRED GLUCOSE TOLERANCE DURING PREGNANCY: Status: RESOLVED | Noted: 2023-03-27 | Resolved: 2023-04-18

## 2023-04-18 LAB
GLUCOSE BLD-MCNC: 95 MG/DL (ref 70–99)
PERFORMED ON: NORMAL

## 2023-04-18 PROCEDURE — 99211 OFF/OP EST MAY X REQ PHY/QHP: CPT

## 2023-04-18 PROCEDURE — 99221 1ST HOSP IP/OBS SF/LOW 40: CPT | Performed by: OBSTETRICS & GYNECOLOGY

## 2023-04-18 PROCEDURE — 0502F SUBSEQUENT PRENATAL CARE: CPT | Performed by: OBSTETRICS & GYNECOLOGY

## 2023-04-18 PROCEDURE — 59025 FETAL NON-STRESS TEST: CPT | Performed by: OBSTETRICS & GYNECOLOGY

## 2023-04-18 NOTE — PROGRESS NOTES
Return OB Office Visit    CC:   Chief Complaint   Patient presents with    Routine Prenatal Visit    Non-stress Test       HPI:  Patient seen and examined. Is doing well today. Denies VB, LOF, ctx. +FM. Denies RUQ pain, increased LE edema. Denies chest pain, shortness of breath, fever, chills, nausea, vomiting. Reports shortness of breath and nausea are stable. Has had an increase in headaches since last visit. Has also noticed a change in her vision, has a \"hair line\" in the periphery of her right eye. Did not start insulin until Sunday because of not getting needles from pharmacy. The 2 days that she has used only 2 units of NPH at bedtime her fasting has been 91-92. Review of Systems: The following ROS was otherwise negative, except as noted in the HPI: constitutional, HEENT, respiratory, cardiovascular, gastrointestinal, genitourinary, skin, musculoskeletal, neurological, psych    Objective:  /84   Pulse 99   Temp 98.1 °F (36.7 °C) (Infrared)   Wt (!) 340 lb 12.8 oz (154.6 kg)   LMP 08/26/2022 (Exact Date)   BMI 53.38 kg/m²     Physical Exam  Vitals reviewed. Constitutional:       General: She is not in acute distress. Appearance: She is well-developed. HENT:      Head: Normocephalic and atraumatic. Eyes:      Conjunctiva/sclera: Conjunctivae normal.   Cardiovascular:      Rate and Rhythm: Normal rate. Pulmonary:      Effort: Pulmonary effort is normal. No respiratory distress. Abdominal:      General: There is no distension. Palpations: Abdomen is soft. Tenderness: There is no abdominal tenderness. There is no guarding or rebound. Musculoskeletal:         General: No swelling. Skin:     General: Skin is warm and dry. Neurological:      Mental Status: She is alert and oriented to person, place, and time. Psychiatric:         Mood and Affect: Mood normal.         Behavior: Behavior normal.         Thought Content:  Thought content normal.

## 2023-04-20 ENCOUNTER — ROUTINE PRENATAL (OUTPATIENT)
Dept: OBGYN CLINIC | Age: 38
End: 2023-04-20
Payer: COMMERCIAL

## 2023-04-20 VITALS
DIASTOLIC BLOOD PRESSURE: 84 MMHG | TEMPERATURE: 97.7 F | WEIGHT: 293 LBS | BODY MASS INDEX: 53.5 KG/M2 | HEART RATE: 98 BPM | SYSTOLIC BLOOD PRESSURE: 118 MMHG

## 2023-04-20 DIAGNOSIS — Z34.83 PRENATAL CARE, SUBSEQUENT PREGNANCY IN THIRD TRIMESTER: Primary | ICD-10-CM

## 2023-04-20 DIAGNOSIS — Z86.711 HISTORY OF PULMONARY EMBOLUS (PE): ICD-10-CM

## 2023-04-20 DIAGNOSIS — O99.891 SHORTNESS OF BREATH DURING PREGNANCY: ICD-10-CM

## 2023-04-20 DIAGNOSIS — K50.90 CROHN'S DISEASE WITHOUT COMPLICATION, UNSPECIFIED GASTROINTESTINAL TRACT LOCATION (HCC): ICD-10-CM

## 2023-04-20 DIAGNOSIS — O24.414 INSULIN CONTROLLED GESTATIONAL DIABETES MELLITUS (GDM) IN THIRD TRIMESTER: ICD-10-CM

## 2023-04-20 DIAGNOSIS — R06.02 SHORTNESS OF BREATH DURING PREGNANCY: ICD-10-CM

## 2023-04-20 DIAGNOSIS — O09.523 MULTIGRAVIDA OF ADVANCED MATERNAL AGE IN THIRD TRIMESTER: ICD-10-CM

## 2023-04-20 DIAGNOSIS — O40.3XX0 POLYHYDRAMNIOS IN THIRD TRIMESTER COMPLICATION, SINGLE OR UNSPECIFIED FETUS: ICD-10-CM

## 2023-04-20 DIAGNOSIS — O99.210 MATERNAL OBESITY AFFECTING PREGNANCY, ANTEPARTUM: ICD-10-CM

## 2023-04-20 PROCEDURE — 90471 IMMUNIZATION ADMIN: CPT | Performed by: OBSTETRICS & GYNECOLOGY

## 2023-04-20 PROCEDURE — 0502F SUBSEQUENT PRENATAL CARE: CPT | Performed by: OBSTETRICS & GYNECOLOGY

## 2023-04-20 PROCEDURE — 90715 TDAP VACCINE 7 YRS/> IM: CPT | Performed by: OBSTETRICS & GYNECOLOGY

## 2023-04-20 RX ORDER — PROMETHAZINE HYDROCHLORIDE 12.5 MG/1
TABLET ORAL
Qty: 21 TABLET | Refills: 1 | Status: SHIPPED | OUTPATIENT
Start: 2023-04-20

## 2023-04-20 NOTE — PROGRESS NOTES
Return OB Office Visit    CC:   Chief Complaint   Patient presents with    Routine Prenatal Visit    Pregnancy Ultrasound       HPI:  Pt seen and examined. No concerns/complaints. Denies VB, LOF, ctx. +FM. Currently using NPH 2 units at bedtime  BG logs reviewed:   Fastin, 103  1hr PP: 116, 107, 103               141, 97    Has been having some headaches, have been going on for 3 weeks. Using tylenol with some relief and saline nasal spray. Objective:  /84   Pulse 98   Temp 97.7 °F (36.5 °C) (Infrared)   Wt (!) 341 lb 9.6 oz (154.9 kg)   LMP 2022 (Exact Date)   BMI 53.50 kg/m²   Gen: AO, NAD  Abd: Soft, NT  FHT: 158    Assessment/Plan:  30 y.o. [id number] at 33w6d (Estimated Date of Delivery: 23) presents for SARINA appointment:      Diagnosis Orders   1. Prenatal care, subsequent pregnancy in third trimester        2. Polyhydramnios in third trimester complication, single or unspecified fetus        3. Shortness of breath during pregnancy        4. Multigravida of advanced maternal age in third trimester        5. Maternal obesity affecting pregnancy, antepartum        6. Insulin controlled gestational diabetes mellitus (GDM) in third trimester        7. History of pulmonary embolus (PE)        8.  Crohn's disease without complication, unspecified gastrointestinal tract location [facility])          Doing well today, routine care  - FWB reassuring on US today, BPP 8/8  - IDA 21.28 on 23, continue to monitor closeley  - SOB persists, has cardiology f/u with echo 23, return/warning signs discussed  - increase NPH to 4 units at bedtime  - Tdap given 23  - RCD scheduled 23 at 0800    Dispo: RTC in 3-4 days for NST  Lauren Garibay MD

## 2023-04-20 NOTE — PROGRESS NOTES
10:34 AM Given Tdap (Adacel) vaccine 0.5mL IM  Site:Left deltoid. Lot # [id number]  Expiration Date:  25  [facility] # [id number] . Patient tolerated well. No reaction noted after 20 minutes. VIS sheet provided.   Administered by: Rekha Morocho LPN

## 2023-04-24 ENCOUNTER — TELEPHONE (OUTPATIENT)
Dept: OBGYN CLINIC | Age: 38
End: 2023-04-24

## 2023-04-24 ENCOUNTER — ROUTINE PRENATAL (OUTPATIENT)
Dept: OBGYN CLINIC | Age: 38
End: 2023-04-24

## 2023-04-24 ENCOUNTER — PROCEDURE VISIT (OUTPATIENT)
Dept: OBGYN CLINIC | Age: 38
End: 2023-04-24
Payer: COMMERCIAL

## 2023-04-24 VITALS
BODY MASS INDEX: 53.72 KG/M2 | DIASTOLIC BLOOD PRESSURE: 88 MMHG | WEIGHT: 293 LBS | SYSTOLIC BLOOD PRESSURE: 110 MMHG | HEART RATE: 105 BPM

## 2023-04-24 DIAGNOSIS — O40.3XX0 POLYHYDRAMNIOS IN THIRD TRIMESTER COMPLICATION, SINGLE OR UNSPECIFIED FETUS: ICD-10-CM

## 2023-04-24 DIAGNOSIS — Z87.59 HISTORY OF MISCARRIAGE: ICD-10-CM

## 2023-04-24 DIAGNOSIS — Z34.83 PRENATAL CARE, SUBSEQUENT PREGNANCY IN THIRD TRIMESTER: Primary | ICD-10-CM

## 2023-04-24 DIAGNOSIS — Z98.891 HISTORY OF CESAREAN DELIVERY: ICD-10-CM

## 2023-04-24 DIAGNOSIS — K50.90 CROHN'S DISEASE WITHOUT COMPLICATION, UNSPECIFIED GASTROINTESTINAL TRACT LOCATION (HCC): ICD-10-CM

## 2023-04-24 DIAGNOSIS — O09.523 MULTIGRAVIDA OF ADVANCED MATERNAL AGE IN THIRD TRIMESTER: ICD-10-CM

## 2023-04-24 DIAGNOSIS — R06.02 SHORTNESS OF BREATH DURING PREGNANCY: ICD-10-CM

## 2023-04-24 DIAGNOSIS — O24.414 INSULIN CONTROLLED GESTATIONAL DIABETES MELLITUS (GDM) IN THIRD TRIMESTER: ICD-10-CM

## 2023-04-24 DIAGNOSIS — Z87.59 HISTORY OF ECTOPIC PREGNANCY: ICD-10-CM

## 2023-04-24 DIAGNOSIS — Z86.711 HISTORY OF PULMONARY EMBOLUS (PE): ICD-10-CM

## 2023-04-24 DIAGNOSIS — O99.210 MATERNAL OBESITY AFFECTING PREGNANCY, ANTEPARTUM: ICD-10-CM

## 2023-04-24 DIAGNOSIS — O99.891 SHORTNESS OF BREATH DURING PREGNANCY: ICD-10-CM

## 2023-04-24 DIAGNOSIS — E66.9 OBESITY, UNSPECIFIED CLASSIFICATION, UNSPECIFIED OBESITY TYPE, UNSPECIFIED WHETHER SERIOUS COMORBIDITY PRESENT: ICD-10-CM

## 2023-04-24 PROCEDURE — 76819 FETAL BIOPHYS PROFIL W/O NST: CPT | Performed by: OBSTETRICS & GYNECOLOGY

## 2023-04-24 PROCEDURE — 0502F SUBSEQUENT PRENATAL CARE: CPT | Performed by: OBSTETRICS & GYNECOLOGY

## 2023-04-24 NOTE — TELEPHONE ENCOUNTER
Discussed with Dr. Lynda Lazaro who is okay to perform. C/S moved to 5/19/2023 at [address], she will need to be there at 6AM.  O[place]rill for paperwork. Thank you.

## 2023-04-24 NOTE — TELEPHONE ENCOUNTER
Pt calling to see if she is able to change the date of hr section to May 19th with Dr Aga Hou as originally offered. She is also requesting FMLA be started and will send in paperwork. She says she will be in corrective action  ( seen twice weekly) if she misses this Friday and may need a note/ excuse for work.  Please advise

## 2023-04-25 NOTE — PROGRESS NOTES
31 y/o [id number] female at 29 weeks 3 days gestation with [facility] 23 presents for prenatal visit and ultrasound  Pregnancy is complicated by gestational diabetes insulin controlled, morbid obesity, polyhydramnios in 3rd trimester, AMA, Crohns disease, nausea/vomiting, shortness of breath and history of presumptive pulmonary embolus (occurred during previous pregnancy while on bedrest--declined CT PE study)  Takes 4 units NPH at bedtime  Glucose levels:  Fastin, 105, 97  1 hour postprandial: < 140  Admits to headaches and vision changes. Feels headaches are related to sinus issues. Vision changes are described as floaters. Headaches resolve with Afrin use. Patient advised to limit use of Afrin as much as possible. Denies RUQ pain. Denies vaginal bleeding, loss of fluid, and contractions. Admits to cramping similar to menstrual cramps. Notes pain in tailbone and back--wraps around. Declines cervical examination today. Cervical length 3 weeks ago reassuring. Admits to nausea and vomiting--phenergan suppositories help. Plans on getting back on oral phenergan. Has noted shortness of breath as well--echocardiogram is planned for Thursday. Has noted loose stools as well--attributes symptoms to Crohns  Denies fever, chills, chest pain, constipation, dysuria and hematuria. Maternal Wellness Questionnaire reviewed--no concerns. Bryce Carson ULTRASOUND:  BIOPHYSICAL PROFILE     DATE: 23     PHYSICIAN: LIAM OBANDO     SONOGRAPHER: DAVY Davidson RDMS     INDICATION: Fetal well being     TYPE OF SCAN: abdominal     BPP: 88  Tone: 2, Gross fetal movement: 2, Breathin, Fluid: 2     FINDINGS:  A single viable intrauterine pregnancy is noted in cephalic presentation. Cardiac and somatic activity are noted.      The following values were obtained:              Fetal heart rate 146bpm              Amniotic fluid index 22.07cm     IMPRESSION:   Single live IUP in the third trimester. BPP 8/8.  IDA

## 2023-04-27 ENCOUNTER — TELEPHONE (OUTPATIENT)
Dept: INTERNAL MEDICINE CLINIC | Age: 38
End: 2023-04-27

## 2023-04-27 ENCOUNTER — TELEPHONE (OUTPATIENT)
Dept: OBGYN CLINIC | Age: 38
End: 2023-04-27

## 2023-04-27 ENCOUNTER — HOSPITAL ENCOUNTER (OUTPATIENT)
Dept: NON INVASIVE DIAGNOSTICS | Age: 38
Discharge: HOME | End: 2023-04-27
Payer: COMMERCIAL

## 2023-04-27 DIAGNOSIS — O99.891 SHORTNESS OF BREATH DURING PREGNANCY: ICD-10-CM

## 2023-04-27 DIAGNOSIS — R06.02 SHORTNESS OF BREATH DURING PREGNANCY: ICD-10-CM

## 2023-04-27 LAB
LV EF: 53 %
LVEF MODALITY: NORMAL

## 2023-04-27 PROCEDURE — 93306 TTE W/DOPPLER COMPLETE: CPT

## 2023-04-27 NOTE — TELEPHONE ENCOUNTER
Patient had an echo 2D w doppler w color w contrast done today, 4/27/23. It was ordered by her OB/GYN (Dr. Michele Newton) for shortness of breath during pregnancy. OB is out of the town and patient would like someone to go over those results with her explain the results to her. She's 37 weeks pregnant presently. She didn't want to wait until Monday when Dr. Mike ePrez is back in office.     [facility]  [phone]

## 2023-04-28 ENCOUNTER — ROUTINE PRENATAL (OUTPATIENT)
Dept: OBGYN CLINIC | Age: 38
End: 2023-04-28

## 2023-04-28 ENCOUNTER — CARE COORDINATION (OUTPATIENT)
Dept: OTHER | Facility: CLINIC | Age: 38
End: 2023-04-28

## 2023-04-28 ENCOUNTER — TELEPHONE (OUTPATIENT)
Dept: OBGYN CLINIC | Age: 38
End: 2023-04-28

## 2023-04-28 VITALS — WEIGHT: 293 LBS | BODY MASS INDEX: 53.5 KG/M2 | HEART RATE: 92 BPM

## 2023-04-28 DIAGNOSIS — R06.02 SHORTNESS OF BREATH DURING PREGNANCY: ICD-10-CM

## 2023-04-28 DIAGNOSIS — Z87.59 HISTORY OF MISCARRIAGE: ICD-10-CM

## 2023-04-28 DIAGNOSIS — E66.01 MORBID OBESITY (HCC): ICD-10-CM

## 2023-04-28 DIAGNOSIS — Z34.83 PRENATAL CARE, SUBSEQUENT PREGNANCY IN THIRD TRIMESTER: Primary | ICD-10-CM

## 2023-04-28 DIAGNOSIS — O09.523 MULTIGRAVIDA OF ADVANCED MATERNAL AGE IN THIRD TRIMESTER: ICD-10-CM

## 2023-04-28 DIAGNOSIS — O23.41 URINARY TRACT INFECTION IN PREGNANCY, ANTEPARTUM, FIRST TRIMESTER: ICD-10-CM

## 2023-04-28 DIAGNOSIS — R93.1 ABNORMAL ECHOCARDIOGRAM: ICD-10-CM

## 2023-04-28 DIAGNOSIS — O24.414 INSULIN CONTROLLED GESTATIONAL DIABETES MELLITUS (GDM) IN THIRD TRIMESTER: ICD-10-CM

## 2023-04-28 DIAGNOSIS — F41.9 ANXIETY DISORDER, UNSPECIFIED TYPE: ICD-10-CM

## 2023-04-28 DIAGNOSIS — O99.891 SHORTNESS OF BREATH DURING PREGNANCY: ICD-10-CM

## 2023-04-28 DIAGNOSIS — Z98.891 HISTORY OF CESAREAN DELIVERY: ICD-10-CM

## 2023-04-28 DIAGNOSIS — K50.90 CROHN'S DISEASE WITHOUT COMPLICATION, UNSPECIFIED GASTROINTESTINAL TRACT LOCATION (HCC): ICD-10-CM

## 2023-04-28 DIAGNOSIS — Z87.59 HISTORY OF ECTOPIC PREGNANCY: ICD-10-CM

## 2023-04-28 DIAGNOSIS — Z86.32 HISTORY OF GESTATIONAL DIABETES: ICD-10-CM

## 2023-04-28 DIAGNOSIS — Z86.711 HISTORY OF PULMONARY EMBOLUS (PE): ICD-10-CM

## 2023-04-28 DIAGNOSIS — O21.9 NAUSEA AND VOMITING IN PREGNANCY: ICD-10-CM

## 2023-04-28 DIAGNOSIS — O40.3XX0 POLYHYDRAMNIOS IN THIRD TRIMESTER COMPLICATION, SINGLE OR UNSPECIFIED FETUS: ICD-10-CM

## 2023-04-28 NOTE — CARE COORDINATION
Ambulatory Care Coordination Note    ACM attempted to reach patient for Associate Care Management related to Maternity CM follow-up call. HIPAA compliant message left requesting a return phone call at patient convenience. Plan for follow-up call in 7-10 days      Future Appointments   Date Time Provider Saturnino Pérezi   5/2/2023  9:00  [address] RM 1 [facility] OB/GYN [facility]   5/2/2023  9:50 AM Guy Ramsey DO [facility] OB/GYN [facility]   5/4/2023  2:00 PM SCHEDULE, [facility] OB/GYN NST [facility]   5/4/2023  2:45 PM Bean Ventura MD [facility] OB/GYN [facility]   5/8/2023  9:00 AM [facility] OB/GYN US RM 1 [facility] OB/GYN [facility]   5/8/2023  9:50 AM Guy Ramsey, DO [facility] OB/GYN [facility]   5/12/2023  9:30 AM SCHEDULE, [facility] OB/GYN NST [facility]   5/12/2023 10:00 AM Rafal Aaron DO [facility] OB/GY[facility]   5/16/2023 10:00 AM [facility] OB/GYN US RM 2 [facility]   5/16/2023 10:30 AM Rafal Aaron DO [facility]   10/5/2023 11:20 AM Kody Santos MD [facility] AND GILBERTO DewittN, RN  Associate Care Manager   Cell: [phone]  [email or web address]

## 2023-04-28 NOTE — TELEPHONE ENCOUNTER
[phone] (home)   Spoke with patient regarding Cardiology Consult that has been scheduled with Dr. Keira Canseco ([address] [address]) on 05/11/2023 @ 1130am.     Patient is aware and Dr. Niru Marcelino has placed referral for Consult.

## 2023-04-28 NOTE — TELEPHONE ENCOUNTER
Dr Donnie Manjarrez should have an OB who is covering for her while she is out of town. Can she contact that provider?

## 2023-05-02 ENCOUNTER — ROUTINE PRENATAL (OUTPATIENT)
Dept: OBGYN CLINIC | Age: 38
End: 2023-05-02

## 2023-05-02 VITALS
BODY MASS INDEX: 53.53 KG/M2 | HEART RATE: 92 BPM | WEIGHT: 293 LBS | DIASTOLIC BLOOD PRESSURE: 68 MMHG | SYSTOLIC BLOOD PRESSURE: 126 MMHG | TEMPERATURE: 97.2 F

## 2023-05-02 DIAGNOSIS — E66.01 MORBID OBESITY (HCC): ICD-10-CM

## 2023-05-02 DIAGNOSIS — O09.523 MULTIGRAVIDA OF ADVANCED MATERNAL AGE IN THIRD TRIMESTER: ICD-10-CM

## 2023-05-02 DIAGNOSIS — O24.414 INSULIN CONTROLLED GESTATIONAL DIABETES MELLITUS (GDM) IN THIRD TRIMESTER: ICD-10-CM

## 2023-05-02 DIAGNOSIS — Z87.59 HISTORY OF ECTOPIC PREGNANCY: ICD-10-CM

## 2023-05-02 DIAGNOSIS — Z98.891 HISTORY OF CESAREAN DELIVERY: ICD-10-CM

## 2023-05-02 DIAGNOSIS — R93.1 ABNORMAL ECHOCARDIOGRAM: ICD-10-CM

## 2023-05-02 DIAGNOSIS — K50.90 CROHN'S DISEASE WITHOUT COMPLICATION, UNSPECIFIED GASTROINTESTINAL TRACT LOCATION (HCC): ICD-10-CM

## 2023-05-02 DIAGNOSIS — Z86.32 HISTORY OF GESTATIONAL DIABETES: ICD-10-CM

## 2023-05-02 DIAGNOSIS — R51.9 PREGNANCY HEADACHE IN THIRD TRIMESTER: ICD-10-CM

## 2023-05-02 DIAGNOSIS — O23.41 URINARY TRACT INFECTION IN PREGNANCY, ANTEPARTUM, FIRST TRIMESTER: ICD-10-CM

## 2023-05-02 DIAGNOSIS — Z86.711 HISTORY OF PULMONARY EMBOLUS (PE): ICD-10-CM

## 2023-05-02 DIAGNOSIS — O21.9 NAUSEA AND VOMITING IN PREGNANCY: ICD-10-CM

## 2023-05-02 DIAGNOSIS — O40.3XX0 POLYHYDRAMNIOS IN THIRD TRIMESTER COMPLICATION, SINGLE OR UNSPECIFIED FETUS: ICD-10-CM

## 2023-05-02 DIAGNOSIS — F41.9 ANXIETY DISORDER, UNSPECIFIED TYPE: ICD-10-CM

## 2023-05-02 DIAGNOSIS — Z87.59 HISTORY OF MISCARRIAGE: ICD-10-CM

## 2023-05-02 DIAGNOSIS — R06.02 SHORTNESS OF BREATH DURING PREGNANCY: ICD-10-CM

## 2023-05-02 DIAGNOSIS — O99.891 SHORTNESS OF BREATH DURING PREGNANCY: ICD-10-CM

## 2023-05-02 DIAGNOSIS — Z34.83 PRENATAL CARE, SUBSEQUENT PREGNANCY IN THIRD TRIMESTER: Primary | ICD-10-CM

## 2023-05-02 DIAGNOSIS — O26.893 PREGNANCY HEADACHE IN THIRD TRIMESTER: ICD-10-CM

## 2023-05-02 RX ORDER — BUTALBITAL, ACETAMINOPHEN, CAFFEINE AND CODEINE PHOSPHATE 300; 50; 40; 30 MG/1; MG/1; MG/1; MG/1
1 CAPSULE ORAL EVERY 4 HOURS PRN
Qty: 20 CAPSULE | Refills: 1 | Status: SHIPPED | OUTPATIENT
Start: 2023-05-02 | End: 2023-05-12

## 2023-05-02 NOTE — PROGRESS NOTES
Return OB Office Visit    CC:   Chief Complaint   Patient presents with    Routine Prenatal Visit    Pregnancy Ultrasound       HPI:  Patient seen and examined. Doing well today. Denies VB, LOF, ctx. +FM. Reports a headache for the past 24 hours. Has a BP cuff at home, however has not checked it recently. Is taking Tylenol 1g every 6 hours. Has had a stable floater in her peripheral vision. Denies RUQ pain, increased LE edema. Denies chest pain, fever, chills, vomiting. Shortness of breath as well as nausea and vomiting are stable. On Friday took 8 units of NPH and then increased to 10 units on Saturday. Has an appt with Cardiology scheduled 5/11/2023. Review of Systems: The following ROS was otherwise negative, except as noted in the HPI: constitutional, HEENT, respiratory, cardiovascular, gastrointestinal, genitourinary, skin, musculoskeletal, neurological, psych    Objective:  /68   Pulse 92   Temp 97.2 °F (36.2 °C) (Infrared)   Wt (!) 341 lb 12.8 oz (155 kg)   LMP 08/26/2022 (Exact Date)   BMI 53.53 kg/m²     Physical Exam  Vitals reviewed. Constitutional:       General: She is not in acute distress. Appearance: She is well-developed. HENT:      Head: Normocephalic and atraumatic. Eyes:      Conjunctiva/sclera: Conjunctivae normal.   Cardiovascular:      Rate and Rhythm: Normal rate. Pulmonary:      Effort: Pulmonary effort is normal. No respiratory distress. Abdominal:      General: There is no distension. Palpations: Abdomen is soft. Tenderness: There is no abdominal tenderness. There is no guarding or rebound. Musculoskeletal:         General: No swelling. Skin:     General: Skin is warm and dry. Neurological:      Mental Status: She is alert and oriented to person, place, and time. Psychiatric:         Mood and Affect: Mood normal.         Behavior: Behavior normal.         Thought Content:  Thought content normal.       Ultrasound  OB

## 2023-05-02 NOTE — PROGRESS NOTES
Maternal emotional well being screening form completed and reviewed with patient.  Current score is 6

## 2023-05-03 LAB
ALBUMIN SERPL-MCNC: 3.5 G/DL (ref 3.4–5)
ALBUMIN/GLOB SERPL: 1.1 {RATIO} (ref 1.1–2.2)
ALP SERPL-CCNC: 91 U/L (ref 40–129)
ALT SERPL-CCNC: 13 U/L (ref 10–40)
ANION GAP SERPL CALCULATED.3IONS-SCNC: 13 MMOL/L (ref 3–16)
AST SERPL-CCNC: 17 U/L (ref 15–37)
BASOPHILS # BLD: 0 K/UL (ref 0–0.2)
BASOPHILS NFR BLD: 0.2 %
BILIRUB SERPL-MCNC: 0.3 MG/DL (ref 0–1)
BUN SERPL-MCNC: 5 MG/DL (ref 7–20)
CALCIUM SERPL-MCNC: 9.3 MG/DL (ref 8.3–10.6)
CHLORIDE SERPL-SCNC: 105 MMOL/L (ref 99–110)
CO2 SERPL-SCNC: 19 MMOL/L (ref 21–32)
CREAT SERPL-MCNC: <0.5 MG/DL (ref 0.6–1.1)
CREAT UR-MCNC: 93.8 MG/DL (ref 28–259)
DEPRECATED RDW RBC AUTO: 18.6 % (ref 12.4–15.4)
EOSINOPHIL # BLD: 0.2 K/UL (ref 0–0.6)
EOSINOPHIL NFR BLD: 1.8 %
GFR SERPLBLD CREATININE-BSD FMLA CKD-EPI: >60 ML/MIN/{1.73_M2}
GLUCOSE SERPL-MCNC: 88 MG/DL (ref 70–99)
HCT VFR BLD AUTO: 35.7 % (ref 36–48)
HGB BLD-MCNC: 11.3 G/DL (ref 12–16)
LDH SERPL L TO P-CCNC: 315 U/L (ref 100–190)
LYMPHOCYTES # BLD: 1.4 K/UL (ref 1–5.1)
LYMPHOCYTES NFR BLD: 12.8 %
MCH RBC QN AUTO: 27.4 PG (ref 26–34)
MCHC RBC AUTO-ENTMCNC: 31.6 G/DL (ref 31–36)
MCV RBC AUTO: 86.7 FL (ref 80–100)
MONOCYTES # BLD: 0.5 K/UL (ref 0–1.3)
MONOCYTES NFR BLD: 4.3 %
NEUTROPHILS # BLD: 8.9 K/UL (ref 1.7–7.7)
NEUTROPHILS NFR BLD: 80.9 %
PLATELET # BLD AUTO: 208 K/UL (ref 135–450)
PMV BLD AUTO: 7 FL (ref 5–10.5)
POTASSIUM SERPL-SCNC: 3.9 MMOL/L (ref 3.5–5.1)
PROT SERPL-MCNC: 6.7 G/DL (ref 6.4–8.2)
PROT UR-MCNC: 13 MG/DL
PROT/CREAT UR-RTO: 0.1 MG/DL
RBC # BLD AUTO: 4.12 M/UL (ref 4–5.2)
SODIUM SERPL-SCNC: 137 MMOL/L (ref 136–145)
URATE SERPL-MCNC: 3.4 MG/DL (ref 2.6–6)
WBC # BLD AUTO: 11 K/UL (ref 4–11)

## 2023-05-04 ENCOUNTER — ROUTINE PRENATAL (OUTPATIENT)
Dept: OBGYN CLINIC | Age: 38
End: 2023-05-04

## 2023-05-04 VITALS
SYSTOLIC BLOOD PRESSURE: 108 MMHG | HEART RATE: 103 BPM | BODY MASS INDEX: 53.25 KG/M2 | DIASTOLIC BLOOD PRESSURE: 60 MMHG | WEIGHT: 293 LBS

## 2023-05-04 DIAGNOSIS — K50.90 CROHN'S DISEASE WITHOUT COMPLICATION, UNSPECIFIED GASTROINTESTINAL TRACT LOCATION (HCC): ICD-10-CM

## 2023-05-04 DIAGNOSIS — O40.3XX0 POLYHYDRAMNIOS IN THIRD TRIMESTER COMPLICATION, SINGLE OR UNSPECIFIED FETUS: ICD-10-CM

## 2023-05-04 DIAGNOSIS — O99.210 MATERNAL OBESITY AFFECTING PREGNANCY, ANTEPARTUM: ICD-10-CM

## 2023-05-04 DIAGNOSIS — Z34.83 PRENATAL CARE, SUBSEQUENT PREGNANCY IN THIRD TRIMESTER: Primary | ICD-10-CM

## 2023-05-04 DIAGNOSIS — O24.414 INSULIN CONTROLLED GESTATIONAL DIABETES MELLITUS (GDM) IN THIRD TRIMESTER: ICD-10-CM

## 2023-05-04 DIAGNOSIS — Z86.711 HISTORY OF PULMONARY EMBOLUS (PE): ICD-10-CM

## 2023-05-04 DIAGNOSIS — O09.523 MULTIGRAVIDA OF ADVANCED MATERNAL AGE IN THIRD TRIMESTER: ICD-10-CM

## 2023-05-04 NOTE — PROGRESS NOTES
Return OB Office Visit    CC:   Chief Complaint   Patient presents with    Routine Prenatal Visit     HPI:  Pt seen and examined. No concerns/complaints. Denies VB, LOF, ctx. +FM. BG logs reviewed:  - Fastings: low 90s  - 1hr PP: all within range  NPH 10 at bedtime     Maternal wellness questionnaire reviewed - no concerns today. Score 7    Objective:  /60   Pulse (!) 103   Wt (!) 340 lb (154.2 kg)   LMP 08/26/2022 (Exact Date)   BMI 53.25 kg/m²   Gen: AO, NAD  Abd: Soft, NT  FHT: 150    Assessment/Plan:  30 y.o. [id number] at 35w6d (Estimated Date of Delivery: 6/2/23) presents for SARINA appointment:      Diagnosis Orders   1. Prenatal care, subsequent pregnancy in third trimester        2. Maternal obesity affecting pregnancy, antepartum        3. Multigravida of advanced maternal age in third trimester  0 - DE FETAL NON-STRESS TEST      4. Insulin controlled gestational diabetes mellitus (GDM) in third trimester  41563 - DE FETAL NON-STRESS TEST      5. Crohn's disease without complication, unspecified gastrointestinal tract location ([address]        6. History of pulmonary embolus (PE)        7.  Polyhydramnios in third trimester complication, single or unspecified fetus          Doing well today, routine care  - FWB reassuring on NST, reactive  - BG within goal, continue current NPH at bedtime (10 units)  - has seen cardiology for SOB, echo completed 4/27/23  - continue 2x/week ANFS and q4wk growth US  - poly resovled at last scan    Dispo: RTC in 3-4 days  Adelita Gutierrez MD

## 2023-05-05 ENCOUNTER — CARE COORDINATION (OUTPATIENT)
Dept: OTHER | Facility: CLINIC | Age: 38
End: 2023-05-05

## 2023-05-05 PROBLEM — R93.1 ABNORMAL ECHOCARDIOGRAM: Status: ACTIVE | Noted: 2023-05-05

## 2023-05-05 PROBLEM — R51.9 PREGNANCY HEADACHE IN THIRD TRIMESTER: Status: ACTIVE | Noted: 2023-05-05

## 2023-05-05 PROBLEM — O26.893 PREGNANCY HEADACHE IN THIRD TRIMESTER: Status: ACTIVE | Noted: 2023-05-05

## 2023-05-05 NOTE — CARE COORDINATION
Ambulatory Care Coordination Note    ACM attempted 2nd outreach to patient for  Associate Care Management related to Maternity CM follow-up call. HIPAA compliant message left requesting a return phone call at patient convenience. Unable to Reach Letter sent to patient via [employer]. Will continue to outreach. Future Appointments   Date Time Provider Saturnino [place]   5/8/2023  9:00  [address] RM 1 [facility] OB/GYN [facility]   5/8/2023  9:50 AM Ingrid Mills DO [facility]/GY[facility]   5/11/2023 11:30 AM Aleksandr Rodriguez MD [facility]   5/12/2023  9:30 AM SCHEDULE, [facility] OB/GYN NST [facility]   5/12/2023 10:00 AM Mili Barney DO [facility] OB/GYN [facility]   5/16/2023 10:00 AM [facility] OB/GYN  RM 2 [facility] OB/GYN [facility]   5/16/2023 10:30 AM Mili Barney DO [facility] OB/GYN [facility]   10/5/2023 11:20 AM Dennis Rodarte MD [facility] AND KATE Lowry, RN  Associate Care Manager   Cell: [phone]  [email or web address]

## 2023-05-07 ENCOUNTER — TELEPHONE (OUTPATIENT)
Dept: OBGYN CLINIC | Age: 38
End: 2023-05-07

## 2023-05-07 LAB
GP B STREP SPEC QL CULT: ABNORMAL
ORGANISM: ABNORMAL

## 2023-05-08 ENCOUNTER — OFFICE VISIT (OUTPATIENT)
Dept: INTERNAL MEDICINE CLINIC | Age: 38
End: 2023-05-08
Payer: COMMERCIAL

## 2023-05-08 ENCOUNTER — ROUTINE PRENATAL (OUTPATIENT)
Dept: OBGYN CLINIC | Age: 38
End: 2023-05-08

## 2023-05-08 VITALS
WEIGHT: 293 LBS | TEMPERATURE: 97.2 F | SYSTOLIC BLOOD PRESSURE: 128 MMHG | HEART RATE: 122 BPM | DIASTOLIC BLOOD PRESSURE: 70 MMHG | BODY MASS INDEX: 53.72 KG/M2

## 2023-05-08 VITALS
RESPIRATION RATE: 14 BRPM | HEART RATE: 86 BPM | DIASTOLIC BLOOD PRESSURE: 80 MMHG | WEIGHT: 293 LBS | SYSTOLIC BLOOD PRESSURE: 130 MMHG | BODY MASS INDEX: 53.88 KG/M2 | OXYGEN SATURATION: 97 % | TEMPERATURE: 97.2 F

## 2023-05-08 DIAGNOSIS — R09.89 UPPER RESPIRATORY SYMPTOM: ICD-10-CM

## 2023-05-08 DIAGNOSIS — R05.1 ACUTE COUGH: Primary | ICD-10-CM

## 2023-05-08 DIAGNOSIS — Z34.83 PRENATAL CARE, SUBSEQUENT PREGNANCY IN THIRD TRIMESTER: Primary | ICD-10-CM

## 2023-05-08 DIAGNOSIS — O99.891 SHORTNESS OF BREATH DURING PREGNANCY: ICD-10-CM

## 2023-05-08 DIAGNOSIS — R06.02 SHORTNESS OF BREATH DURING PREGNANCY: ICD-10-CM

## 2023-05-08 DIAGNOSIS — Z86.32 HISTORY OF GESTATIONAL DIABETES: ICD-10-CM

## 2023-05-08 DIAGNOSIS — O23.41 URINARY TRACT INFECTION IN PREGNANCY, ANTEPARTUM, FIRST TRIMESTER: ICD-10-CM

## 2023-05-08 DIAGNOSIS — F41.9 ANXIETY DISORDER, UNSPECIFIED TYPE: ICD-10-CM

## 2023-05-08 DIAGNOSIS — O99.210 MATERNAL OBESITY AFFECTING PREGNANCY, ANTEPARTUM: ICD-10-CM

## 2023-05-08 DIAGNOSIS — O26.893 PREGNANCY HEADACHE IN THIRD TRIMESTER: ICD-10-CM

## 2023-05-08 DIAGNOSIS — K50.90 CROHN'S DISEASE WITHOUT COMPLICATION, UNSPECIFIED GASTROINTESTINAL TRACT LOCATION (HCC): ICD-10-CM

## 2023-05-08 DIAGNOSIS — R93.1 ABNORMAL ECHOCARDIOGRAM: ICD-10-CM

## 2023-05-08 DIAGNOSIS — Z86.711 HISTORY OF PULMONARY EMBOLUS (PE): ICD-10-CM

## 2023-05-08 DIAGNOSIS — O21.9 NAUSEA AND VOMITING IN PREGNANCY: ICD-10-CM

## 2023-05-08 DIAGNOSIS — Z98.891 HISTORY OF CESAREAN DELIVERY: ICD-10-CM

## 2023-05-08 DIAGNOSIS — Z87.59 HISTORY OF MISCARRIAGE: ICD-10-CM

## 2023-05-08 DIAGNOSIS — O09.523 MULTIGRAVIDA OF ADVANCED MATERNAL AGE IN THIRD TRIMESTER: ICD-10-CM

## 2023-05-08 DIAGNOSIS — Z87.59 HISTORY OF ECTOPIC PREGNANCY: ICD-10-CM

## 2023-05-08 DIAGNOSIS — O24.414 INSULIN CONTROLLED GESTATIONAL DIABETES MELLITUS (GDM) IN THIRD TRIMESTER: ICD-10-CM

## 2023-05-08 DIAGNOSIS — R51.9 PREGNANCY HEADACHE IN THIRD TRIMESTER: ICD-10-CM

## 2023-05-08 PROCEDURE — 0502F SUBSEQUENT PRENATAL CARE: CPT | Performed by: OBSTETRICS & GYNECOLOGY

## 2023-05-08 PROCEDURE — 99213 OFFICE O/P EST LOW 20 MIN: CPT | Performed by: NURSE PRACTITIONER

## 2023-05-08 RX ORDER — CEFDINIR 300 MG/1
300 CAPSULE ORAL 2 TIMES DAILY
Qty: 14 CAPSULE | Refills: 0 | Status: ON HOLD | OUTPATIENT
Start: 2023-05-08 | End: 2023-05-15

## 2023-05-08 SDOH — ECONOMIC STABILITY: FOOD INSECURITY: WITHIN THE PAST 12 MONTHS, THE FOOD YOU BOUGHT JUST DIDN'T LAST AND YOU DIDN'T HAVE MONEY TO GET MORE.: NEVER TRUE

## 2023-05-08 SDOH — ECONOMIC STABILITY: FOOD INSECURITY: WITHIN THE PAST 12 MONTHS, YOU WORRIED THAT YOUR FOOD WOULD RUN OUT BEFORE YOU GOT MONEY TO BUY MORE.: NEVER TRUE

## 2023-05-08 SDOH — ECONOMIC STABILITY: INCOME INSECURITY: HOW HARD IS IT FOR YOU TO PAY FOR THE VERY BASICS LIKE FOOD, HOUSING, MEDICAL CARE, AND HEATING?: NOT HARD AT ALL

## 2023-05-08 ASSESSMENT — ENCOUNTER SYMPTOMS
VOMITING: 0
ABDOMINAL DISTENTION: 0
CHEST TIGHTNESS: 0
NAUSEA: 0
SHORTNESS OF BREATH: 1
SINUS PRESSURE: 1
WHEEZING: 1
RHINORRHEA: 1
CONSTIPATION: 0
SORE THROAT: 0
DIARRHEA: 0
COUGH: 1

## 2023-05-08 NOTE — PROGRESS NOTES
and pregnancy). Can use OTC tussin in low doses to reduce cough. Warm fluids with honey, humidifier to assist in moistening of secretion recommended. Encouraged increased nutrition/hydration/rest while recovering. No follow-ups on file. Patientshould call the office immediately with new or ongoing signs or symptoms or worsening, or proceed to the emergency room. If you are on medications which could impair your senses, you are at risk of weakness, falls,dizziness, or drowsiness. You should be careful during activities which could place you at risk of harm, such as climbing, using stairs, operating machinery, or driving vehicles. If you feel you cannot safely do theseactivities, you should request others to help you, or avoid the activities altogether. If you are drowsy for any other reason, you should use the same precautions as listed above. Call if pattern of symptoms change or persists for an extended time.       Della Escobedo

## 2023-05-08 NOTE — PROGRESS NOTES
Maternal emotional well being screening form completed and reviewed with patient.  Current score is 12

## 2023-05-08 NOTE — PROGRESS NOTES
Return OB Office Visit    CC:   Chief Complaint   Patient presents with    Routine Prenatal Visit    Pregnancy Ultrasound       HPI:  Patient seen and examined. Doing well today. Has had a cough over the weekend that is productive in nature. Has had negative home COVID tests. Is going to call PCP today. Denies VB, ctx. +FM. Reports leaking small amounts of fluid with coughing after voiding. Has had a stable floater in her peripheral vision. Reports fioricet was able to help with her headaches. Denies RUQ pain, increased LE edema. Denies chest pain, fever, chills, vomiting. Shortness of breath as well as nausea and vomiting are stable. Has an appt with Cardiology scheduled 5/11/2023. Review of Systems: The following ROS was otherwise negative, except as noted in the HPI: constitutional, HEENT, respiratory, cardiovascular, gastrointestinal, genitourinary, skin, musculoskeletal, neurological, psych    Objective:  /70   Pulse (!) 122   Temp 97.2 °F (36.2 °C) (Infrared)   Wt (!) 343 lb (155.6 kg)   LMP 08/26/2022 (Exact Date)   BMI 53.72 kg/m²     Physical Exam  Vitals reviewed. Constitutional:       General: She is not in acute distress. Appearance: She is well-developed. HENT:      Head: Normocephalic and atraumatic. Eyes:      Conjunctiva/sclera: Conjunctivae normal.   Cardiovascular:      Rate and Rhythm: Normal rate. Pulmonary:      Effort: Pulmonary effort is normal. No respiratory distress. Abdominal:      General: There is no distension. Palpations: Abdomen is soft. Tenderness: There is no abdominal tenderness. There is no guarding or rebound. Genitourinary:     General: Normal vulva. Vagina: No vaginal discharge. Comments: Negative ferning, pooling, nitrazine  Musculoskeletal:         General: No swelling. Skin:     General: Skin is warm and dry. Neurological:      Mental Status: She is alert and oriented to person, place, and time.

## 2023-05-11 ENCOUNTER — OFFICE VISIT (OUTPATIENT)
Dept: CARDIOLOGY CLINIC | Age: 38
End: 2023-05-11

## 2023-05-11 VITALS
OXYGEN SATURATION: 98 % | SYSTOLIC BLOOD PRESSURE: 112 MMHG | WEIGHT: 293 LBS | DIASTOLIC BLOOD PRESSURE: 70 MMHG | BODY MASS INDEX: 45.99 KG/M2 | HEART RATE: 100 BPM | HEIGHT: 67 IN

## 2023-05-11 DIAGNOSIS — R93.1 ABNORMAL ECHOCARDIOGRAM: ICD-10-CM

## 2023-05-11 DIAGNOSIS — R06.02 SOB (SHORTNESS OF BREATH): Primary | ICD-10-CM

## 2023-05-11 DIAGNOSIS — I35.1 NONRHEUMATIC AORTIC VALVE INSUFFICIENCY: ICD-10-CM

## 2023-05-11 NOTE — PROGRESS NOTES
[address]   Cardiac Consultation    Referring Provider:  Leticia Villatoro MD     Chief Complaint   Patient presents with    New Patient     Pt is 36 weeks pregnant    Shortness of Breath     With activity and rest    Results     echo      Justice Ruiz   1985    History of Present Illness:   Justice Ruiz is a 30 y.o. female who is here today as a new patient consult at the request of Dr. Ed Garza for an abnormal echocardiogram. Only occasional smoking while in college. Today she states she is currently 36 weeks and 6 days pregnant with her second child. She states she has a family history of heart diease in her grandfather that  in his 52's. Indication for her echocardiogram was shortness of breath even at rest. States she went to the ER this past April due to her shortness of breath, developed chest pain during this visit but has o/w had no chest pain. She states SOB started during her second trimester, more noticeable when laying flat. Not exertional. Mild to moderate severity. She had similar symptoms with her first pregnancy and had to sit up to sleep. All resolved after she delivered. She had an echocardiogram during this pregnancy as well. SOB has decreased since the baby started sitting lower. She will be having a  next Friday. She states her blood pressure has been well controlled, no swelling. Patient currently denies any weight gain, edema, palpitations, dizziness, and syncope. Past Medical History:   has a past medical history of Abnormal Pap smear of cervix, Anemia, Anxiety, Crohn's colitis ([address] Depression, Herniated disc, Hx of blood clots, Insulin controlled gestational diabetes mellitus (GDM) in third trimester, and SAB (spontaneous ). Surgical History:   has a past surgical history that includes Ectopic pregnancy surgery; Ectopic pregnancy surgery ();  section;  Cholecystectomy, laparoscopic (N/A, 2017); ERCP; and

## 2023-05-11 NOTE — PATIENT INSTRUCTIONS
Plan:  ~Recommend GXT Myoview stress test to evaluate shortness of breath and abnormal echocardiogram- to be dome a few week after delivery   Cardiac medications reviewed including indications and pertinent side effects. Medication list updated at this visit. Check blood pressure and heart rate at home a few times per week- keep a log with dates and times and bring to office visit   Regular exercise and following a healthy diet encouraged   Follow up with me after stress test   Call if symptoms do not improve after delivery     Your provider has ordered testing for further evaluation. An order/prescription has been included in your paper work. To schedule outpatient testing, contact Central Scheduling by calling [address] [place] [address] ([phone]).

## 2023-05-12 ENCOUNTER — TELEPHONE (OUTPATIENT)
Dept: OBGYN CLINIC | Age: 38
End: 2023-05-12

## 2023-05-12 ENCOUNTER — ANESTHESIA EVENT (OUTPATIENT)
Dept: LABOR AND DELIVERY | Age: 38
End: 2023-05-12
Payer: COMMERCIAL

## 2023-05-12 ENCOUNTER — ROUTINE PRENATAL (OUTPATIENT)
Dept: OBGYN CLINIC | Age: 38
End: 2023-05-12

## 2023-05-12 ENCOUNTER — HOSPITAL ENCOUNTER (INPATIENT)
Age: 38
LOS: 3 days | Discharge: HOME | End: 2023-05-15
Attending: OBSTETRICS & GYNECOLOGY | Admitting: OBSTETRICS & GYNECOLOGY
Payer: COMMERCIAL

## 2023-05-12 ENCOUNTER — ANESTHESIA (OUTPATIENT)
Dept: LABOR AND DELIVERY | Age: 38
End: 2023-05-12
Payer: COMMERCIAL

## 2023-05-12 VITALS
BODY MASS INDEX: 53.91 KG/M2 | HEART RATE: 110 BPM | TEMPERATURE: 97.3 F | WEIGHT: 293 LBS | SYSTOLIC BLOOD PRESSURE: 118 MMHG | DIASTOLIC BLOOD PRESSURE: 88 MMHG

## 2023-05-12 DIAGNOSIS — F41.1 GAD (GENERALIZED ANXIETY DISORDER): ICD-10-CM

## 2023-05-12 DIAGNOSIS — K50.90 CROHN'S DISEASE WITHOUT COMPLICATION, UNSPECIFIED GASTROINTESTINAL TRACT LOCATION (HCC): ICD-10-CM

## 2023-05-12 DIAGNOSIS — I35.1 NONRHEUMATIC AORTIC VALVE INSUFFICIENCY: ICD-10-CM

## 2023-05-12 DIAGNOSIS — Z87.59 HISTORY OF MISCARRIAGE: ICD-10-CM

## 2023-05-12 DIAGNOSIS — E66.01 MORBID OBESITY (HCC): ICD-10-CM

## 2023-05-12 DIAGNOSIS — O09.523 MULTIGRAVIDA OF ADVANCED MATERNAL AGE IN THIRD TRIMESTER: ICD-10-CM

## 2023-05-12 DIAGNOSIS — Z86.32 HISTORY OF GESTATIONAL DIABETES: ICD-10-CM

## 2023-05-12 DIAGNOSIS — R93.1 ABNORMAL ECHOCARDIOGRAM: ICD-10-CM

## 2023-05-12 DIAGNOSIS — R06.02 SHORTNESS OF BREATH DURING PREGNANCY: ICD-10-CM

## 2023-05-12 DIAGNOSIS — O26.893 PREGNANCY HEADACHE IN THIRD TRIMESTER: ICD-10-CM

## 2023-05-12 DIAGNOSIS — O40.3XX0 POLYHYDRAMNIOS IN THIRD TRIMESTER COMPLICATION, SINGLE OR UNSPECIFIED FETUS: ICD-10-CM

## 2023-05-12 DIAGNOSIS — Z34.83 PRENATAL CARE, SUBSEQUENT PREGNANCY IN THIRD TRIMESTER: Primary | ICD-10-CM

## 2023-05-12 DIAGNOSIS — O24.414 INSULIN CONTROLLED GESTATIONAL DIABETES MELLITUS (GDM) IN THIRD TRIMESTER: ICD-10-CM

## 2023-05-12 DIAGNOSIS — K50.119 CROHN'S DISEASE OF COLON WITH COMPLICATION (HCC): ICD-10-CM

## 2023-05-12 DIAGNOSIS — O99.891 SHORTNESS OF BREATH DURING PREGNANCY: ICD-10-CM

## 2023-05-12 DIAGNOSIS — O99.810 IMPAIRED GLUCOSE TOLERANCE DURING PREGNANCY: ICD-10-CM

## 2023-05-12 DIAGNOSIS — D64.9 ANEMIA, UNSPECIFIED TYPE: ICD-10-CM

## 2023-05-12 DIAGNOSIS — R51.9 PREGNANCY HEADACHE IN THIRD TRIMESTER: ICD-10-CM

## 2023-05-12 DIAGNOSIS — Z86.711 HISTORY OF PULMONARY EMBOLUS (PE): ICD-10-CM

## 2023-05-12 DIAGNOSIS — O99.210 MATERNAL OBESITY AFFECTING PREGNANCY, ANTEPARTUM: ICD-10-CM

## 2023-05-12 DIAGNOSIS — Z98.891 HISTORY OF CESAREAN DELIVERY: ICD-10-CM

## 2023-05-12 DIAGNOSIS — Z98.891 S/P REPEAT LOW TRANSVERSE C-SECTION: Primary | ICD-10-CM

## 2023-05-12 DIAGNOSIS — Z87.59 HISTORY OF ECTOPIC PREGNANCY: ICD-10-CM

## 2023-05-12 DIAGNOSIS — E66.01 MORBID OBESITY WITH BMI OF 50.0-59.9, ADULT (HCC): ICD-10-CM

## 2023-05-12 PROBLEM — O42.90 FULL-TERM PREMATURE RUPTURE OF MEMBRANES (PROM) WITH UNKNOWN ONSET OF LABOR: Status: ACTIVE | Noted: 2023-05-12

## 2023-05-12 LAB
ABO + RH BLD: NORMAL
AMPHETAMINES UR QL SCN>1000 NG/ML: ABNORMAL
BARBITURATES UR QL SCN>200 NG/ML: POSITIVE
BASOPHILS # BLD: 0 K/UL (ref 0–0.2)
BASOPHILS NFR BLD: 0.3 %
BENZODIAZ UR QL SCN>200 NG/ML: ABNORMAL
BLD GP AB SCN SERPL QL: NORMAL
BUPRENORPHINE+NOR UR QL SCN: ABNORMAL
CANNABINOIDS UR QL SCN>50 NG/ML: ABNORMAL
COCAINE UR QL SCN: ABNORMAL
DEPRECATED RDW RBC AUTO: 17.8 % (ref 12.4–15.4)
DRUG SCREEN COMMENT UR-IMP: ABNORMAL
EOSINOPHIL # BLD: 0.1 K/UL (ref 0–0.6)
EOSINOPHIL NFR BLD: 1.4 %
FENTANYL SCREEN, URINE: ABNORMAL
GLUCOSE BLD-MCNC: 131 MG/DL (ref 70–99)
HCT VFR BLD AUTO: 32.2 % (ref 36–48)
HGB BLD-MCNC: 10.8 G/DL (ref 12–16)
LYMPHOCYTES # BLD: 1.3 K/UL (ref 1–5.1)
LYMPHOCYTES NFR BLD: 12.4 %
MCH RBC QN AUTO: 28.1 PG (ref 26–34)
MCHC RBC AUTO-ENTMCNC: 33.6 G/DL (ref 31–36)
MCV RBC AUTO: 83.8 FL (ref 80–100)
METHADONE UR QL SCN>300 NG/ML: ABNORMAL
MONOCYTES # BLD: 0.3 K/UL (ref 0–1.3)
MONOCYTES NFR BLD: 2.6 %
NEUTROPHILS # BLD: 8.7 K/UL (ref 1.7–7.7)
NEUTROPHILS NFR BLD: 83.3 %
OPIATES UR QL SCN>300 NG/ML: ABNORMAL
OXYCODONE UR QL SCN: ABNORMAL
PCP UR QL SCN>25 NG/ML: ABNORMAL
PERFORMED ON: ABNORMAL
PH UR STRIP: 6.5 [PH]
PLATELET # BLD AUTO: 199 K/UL (ref 135–450)
PMV BLD AUTO: 6.6 FL (ref 5–10.5)
RBC # BLD AUTO: 3.84 M/UL (ref 4–5.2)
WBC # BLD AUTO: 10.4 K/UL (ref 4–11)

## 2023-05-12 PROCEDURE — 2500000003 HC RX 250 WO HCPCS: Performed by: NURSE ANESTHETIST, CERTIFIED REGISTERED

## 2023-05-12 PROCEDURE — 3609079900 HC CESAREAN SECTION: Performed by: OBSTETRICS & GYNECOLOGY

## 2023-05-12 PROCEDURE — 86780 TREPONEMA PALLIDUM: CPT

## 2023-05-12 PROCEDURE — 7100000000 HC PACU RECOVERY - FIRST 15 MIN: Performed by: OBSTETRICS & GYNECOLOGY

## 2023-05-12 PROCEDURE — 6360000002 HC RX W HCPCS: Performed by: NURSE ANESTHETIST, CERTIFIED REGISTERED

## 2023-05-12 PROCEDURE — 86900 BLOOD TYPING SEROLOGIC ABO: CPT

## 2023-05-12 PROCEDURE — 3700000001 HC ADD 15 MINUTES (ANESTHESIA): Performed by: OBSTETRICS & GYNECOLOGY

## 2023-05-12 PROCEDURE — 86850 RBC ANTIBODY SCREEN: CPT

## 2023-05-12 PROCEDURE — 85025 COMPLETE CBC W/AUTO DIFF WBC: CPT

## 2023-05-12 PROCEDURE — 2709999900 HC NON-CHARGEABLE SUPPLY: Performed by: OBSTETRICS & GYNECOLOGY

## 2023-05-12 PROCEDURE — 80307 DRUG TEST PRSMV CHEM ANLYZR: CPT

## 2023-05-12 PROCEDURE — 6360000002 HC RX W HCPCS: Performed by: OBSTETRICS & GYNECOLOGY

## 2023-05-12 PROCEDURE — 3700000000 HC ANESTHESIA ATTENDED CARE: Performed by: OBSTETRICS & GYNECOLOGY

## 2023-05-12 PROCEDURE — 59510 CESAREAN DELIVERY: CPT | Performed by: OBSTETRICS & GYNECOLOGY

## 2023-05-12 PROCEDURE — 6370000000 HC RX 637 (ALT 250 FOR IP): Performed by: OBSTETRICS & GYNECOLOGY

## 2023-05-12 PROCEDURE — 7100000001 HC PACU RECOVERY - ADDTL 15 MIN: Performed by: OBSTETRICS & GYNECOLOGY

## 2023-05-12 PROCEDURE — 2580000003 HC RX 258: Performed by: OBSTETRICS & GYNECOLOGY

## 2023-05-12 PROCEDURE — 1220000000 HC SEMI PRIVATE OB R&B

## 2023-05-12 PROCEDURE — 86901 BLOOD TYPING SEROLOGIC RH(D): CPT

## 2023-05-12 RX ORDER — EPHEDRINE SULFATE 50 MG/ML
INJECTION INTRAVENOUS PRN
Status: DISCONTINUED | OUTPATIENT
Start: 2023-05-12 | End: 2023-05-12 | Stop reason: SDUPTHER

## 2023-05-12 RX ORDER — FERROUS SULFATE 325(65) MG
325 TABLET ORAL
Status: DISCONTINUED | OUTPATIENT
Start: 2023-05-13 | End: 2023-05-15 | Stop reason: HOSPADM

## 2023-05-12 RX ORDER — CEPHALEXIN 250 MG/1
500 CAPSULE ORAL EVERY 8 HOURS SCHEDULED
Status: DISCONTINUED | OUTPATIENT
Start: 2023-05-12 | End: 2023-05-13

## 2023-05-12 RX ORDER — SODIUM CHLORIDE 9 MG/ML
INJECTION, SOLUTION INTRAVENOUS PRN
Status: DISCONTINUED | OUTPATIENT
Start: 2023-05-12 | End: 2023-05-12

## 2023-05-12 RX ORDER — SODIUM CHLORIDE, SODIUM LACTATE, POTASSIUM CHLORIDE, CALCIUM CHLORIDE 600; 310; 30; 20 MG/100ML; MG/100ML; MG/100ML; MG/100ML
INJECTION, SOLUTION INTRAVENOUS CONTINUOUS
Status: DISCONTINUED | OUTPATIENT
Start: 2023-05-12 | End: 2023-05-12

## 2023-05-12 RX ORDER — IBUPROFEN 800 MG/1
800 TABLET ORAL EVERY 8 HOURS PRN
Status: DISCONTINUED | OUTPATIENT
Start: 2023-05-12 | End: 2023-05-15 | Stop reason: HOSPADM

## 2023-05-12 RX ORDER — FENTANYL CITRATE 50 UG/ML
INJECTION, SOLUTION INTRAMUSCULAR; INTRAVENOUS PRN
Status: DISCONTINUED | OUTPATIENT
Start: 2023-05-12 | End: 2023-05-12 | Stop reason: SDUPTHER

## 2023-05-12 RX ORDER — ESCITALOPRAM OXALATE 10 MG/1
10 TABLET ORAL DAILY
Status: DISCONTINUED | OUTPATIENT
Start: 2023-05-13 | End: 2023-05-15 | Stop reason: HOSPADM

## 2023-05-12 RX ORDER — ACETAMINOPHEN 500 MG
1000 TABLET ORAL EVERY 8 HOURS PRN
Status: DISCONTINUED | OUTPATIENT
Start: 2023-05-12 | End: 2023-05-15 | Stop reason: HOSPADM

## 2023-05-12 RX ORDER — SODIUM CHLORIDE 0.9 % (FLUSH) 0.9 %
10 SYRINGE (ML) INJECTION EVERY 12 HOURS SCHEDULED
Status: DISCONTINUED | OUTPATIENT
Start: 2023-05-12 | End: 2023-05-12

## 2023-05-12 RX ORDER — LANOLIN 100 %
OINTMENT (GRAM) TOPICAL
Status: DISCONTINUED | OUTPATIENT
Start: 2023-05-12 | End: 2023-05-15 | Stop reason: HOSPADM

## 2023-05-12 RX ORDER — SODIUM CHLORIDE 0.9 % (FLUSH) 0.9 %
5-40 SYRINGE (ML) INJECTION PRN
Status: DISCONTINUED | OUTPATIENT
Start: 2023-05-12 | End: 2023-05-15 | Stop reason: HOSPADM

## 2023-05-12 RX ORDER — PRENATAL WITH FERROUS FUM AND FOLIC ACID 3080; 920; 120; 400; 22; 1.84; 3; 20; 10; 1; 12; 200; 27; 25; 2 [IU]/1; [IU]/1; MG/1; [IU]/1; MG/1; MG/1; MG/1; MG/1; MG/1; MG/1; UG/1; MG/1; MG/1; MG/1; MG/1
1 TABLET ORAL DAILY
Status: DISCONTINUED | OUTPATIENT
Start: 2023-05-13 | End: 2023-05-15 | Stop reason: HOSPADM

## 2023-05-12 RX ORDER — OXYCODONE HYDROCHLORIDE 5 MG/1
10 TABLET ORAL EVERY 4 HOURS PRN
Status: DISCONTINUED | OUTPATIENT
Start: 2023-05-12 | End: 2023-05-15 | Stop reason: HOSPADM

## 2023-05-12 RX ORDER — ONDANSETRON 2 MG/ML
INJECTION INTRAMUSCULAR; INTRAVENOUS PRN
Status: DISCONTINUED | OUTPATIENT
Start: 2023-05-12 | End: 2023-05-12 | Stop reason: SDUPTHER

## 2023-05-12 RX ORDER — ENOXAPARIN SODIUM 100 MG/ML
40 INJECTION SUBCUTANEOUS 2 TIMES DAILY
Status: DISCONTINUED | OUTPATIENT
Start: 2023-05-13 | End: 2023-05-15 | Stop reason: HOSPADM

## 2023-05-12 RX ORDER — METRONIDAZOLE 250 MG/1
500 TABLET ORAL EVERY 8 HOURS SCHEDULED
Status: DISCONTINUED | OUTPATIENT
Start: 2023-05-12 | End: 2023-05-13

## 2023-05-12 RX ORDER — ONDANSETRON 4 MG/1
4 TABLET, ORALLY DISINTEGRATING ORAL EVERY 8 HOURS PRN
Status: DISCONTINUED | OUTPATIENT
Start: 2023-05-12 | End: 2023-05-15 | Stop reason: HOSPADM

## 2023-05-12 RX ORDER — SODIUM CHLORIDE 0.9 % (FLUSH) 0.9 %
10 SYRINGE (ML) INJECTION PRN
Status: DISCONTINUED | OUTPATIENT
Start: 2023-05-12 | End: 2023-05-12

## 2023-05-12 RX ORDER — KETOROLAC TROMETHAMINE 30 MG/ML
30 INJECTION, SOLUTION INTRAMUSCULAR; INTRAVENOUS ONCE
Status: COMPLETED | OUTPATIENT
Start: 2023-05-12 | End: 2023-05-12

## 2023-05-12 RX ORDER — OXYCODONE HYDROCHLORIDE 5 MG/1
5 TABLET ORAL EVERY 4 HOURS PRN
Status: DISCONTINUED | OUTPATIENT
Start: 2023-05-12 | End: 2023-05-15 | Stop reason: HOSPADM

## 2023-05-12 RX ORDER — SODIUM CHLORIDE, SODIUM LACTATE, POTASSIUM CHLORIDE, AND CALCIUM CHLORIDE .6; .31; .03; .02 G/100ML; G/100ML; G/100ML; G/100ML
1000 INJECTION, SOLUTION INTRAVENOUS ONCE
Status: DISCONTINUED | OUTPATIENT
Start: 2023-05-12 | End: 2023-05-12

## 2023-05-12 RX ORDER — GLUCOSAMINE HCL/CHONDROITIN SU 500-400 MG
CAPSULE ORAL
Qty: 100 STRIP | Refills: 0 | Status: ON HOLD | OUTPATIENT
Start: 2023-05-12

## 2023-05-12 RX ORDER — MORPHINE SULFATE 0.5 MG/ML
INJECTION, SOLUTION EPIDURAL; INTRATHECAL; INTRAVENOUS PRN
Status: DISCONTINUED | OUTPATIENT
Start: 2023-05-12 | End: 2023-05-12 | Stop reason: SDUPTHER

## 2023-05-12 RX ORDER — SODIUM CHLORIDE 9 MG/ML
INJECTION, SOLUTION INTRAVENOUS PRN
Status: DISCONTINUED | OUTPATIENT
Start: 2023-05-12 | End: 2023-05-15 | Stop reason: HOSPADM

## 2023-05-12 RX ORDER — BUPIVACAINE HYDROCHLORIDE 7.5 MG/ML
INJECTION, SOLUTION INTRASPINAL PRN
Status: DISCONTINUED | OUTPATIENT
Start: 2023-05-12 | End: 2023-05-12 | Stop reason: SDUPTHER

## 2023-05-12 RX ORDER — SIMETHICONE 80 MG
80 TABLET,CHEWABLE ORAL EVERY 6 HOURS PRN
Status: DISCONTINUED | OUTPATIENT
Start: 2023-05-12 | End: 2023-05-15 | Stop reason: HOSPADM

## 2023-05-12 RX ORDER — DOCUSATE SODIUM 100 MG/1
100 CAPSULE, LIQUID FILLED ORAL 2 TIMES DAILY
Status: DISCONTINUED | OUTPATIENT
Start: 2023-05-12 | End: 2023-05-15 | Stop reason: HOSPADM

## 2023-05-12 RX ORDER — OXYTOCIN 10 [USP'U]/ML
INJECTION, SOLUTION INTRAMUSCULAR; INTRAVENOUS PRN
Status: DISCONTINUED | OUTPATIENT
Start: 2023-05-12 | End: 2023-05-12 | Stop reason: SDUPTHER

## 2023-05-12 RX ORDER — MIDAZOLAM HYDROCHLORIDE 1 MG/ML
INJECTION INTRAMUSCULAR; INTRAVENOUS PRN
Status: DISCONTINUED | OUTPATIENT
Start: 2023-05-12 | End: 2023-05-12 | Stop reason: SDUPTHER

## 2023-05-12 RX ORDER — SODIUM CHLORIDE, SODIUM LACTATE, POTASSIUM CHLORIDE, CALCIUM CHLORIDE 600; 310; 30; 20 MG/100ML; MG/100ML; MG/100ML; MG/100ML
INJECTION, SOLUTION INTRAVENOUS CONTINUOUS
Status: DISCONTINUED | OUTPATIENT
Start: 2023-05-12 | End: 2023-05-15 | Stop reason: HOSPADM

## 2023-05-12 RX ORDER — FAMOTIDINE 10 MG/ML
INJECTION, SOLUTION INTRAVENOUS PRN
Status: DISCONTINUED | OUTPATIENT
Start: 2023-05-12 | End: 2023-05-12 | Stop reason: SDUPTHER

## 2023-05-12 RX ORDER — MORPHINE SULFATE 10 MG/ML
INJECTION, SOLUTION INTRAMUSCULAR; INTRAVENOUS PRN
Status: DISCONTINUED | OUTPATIENT
Start: 2023-05-12 | End: 2023-05-12 | Stop reason: SDUPTHER

## 2023-05-12 RX ORDER — FAMOTIDINE 10 MG/ML
INJECTION, SOLUTION INTRAVENOUS
Status: COMPLETED
Start: 2023-05-12 | End: 2023-05-12

## 2023-05-12 RX ORDER — SODIUM CHLORIDE 0.9 % (FLUSH) 0.9 %
5-40 SYRINGE (ML) INJECTION EVERY 12 HOURS SCHEDULED
Status: DISCONTINUED | OUTPATIENT
Start: 2023-05-12 | End: 2023-05-15 | Stop reason: HOSPADM

## 2023-05-12 RX ADMIN — OXYTOCIN 20 UNITS: 10 INJECTION INTRAVENOUS at 20:10

## 2023-05-12 RX ADMIN — FENTANYL CITRATE 100 MCG: 50 INJECTION, SOLUTION INTRAMUSCULAR; INTRAVENOUS at 20:10

## 2023-05-12 RX ADMIN — BUPIVACAINE HYDROCHLORIDE 1.7 ML: 7.5 INJECTION, SOLUTION SUBARACHNOID at 19:30

## 2023-05-12 RX ADMIN — CEPHALEXIN 500 MG: 250 CAPSULE ORAL at 23:59

## 2023-05-12 RX ADMIN — SODIUM CHLORIDE, POTASSIUM CHLORIDE, SODIUM LACTATE AND CALCIUM CHLORIDE: 600; 310; 30; 20 INJECTION, SOLUTION INTRAVENOUS at 19:23

## 2023-05-12 RX ADMIN — EPHEDRINE SULFATE 10 MG: 50 INJECTION INTRAVENOUS at 19:49

## 2023-05-12 RX ADMIN — SODIUM CHLORIDE, POTASSIUM CHLORIDE, SODIUM LACTATE AND CALCIUM CHLORIDE: 600; 310; 30; 20 INJECTION, SOLUTION INTRAVENOUS at 21:07

## 2023-05-12 RX ADMIN — ONDANSETRON 4 MG: 2 INJECTION INTRAMUSCULAR; INTRAVENOUS at 19:48

## 2023-05-12 RX ADMIN — MORPHINE SULFATE 1 MG: 10 INJECTION, SOLUTION INTRAMUSCULAR; INTRAVENOUS at 20:24

## 2023-05-12 RX ADMIN — MIDAZOLAM 2 MG: 1 INJECTION INTRAMUSCULAR; INTRAVENOUS at 20:40

## 2023-05-12 RX ADMIN — MIDAZOLAM 2 MG: 1 INJECTION INTRAMUSCULAR; INTRAVENOUS at 20:10

## 2023-05-12 RX ADMIN — MORPHINE SULFATE 1 MG: 10 INJECTION, SOLUTION INTRAMUSCULAR; INTRAVENOUS at 20:41

## 2023-05-12 RX ADMIN — MIDAZOLAM 2 MG: 1 INJECTION INTRAMUSCULAR; INTRAVENOUS at 20:24

## 2023-05-12 RX ADMIN — FAMOTIDINE 20 MG: 10 INJECTION, SOLUTION INTRAVENOUS at 19:24

## 2023-05-12 RX ADMIN — SODIUM CHLORIDE, POTASSIUM CHLORIDE, SODIUM LACTATE AND CALCIUM CHLORIDE: 600; 310; 30; 20 INJECTION, SOLUTION INTRAVENOUS at 21:14

## 2023-05-12 RX ADMIN — Medication 3000 MG: at 19:23

## 2023-05-12 RX ADMIN — SODIUM CHLORIDE, POTASSIUM CHLORIDE, SODIUM LACTATE AND CALCIUM CHLORIDE: 600; 310; 30; 20 INJECTION, SOLUTION INTRAVENOUS at 16:15

## 2023-05-12 RX ADMIN — METRONIDAZOLE 500 MG: 250 TABLET ORAL at 23:58

## 2023-05-12 RX ADMIN — MORPHINE SULFATE 0.15 MG: 0.5 INJECTION EPIDURAL; INTRATHECAL; INTRAVENOUS at 19:30

## 2023-05-12 RX ADMIN — KETOROLAC TROMETHAMINE 30 MG: 30 INJECTION, SOLUTION INTRAMUSCULAR at 22:11

## 2023-05-12 ASSESSMENT — PAIN DESCRIPTION - ORIENTATION: ORIENTATION: LOWER

## 2023-05-12 ASSESSMENT — PAIN DESCRIPTION - LOCATION: LOCATION: ABDOMEN

## 2023-05-12 ASSESSMENT — PAIN SCALES - GENERAL: PAINLEVEL_OUTOF10: 5

## 2023-05-12 ASSESSMENT — PAIN DESCRIPTION - DESCRIPTORS: DESCRIPTORS: CRAMPING

## 2023-05-12 ASSESSMENT — ENCOUNTER SYMPTOMS: SHORTNESS OF BREATH: 1

## 2023-05-12 ASSESSMENT — PAIN - FUNCTIONAL ASSESSMENT: PAIN_FUNCTIONAL_ASSESSMENT: ACTIVITIES ARE NOT PREVENTED

## 2023-05-12 NOTE — TELEPHONE ENCOUNTER
Patient called to report her water broke as she just woke up from a nap. Patient is about 30-45 minutes out as she is waiting for her mom to drive her. Spoke with Honey Galvan on L&D, she is aware she was scheduled for  on 23, group B positive, and second . Routing to on call Dr. Pushpa Carlin.

## 2023-05-12 NOTE — PROGRESS NOTES
Maternal emotional well being screening form completed and reviewed with patient.  Current score is 5

## 2023-05-13 LAB
DEPRECATED RDW RBC AUTO: 17.8 % (ref 12.4–15.4)
GLUCOSE BLD-MCNC: 86 MG/DL (ref 70–99)
HCT VFR BLD AUTO: 30.9 % (ref 36–48)
HGB BLD-MCNC: 10 G/DL (ref 12–16)
MCH RBC QN AUTO: 27.4 PG (ref 26–34)
MCHC RBC AUTO-ENTMCNC: 32.4 G/DL (ref 31–36)
MCV RBC AUTO: 84.3 FL (ref 80–100)
PERFORMED ON: NORMAL
PLATELET # BLD AUTO: 171 K/UL (ref 135–450)
PMV BLD AUTO: 6.6 FL (ref 5–10.5)
RBC # BLD AUTO: 3.67 M/UL (ref 4–5.2)
REAGIN+T PALLIDUM IGG+IGM SERPL-IMP: NORMAL
WBC # BLD AUTO: 12.5 K/UL (ref 4–11)

## 2023-05-13 PROCEDURE — 85027 COMPLETE CBC AUTOMATED: CPT

## 2023-05-13 PROCEDURE — 6360000002 HC RX W HCPCS: Performed by: OBSTETRICS & GYNECOLOGY

## 2023-05-13 PROCEDURE — 6370000000 HC RX 637 (ALT 250 FOR IP): Performed by: OBSTETRICS & GYNECOLOGY

## 2023-05-13 PROCEDURE — 36415 COLL VENOUS BLD VENIPUNCTURE: CPT

## 2023-05-13 PROCEDURE — 1220000000 HC SEMI PRIVATE OB R&B

## 2023-05-13 RX ORDER — METRONIDAZOLE 250 MG/1
500 TABLET ORAL EVERY 8 HOURS SCHEDULED
Status: COMPLETED | OUTPATIENT
Start: 2023-05-13 | End: 2023-05-14

## 2023-05-13 RX ORDER — CEPHALEXIN 250 MG/1
500 CAPSULE ORAL EVERY 8 HOURS SCHEDULED
Status: COMPLETED | OUTPATIENT
Start: 2023-05-13 | End: 2023-05-14

## 2023-05-13 RX ADMIN — IBUPROFEN 800 MG: 800 TABLET, FILM COATED ORAL at 17:14

## 2023-05-13 RX ADMIN — ENOXAPARIN SODIUM 40 MG: 100 INJECTION SUBCUTANEOUS at 09:01

## 2023-05-13 RX ADMIN — DOCUSATE SODIUM 100 MG: 100 CAPSULE, LIQUID FILLED ORAL at 21:01

## 2023-05-13 RX ADMIN — METRONIDAZOLE 500 MG: 250 TABLET ORAL at 17:15

## 2023-05-13 RX ADMIN — OXYCODONE 5 MG: 5 TABLET ORAL at 05:18

## 2023-05-13 RX ADMIN — ENOXAPARIN SODIUM 40 MG: 100 INJECTION SUBCUTANEOUS at 21:02

## 2023-05-13 RX ADMIN — ACETAMINOPHEN 1000 MG: 500 TABLET ORAL at 12:51

## 2023-05-13 RX ADMIN — OXYCODONE 10 MG: 5 TABLET ORAL at 14:07

## 2023-05-13 RX ADMIN — METFORMIN HYDROCHLORIDE 1 TABLET: 500 TABLET, EXTENDED RELEASE ORAL at 09:00

## 2023-05-13 RX ADMIN — OXYCODONE 10 MG: 5 TABLET ORAL at 09:36

## 2023-05-13 RX ADMIN — ACETAMINOPHEN 1000 MG: 500 TABLET ORAL at 01:49

## 2023-05-13 RX ADMIN — CEPHALEXIN 500 MG: 250 CAPSULE ORAL at 09:00

## 2023-05-13 RX ADMIN — ACETAMINOPHEN 1000 MG: 500 TABLET ORAL at 21:01

## 2023-05-13 RX ADMIN — METRONIDAZOLE 500 MG: 250 TABLET ORAL at 09:01

## 2023-05-13 RX ADMIN — FERROUS SULFATE TAB 325 MG (65 MG ELEMENTAL FE) 325 MG: 325 (65 FE) TAB at 09:00

## 2023-05-13 RX ADMIN — IBUPROFEN 800 MG: 800 TABLET, FILM COATED ORAL at 06:01

## 2023-05-13 RX ADMIN — DOCUSATE SODIUM 100 MG: 100 CAPSULE, LIQUID FILLED ORAL at 09:01

## 2023-05-13 RX ADMIN — ESCITALOPRAM 10 MG: 10 TABLET, FILM COATED ORAL at 09:01

## 2023-05-13 RX ADMIN — CEPHALEXIN 500 MG: 250 CAPSULE ORAL at 17:14

## 2023-05-13 ASSESSMENT — PAIN SCALES - GENERAL
PAINLEVEL_OUTOF10: 6
PAINLEVEL_OUTOF10: 5
PAINLEVEL_OUTOF10: 4
PAINLEVEL_OUTOF10: 7
PAINLEVEL_OUTOF10: 4
PAINLEVEL_OUTOF10: 4

## 2023-05-13 ASSESSMENT — PAIN DESCRIPTION - LOCATION
LOCATION: ABDOMEN
LOCATION: ABDOMEN;INCISION
LOCATION: ABDOMEN
LOCATION: ABDOMEN
LOCATION: ABDOMEN;INCISION
LOCATION: ABDOMEN
LOCATION: ABDOMEN;INCISION
LOCATION: ABDOMEN

## 2023-05-13 ASSESSMENT — PAIN DESCRIPTION - DESCRIPTORS
DESCRIPTORS: DISCOMFORT
DESCRIPTORS: DISCOMFORT;STABBING
DESCRIPTORS: DISCOMFORT
DESCRIPTORS: CRAMPING;STABBING

## 2023-05-13 ASSESSMENT — PAIN DESCRIPTION - ORIENTATION
ORIENTATION: LOWER

## 2023-05-13 ASSESSMENT — PAIN - FUNCTIONAL ASSESSMENT
PAIN_FUNCTIONAL_ASSESSMENT: ACTIVITIES ARE NOT PREVENTED

## 2023-05-14 PROBLEM — Z86.32 HISTORY OF GESTATIONAL DIABETES: Status: RESOLVED | Noted: 2022-10-12 | Resolved: 2023-05-14

## 2023-05-14 PROBLEM — E66.01 MORBID OBESITY WITH BMI OF 50.0-59.9, ADULT (HCC): Status: ACTIVE | Noted: 2022-10-12

## 2023-05-14 PROCEDURE — 6370000000 HC RX 637 (ALT 250 FOR IP): Performed by: OBSTETRICS & GYNECOLOGY

## 2023-05-14 PROCEDURE — 6360000002 HC RX W HCPCS: Performed by: OBSTETRICS & GYNECOLOGY

## 2023-05-14 PROCEDURE — 99024 POSTOP FOLLOW-UP VISIT: CPT | Performed by: OBSTETRICS & GYNECOLOGY

## 2023-05-14 PROCEDURE — 1220000000 HC SEMI PRIVATE OB R&B

## 2023-05-14 RX ADMIN — ACETAMINOPHEN 1000 MG: 500 TABLET ORAL at 04:48

## 2023-05-14 RX ADMIN — DOCUSATE SODIUM 100 MG: 100 CAPSULE, LIQUID FILLED ORAL at 09:27

## 2023-05-14 RX ADMIN — CEPHALEXIN 500 MG: 250 CAPSULE ORAL at 01:26

## 2023-05-14 RX ADMIN — CEPHALEXIN 500 MG: 250 CAPSULE ORAL at 09:27

## 2023-05-14 RX ADMIN — METRONIDAZOLE 500 MG: 250 TABLET ORAL at 01:26

## 2023-05-14 RX ADMIN — OXYCODONE 5 MG: 5 TABLET ORAL at 00:49

## 2023-05-14 RX ADMIN — METRONIDAZOLE 500 MG: 250 TABLET ORAL at 09:27

## 2023-05-14 RX ADMIN — IBUPROFEN 800 MG: 800 TABLET, FILM COATED ORAL at 09:33

## 2023-05-14 RX ADMIN — ENOXAPARIN SODIUM 40 MG: 100 INJECTION SUBCUTANEOUS at 21:29

## 2023-05-14 RX ADMIN — IBUPROFEN 800 MG: 800 TABLET, FILM COATED ORAL at 19:59

## 2023-05-14 RX ADMIN — ACETAMINOPHEN 1000 MG: 500 TABLET ORAL at 14:05

## 2023-05-14 RX ADMIN — OXYCODONE 10 MG: 5 TABLET ORAL at 22:32

## 2023-05-14 RX ADMIN — OXYCODONE 5 MG: 5 TABLET ORAL at 00:03

## 2023-05-14 RX ADMIN — OXYCODONE 10 MG: 5 TABLET ORAL at 17:11

## 2023-05-14 RX ADMIN — ESCITALOPRAM 10 MG: 10 TABLET, FILM COATED ORAL at 09:28

## 2023-05-14 RX ADMIN — OXYCODONE 10 MG: 5 TABLET ORAL at 09:33

## 2023-05-14 RX ADMIN — IBUPROFEN 800 MG: 800 TABLET, FILM COATED ORAL at 01:25

## 2023-05-14 RX ADMIN — METFORMIN HYDROCHLORIDE 1 TABLET: 500 TABLET, EXTENDED RELEASE ORAL at 09:27

## 2023-05-14 RX ADMIN — ENOXAPARIN SODIUM 40 MG: 100 INJECTION SUBCUTANEOUS at 09:28

## 2023-05-14 ASSESSMENT — PAIN SCALES - GENERAL
PAINLEVEL_OUTOF10: 7
PAINLEVEL_OUTOF10: 7
PAINLEVEL_OUTOF10: 3
PAINLEVEL_OUTOF10: 5
PAINLEVEL_OUTOF10: 6
PAINLEVEL_OUTOF10: 6
PAINLEVEL_OUTOF10: 8
PAINLEVEL_OUTOF10: 7
PAINLEVEL_OUTOF10: 7

## 2023-05-14 ASSESSMENT — PAIN DESCRIPTION - LOCATION
LOCATION: ABDOMEN;INCISION

## 2023-05-14 ASSESSMENT — PAIN DESCRIPTION - DESCRIPTORS
DESCRIPTORS: ACHING;CRAMPING
DESCRIPTORS: ACHING;CRAMPING
DESCRIPTORS: DISCOMFORT
DESCRIPTORS: ACHING;BURNING;CRAMPING
DESCRIPTORS: DISCOMFORT

## 2023-05-14 ASSESSMENT — PAIN - FUNCTIONAL ASSESSMENT
PAIN_FUNCTIONAL_ASSESSMENT: ACTIVITIES ARE NOT PREVENTED

## 2023-05-14 ASSESSMENT — PAIN DESCRIPTION - ORIENTATION
ORIENTATION: LOWER

## 2023-05-15 VITALS
TEMPERATURE: 97.9 F | DIASTOLIC BLOOD PRESSURE: 75 MMHG | WEIGHT: 293 LBS | HEART RATE: 79 BPM | HEIGHT: 67 IN | RESPIRATION RATE: 16 BRPM | BODY MASS INDEX: 45.99 KG/M2 | SYSTOLIC BLOOD PRESSURE: 129 MMHG | OXYGEN SATURATION: 98 %

## 2023-05-15 PROCEDURE — 99024 POSTOP FOLLOW-UP VISIT: CPT | Performed by: OBSTETRICS & GYNECOLOGY

## 2023-05-15 PROCEDURE — 6360000002 HC RX W HCPCS: Performed by: OBSTETRICS & GYNECOLOGY

## 2023-05-15 PROCEDURE — 6370000000 HC RX 637 (ALT 250 FOR IP): Performed by: OBSTETRICS & GYNECOLOGY

## 2023-05-15 RX ORDER — IBUPROFEN 800 MG/1
800 TABLET ORAL EVERY 8 HOURS PRN
Qty: 40 TABLET | Refills: 1 | Status: SHIPPED | OUTPATIENT
Start: 2023-05-15

## 2023-05-15 RX ORDER — PSEUDOEPHEDRINE HCL 30 MG
100 TABLET ORAL 2 TIMES DAILY PRN
Qty: 30 CAPSULE | Refills: 1 | Status: SHIPPED | OUTPATIENT
Start: 2023-05-15

## 2023-05-15 RX ORDER — BUTALBITAL, ACETAMINOPHEN, CAFFEINE AND CODEINE PHOSPHATE 300; 50; 40; 30 MG/1; MG/1; MG/1; MG/1
1 CAPSULE ORAL EVERY 4 HOURS PRN
Qty: 20 CAPSULE | Refills: 1 | Status: SHIPPED | OUTPATIENT
Start: 2023-05-15 | End: 2023-05-25

## 2023-05-15 RX ORDER — OXYCODONE HYDROCHLORIDE 5 MG/1
5 TABLET ORAL EVERY 6 HOURS PRN
Qty: 28 TABLET | Refills: 0 | Status: SHIPPED | OUTPATIENT
Start: 2023-05-15 | End: 2023-05-22

## 2023-05-15 RX ORDER — FERROUS SULFATE 325(65) MG
325 TABLET ORAL
Qty: 30 TABLET | Refills: 3 | Status: SHIPPED | OUTPATIENT
Start: 2023-05-15

## 2023-05-15 RX ADMIN — IBUPROFEN 800 MG: 800 TABLET, FILM COATED ORAL at 05:40

## 2023-05-15 RX ADMIN — ENOXAPARIN SODIUM 40 MG: 100 INJECTION SUBCUTANEOUS at 09:14

## 2023-05-15 RX ADMIN — OXYCODONE 10 MG: 5 TABLET ORAL at 09:12

## 2023-05-15 RX ADMIN — OXYCODONE 10 MG: 5 TABLET ORAL at 12:57

## 2023-05-15 RX ADMIN — METFORMIN HYDROCHLORIDE 1 TABLET: 500 TABLET, EXTENDED RELEASE ORAL at 09:13

## 2023-05-15 RX ADMIN — OXYCODONE 10 MG: 5 TABLET ORAL at 03:02

## 2023-05-15 RX ADMIN — ESCITALOPRAM 10 MG: 10 TABLET, FILM COATED ORAL at 09:13

## 2023-05-15 ASSESSMENT — PAIN SCALES - GENERAL
PAINLEVEL_OUTOF10: 7
PAINLEVEL_OUTOF10: 6

## 2023-05-15 ASSESSMENT — PAIN DESCRIPTION - ORIENTATION: ORIENTATION: LOWER

## 2023-05-15 ASSESSMENT — PAIN DESCRIPTION - DESCRIPTORS
DESCRIPTORS: ACHING;CRAMPING;SORE
DESCRIPTORS: ACHING;SORE

## 2023-05-15 ASSESSMENT — PAIN DESCRIPTION - LOCATION
LOCATION: ABDOMEN;INCISION
LOCATION: ABDOMEN;INCISION

## 2023-05-15 NOTE — PROGRESS NOTES
Postpartum and infant care teaching completed and forms signed by patient. Copy witnessed by RN and given to patient. Patient verbalized understanding of all teaching points. Prescriptions given. Patient plans to follow-up with [facility] Provider as instructed. Patient verbalizes understanding of discharge instructions and denies further questions. ID bands checked and securtiy band removed.

## 2023-05-15 NOTE — PLAN OF CARE
Problem: Vaginal Birth or  Section  Goal: Fetal and maternal status remain reassuring during the birth process  Description:  Birth OB-Pregnancy care plan goal which identifies if the fetal and maternal status remain reassuring during the birth process  Outcome: Completed     Problem: Postpartum  Goal: Experiences normal postpartum course  Description:  Postpartum OB-Pregnancy care plan goal which identifies if the mother is experiencing a normal postpartum course  Outcome: Completed  Goal: Appropriate maternal -  bonding  Description:  Postpartum OB-Pregnancy care plan goal which identifies if the mother and  are bonding appropriately  Outcome: Completed  Goal: Establishment of infant feeding pattern  Description:  Postpartum OB-Pregnancy care plan goal which identifies if the mother is establishing a feeding pattern with their   Outcome: Completed  Goal: Incisions, wounds, or drain sites healing without S/S of infection  Outcome: Completed  Flowsheets (Taken 5/15/2023 0843)  Incisions, Wounds, or Drain Sites Healing Without Sign and Symptoms of Infection: ADMISSION and DAILY: Assess and document risk factors for pressure ulcer development     Problem: Pain  Goal: Verbalizes/displays adequate comfort level or baseline comfort level  Outcome: Completed  Flowsheets (Taken 5/15/2023 3585)  Verbalizes/displays adequate comfort level or baseline comfort level:   Encourage patient to monitor pain and request assistance   Assess pain using appropriate pain scale     Problem: Infection - Adult  Goal: Absence of infection at discharge  Outcome: Completed  Goal: Absence of infection during hospitalization  Outcome: Completed  Goal: Absence of fever/infection during anticipated neutropenic period  Outcome: Completed     Problem: Safety - Adult  Goal: Free from fall injury  Outcome: Completed     Problem: Discharge Planning  Goal: Discharge to home or other facility with appropriate

## 2023-05-15 NOTE — DISCHARGE SUMMARY
Department of Obstetrics and Gynecology  Postpartum Discharge Summary      Admit Date: 2023    Admit Diagnosis: Previous  section [Z98.891]  Full-term premature rupture of membranes (PROM) with unknown onset of labor [O42.90]    Discharge Date: 05/15/23    Condition at Discharge: good    Discharge Diagnoses: repeat C section    Discharge Disposition:  Home    Service: Obstetrics    Postpartum complications: none     Hospital Course: uncomplicated     Data:  Information for the patient's : Joyce Bullock, Baby Madhu Weinberg [facility] [[id number]]      Weight   Information for the patient's : Morgan Weinberg [facility] [[id number]]      Apgars   Information for the patient's : Morgan Weinberg [facility] [[id number]]       Disposition of Baby:  Home with mother      Current Discharge Medication List        START taking these medications    Details   oxyCODONE (ROXICODONE) 5 MG immediate release tablet Take 1 tablet by mouth every 6 hours as needed for Pain for up to 7 days. Max Daily Amount: 20 mg  Qty: 28 tablet, Refills: 0    Comments: Reduce doses taken as pain becomes manageable  Associated Diagnoses: S/P repeat low transverse       ibuprofen (ADVIL;MOTRIN) 800 MG tablet Take 1 tablet by mouth every 8 hours as needed for Pain  Qty: 40 tablet, Refills: 1      ferrous sulfate (IRON 325) 325 (65 Fe) MG tablet Take 1 tablet by mouth daily (with breakfast)  Qty: 30 tablet, Refills: 3      docusate sodium (COLACE, DULCOLAX) 100 MG CAPS Take 100 mg by mouth 2 times daily as needed for Constipation  Qty: 30 capsule, Refills: 1           CONTINUE these medications which have CHANGED    Details   butalbital-acetaminophen-caffeine-codeine (FIORICET WITH CODEINE) -71-30 MG per capsule Take 1 capsule by mouth every 4 hours as needed (headache) for up to 10 days.  Max Daily Amount: 6 capsules  Qty: 20 capsule, Refills: 1    Comments: Reduce doses taken as pain becomes

## 2023-05-15 NOTE — PROGRESS NOTES
Discharge Phone Call    Patient Name: Karina Julio     [facility] Care Provider: Gabe Ayala MD Discharge Date: 5/15/2023    Disposition of baby:    Phone Number: [phone] (home)     Attempts to Contact:  Date:    Caller  Date:    Caller  Date:    Caller    Information for the patient's : Rosita, Baby Boy Joseph Castleman [[id number]]   Delivery Method: , Low Transverse     1. Now that you are at home is your pain being well controlled? Y/N   If no, instruct to call       provider. 2. Are you breastfeeding? Y/N    Do you need any extra support from our lactation staff? Y/N    If yes, provide number for lactation. 3. Have you made or already had your first appointment with the baby's doctor? Y/N   If no, do      you know when to schedule it? Y/N    4. Have you scheduled your follow-up appointment? Y/N  If no, do you know when to schedule       it? Y/N   If no, they can find it on printed discharge instructions. 5. Did staff discuss safe sleep during your stay? Y/N   6. Did we explain things in a way you could understand? Y/N  7. Were we respectful of your preferences for labor and birth and include you in the plan of       care? Y/N  If no, please explain _______________________________________________  8. Is there anyone in particular you would like to mention who provided care for you? _______      _________________________________________________________________________     9. Were you given a Post-Birth Warning Signs handout? Y/N  Do you have it somewhere      easily accessible? Y/N  If no, please send them a copy and ask them to put it somewhere      easily found. 10. Have you been crying excessively, having anger or mood swings that feel out of control, or       feel like you can't cope with caring for yourself or baby? Y/N   If yes, they may be showing       signs of postpartum depression and should call provider.  There is also a        depression test on

## 2023-05-15 NOTE — PROGRESS NOTES
Post Partum Progress Note    Subjective:  Bryan Schuster is a 30 y.o. L6O5010 status-post  uncomplicated . The pregnancy was complicated by  gestational diabetes insulin controlled, morbid obesity, polyhydramnios in 3rd trimester, AMA, Crohns disease, nausea/vomiting, anxiety (prescribed Xanax, Lexapro, and Pristiq) shortness of breath and history of presumptive pulmonary embolus (occurred during previous pregnancy while on bedrest--declined CT PE study) . Patient is doing well with no concerns. Pain is well controlled with current regimen. Lochia moderate. Tolerating regular diet without difficulty. Ambulating without difficulty, denies dizziness on standing. Voiding without difficulty. She is breast feeding. Denies chest pain, SOB, or leg pain. Objective:  /75   Pulse 79   Temp 97.9 °F (36.6 °C) (Oral)   Resp 16   Ht 5' 7\" (1.702 m)   Wt (!) 344 lb (156 kg)   LMP 2022 (Exact Date)   SpO2 98%   Breastfeeding Unknown   BMI 53.88 kg/m²     GENERAL APPEARANCE: alert, well appearing, in no apparent distress  LUNGS: clear to auscultation, no wheezes, rales or rhonchi, symmetric air entry  HEART: regular rate and rhythm  ABDOMEN POSTPARTUM: soft, nontender, incision with JON in place, no shadowing, dressing c/d/i  EXTREMITIES: no redness or tenderness in the calves or thighs, no edema    No intake/output data recorded. Pertinent Labs:   CBC:   Recent Labs     23  1615 23  0607   WBC 10.4 12.5*   HGB 10.8* 10.0*   HCT 32.2* 30.9*    171     BMP:  No results for input(s): NA, K, CL, CO2, BUN, CREATININE, GLUCOSE in the last 72 hours. Hepatic: No results for input(s): AST, ALT, ALB, BILITOT, ALKPHOS in the last 72 hours. Mag:    No results for input(s): MG in the last 72 hours. Phos:   No results for input(s): PHOS in the last 72 hours. INR: No results for input(s): INR in the last 72 hours. Assessment/Plan:  Bryan Schuster is a 30 y.o.

## 2023-05-15 NOTE — DISCHARGE INSTRUCTIONS
Thank you for the opportunity to care for you and your family. We hope that you are happy with the care we provided during your stay in the [facility]. We want to ensure that you have the help that you need when you leave the hospital. If there is anything that we can assist you with please let us know. Breastfeeding mothers may contact our lactation specialists with any problems or questions. [facility] lactation services phone number is [phone]. Leave a message and your call will be returned. Please refer to the information provided in the postpartum care booklet. The following are warning signs to remember. Call 911 if you have:    Chest pain or pressure  Shortness of breath, even at rest  Thoughts of hurting yourself or others  Seizures    Call your healthcare provider if you have:    Temperature of 100.4 degrees or higher  Stitches that are not healing             --Swelling, bleeding, drainage, foul odor, redness or warmth in/around your                 stitches, staples, or incision (scar)               -- Bad smelling blood or discharge from the vagina  Vaginal bleeding that has increased             --Soaking through one pad in an hour             --You are passing clots larger than the size of a lemon. Red, warm tender area(s) in your breast or calf. Headache that does not get better, even after taking medicine; or headache with vision changes    Remember to notify all healthcare providers from your date of delivery up to one year after birth! CARING FOR YOURSELF      DIET/ACTIVITY    Eat a well balanced diet focusing on food high in fiber and protein. Drink plenty of fluids, especially water. To avoid constipation you may take a mild stool softener as recommended by your doctor or midwife. Gradually increase your activity. Resume an exercise regime only after being advised by your doctor or midwife.   When sitting or lying down, keep your legs elevated to

## 2023-05-16 ENCOUNTER — CARE COORDINATION (OUTPATIENT)
Dept: OTHER | Facility: CLINIC | Age: 38
End: 2023-05-16

## 2023-05-17 ENCOUNTER — CARE COORDINATION (OUTPATIENT)
Dept: OTHER | Facility: CLINIC | Age: 38
End: 2023-05-17

## 2023-05-18 NOTE — CARE COORDINATION
SW received call from NP re + cord results, unsure why SW was not consulted prior. Cord + Codeine and Morphine and Barbiturate ( has Rx for this ). Writer placed call to [address] spoke with [employer]. NITESH Suarez     Patient:  Sunday Oneal PCP:   [facility]   MRN:  [id number] Hospital Provider:  Monique Hernandez Physician   Infant Name after D/C:  Eric Ward Date of Note:  5/15/2023      YOB: 2021  8:07 PM  Birth Wt:   Birth Weight: 7 lb 4.8 oz (3.31 kg) Most Recent Wt:  Weight: 6 lb 10.3 oz (3.013 kg) Percent loss since birth weight:  -9%    Gestational Age: 41w0d Birth Length:  Height: 19.75\" (50.2 cm) (Filed from Delivery Summary)  Birth Head Circumference:  Birth Head Circumference: 36 cm (14.17\")           Apgar 9,9

## 2023-05-22 ENCOUNTER — POSTPARTUM VISIT (OUTPATIENT)
Dept: OBGYN CLINIC | Age: 38
End: 2023-05-22

## 2023-05-22 VITALS
BODY MASS INDEX: 49.9 KG/M2 | HEART RATE: 98 BPM | WEIGHT: 293 LBS | TEMPERATURE: 98 F | SYSTOLIC BLOOD PRESSURE: 128 MMHG | DIASTOLIC BLOOD PRESSURE: 86 MMHG

## 2023-05-22 DIAGNOSIS — O24.414 INSULIN CONTROLLED GESTATIONAL DIABETES MELLITUS (GDM) IN THIRD TRIMESTER: ICD-10-CM

## 2023-05-22 DIAGNOSIS — Z98.891 S/P REPEAT LOW TRANSVERSE C-SECTION: Primary | ICD-10-CM

## 2023-05-22 PROBLEM — Z34.83 PRENATAL CARE, SUBSEQUENT PREGNANCY IN THIRD TRIMESTER: Status: RESOLVED | Noted: 2022-12-08 | Resolved: 2023-05-22

## 2023-05-22 PROBLEM — O09.523 MULTIGRAVIDA OF ADVANCED MATERNAL AGE IN THIRD TRIMESTER: Status: RESOLVED | Noted: 2022-12-20 | Resolved: 2023-05-22

## 2023-05-22 PROBLEM — O99.210 MATERNAL OBESITY AFFECTING PREGNANCY, ANTEPARTUM: Status: RESOLVED | Noted: 2022-12-08 | Resolved: 2023-05-22

## 2023-05-22 PROBLEM — O42.90 FULL-TERM PREMATURE RUPTURE OF MEMBRANES (PROM) WITH UNKNOWN ONSET OF LABOR: Status: RESOLVED | Noted: 2023-05-12 | Resolved: 2023-05-22

## 2023-05-22 PROBLEM — O26.893 PREGNANCY HEADACHE IN THIRD TRIMESTER: Status: RESOLVED | Noted: 2023-05-05 | Resolved: 2023-05-22

## 2023-05-22 PROBLEM — O40.3XX0 POLYHYDRAMNIOS IN THIRD TRIMESTER: Status: RESOLVED | Noted: 2023-03-25 | Resolved: 2023-05-22

## 2023-05-22 PROBLEM — R51.9 PREGNANCY HEADACHE IN THIRD TRIMESTER: Status: RESOLVED | Noted: 2023-05-05 | Resolved: 2023-05-22

## 2023-05-22 PROBLEM — O23.41: Status: RESOLVED | Noted: 2022-12-08 | Resolved: 2023-05-22

## 2023-05-22 PROCEDURE — 0503F POSTPARTUM CARE VISIT: CPT | Performed by: OBSTETRICS & GYNECOLOGY

## 2023-05-22 NOTE — PROGRESS NOTES
Postpartum Visit    Subjective:  30 y.o. [id number] female S/P uncomplicated  on 23 here for postpartum visit. The pregnancy was complicated by  maternal obesity, GDM-insulin, prior Cdx1, anxiety on lexapro, Crohn's disease, AMA, ?able history of PE, history of ectopic pregnancy . Postpartum course has been uncomplicated. Pain controlled, mostly using ibuprofen and tylenol. Bleeding is very light. Bowel function is normal. Bladder function is normal. Patient is not sexually active. Contraception method is abstinence. Baby has been doing well without problems. Baby is feeding by bottle. No other complaints are noted including headache, change in vision, fever, chills, chest pain, shortness of breath, nausea, vomiting, diarrhea or constipation. The patient denies any urinary complaints or calf tenderness. Review of Systems - The following ROS was otherwise negative, except as noted in the HPI: constitutional, respiratory, cardiovascular, gastrointestinal, genitourinary    Objective:  GENERAL APPEARANCE: alert, well appearing, in no apparent distress  LUNGS: clear to auscultation, no wheezes, rales or rhonchi, symmetric air entry  HEART: regular rate and rhythm  ABDOMEN POSTPARTUM: incision well-healed, without erythema, without hematoma, and without evidence of infection  EXTREMITIES: no redness or tenderness in the calves or thighs, no edema    MWQ: 4, no SI/HI    Impression:  30 y.o. [id number] s/p  Section on 23 here for her postpartum visit:    Plan:  1. S/P repeat low transverse   Doing well, routine care  - Feeding: bottle  - BCM: plan for vasectomy, does not want anything hormonal  - Moods: no signs/sx PPD, denies SI/HI  - Bleeding: improving    2.  Insulin controlled gestational diabetes mellitus (GDM) in third trimester  - Will need 2hr GTT postpartum 6-12 weeks    Dispo: PRN or 4 weeks for PPV  Baldev Rodriguez MD

## 2023-05-26 ENCOUNTER — CARE COORDINATION (OUTPATIENT)
Dept: OTHER | Facility: CLINIC | Age: 38
End: 2023-05-26

## 2023-06-05 ENCOUNTER — TELEPHONE (OUTPATIENT)
Dept: OBGYN CLINIC | Age: 38
End: 2023-06-05

## 2023-06-05 NOTE — TELEPHONE ENCOUNTER
Pt called reporting her incision looks like it is  on one side and she can see the internal stiches, pt also reports pain and redness, no drainage, no fever/chills/body aches. Pt nest appt is 6/19/23.   Please Advise

## 2023-06-06 ENCOUNTER — OFFICE VISIT (OUTPATIENT)
Dept: OBGYN CLINIC | Age: 38
End: 2023-06-06

## 2023-06-06 VITALS
TEMPERATURE: 97.4 F | WEIGHT: 293 LBS | HEART RATE: 88 BPM | BODY MASS INDEX: 49.34 KG/M2 | DIASTOLIC BLOOD PRESSURE: 86 MMHG | SYSTOLIC BLOOD PRESSURE: 126 MMHG

## 2023-06-06 DIAGNOSIS — Z98.891 S/P REPEAT LOW TRANSVERSE C-SECTION: ICD-10-CM

## 2023-06-06 DIAGNOSIS — R23.9 ALTERATION IN SKIN INTEGRITY RELATED TO SURGICAL INCISION: Primary | ICD-10-CM

## 2023-06-06 PROBLEM — R06.02 SHORTNESS OF BREATH DURING PREGNANCY: Status: RESOLVED | Noted: 2023-03-27 | Resolved: 2023-06-06

## 2023-06-06 PROBLEM — O99.891 SHORTNESS OF BREATH DURING PREGNANCY: Status: RESOLVED | Noted: 2023-03-27 | Resolved: 2023-06-06

## 2023-06-06 PROCEDURE — 0503F POSTPARTUM CARE VISIT: CPT | Performed by: OBSTETRICS & GYNECOLOGY

## 2023-06-06 NOTE — PROGRESS NOTES
tablet Take 1 tablet by mouth daily (with breakfast) 5/15/23   Richi Page MD   docusate sodium (COLACE, DULCOLAX) 100 MG CAPS Take 100 mg by mouth 2 times daily as needed for Constipation  Patient not taking: Reported on 5/17/2023 5/15/23   Richi Page MD   Misc. Devices KIT Nature's Blend Prenatal Multivitamin with Minerals [facility] Baby [facility])  [address]: [id number]; Take 1 softgel by mouth daily or as instructed by provider;  #qs for 6 months 5/9/23   Jaylen Hicks MD   Insulin Pen Needle 32G X 5 MM MISC 1 each by Does not apply route daily  Patient not taking: Reported on 5/22/2023 4/13/23   Danae Gordon DO   oxymetazoline (AFRIN) 0.05 % nasal spray 2 sprays by Nasal route as needed  Patient not taking: Reported on 5/22/2023    Historical Provider, MD   DHEA 25 MG CAPS Take 25 mg by mouth daily  Patient not taking: Reported on 5/22/2023    Historical Provider, MD   escitalopram (LEXAPRO) 10 MG tablet Take 1 tablet by mouth daily 4/4/23   Rachel Ramsey MD   certolizumab pegol (CIMZIA) 2 X 200 MG KIT injection Inject 400 mg under the skin every 28 days  Patient not taking: Reported on 5/22/2023 1/4/23   Clarissa Oneil MD   Fluticasone Propionate (FLONASE NA) 1 spray by Nasal route daily as needed  Patient not taking: Reported on 5/22/2023    Historical Provider, MD   Prenatal Vit-Fe Fumarate-FA (PRENATAL FORMULA 3 PO) Take 1 tablet by mouth daily  Patient not taking: Reported on 5/22/2023    Historical Provider, MD   Cholecalciferol (VITAMIN D3) 50 MCG (2000 UT) CAPS Take 1 capsule by mouth daily  Patient not taking: Reported on 5/22/2023    Historical Provider, MD   loratadine (CLARITIN) 10 MG capsule Take 1 capsule by mouth daily 12/12/16   Historical Provider, MD       Objective:  /86 (Position: Sitting)   Pulse 88   Temp 97.4 °F (36.3 °C)   Wt (!) 315 lb (142.9 kg)   BMI 49.34 kg/m²     Physical Exam  Vitals reviewed.    Constitutional:       General: She is not in acute

## 2023-06-16 ENCOUNTER — CARE COORDINATION (OUTPATIENT)
Dept: OTHER | Facility: CLINIC | Age: 38
End: 2023-06-16

## 2023-06-19 ENCOUNTER — POSTPARTUM VISIT (OUTPATIENT)
Dept: OBGYN CLINIC | Age: 38
End: 2023-06-19

## 2023-06-19 VITALS
HEART RATE: 81 BPM | SYSTOLIC BLOOD PRESSURE: 106 MMHG | HEIGHT: 67 IN | TEMPERATURE: 97.3 F | WEIGHT: 293 LBS | BODY MASS INDEX: 45.99 KG/M2 | DIASTOLIC BLOOD PRESSURE: 74 MMHG

## 2023-06-19 DIAGNOSIS — Z30.09 ENCOUNTER FOR COUNSELING REGARDING CONTRACEPTION: ICD-10-CM

## 2023-06-19 DIAGNOSIS — F32.5 MAJOR DEPRESSIVE DISORDER WITH SINGLE EPISODE, IN FULL REMISSION (HCC): ICD-10-CM

## 2023-06-19 PROCEDURE — 0503F POSTPARTUM CARE VISIT: CPT | Performed by: OBSTETRICS & GYNECOLOGY

## 2023-06-19 NOTE — PROGRESS NOTES
Postpartum Visit    CC:   Chief Complaint   Patient presents with    Postpartum Care     6 week check         41 y.o. [id number] s/p  Section on 2023 here for evaluation of her incision. HPI:    Patient is doing well today. Postpartum course has been uncomplicated. Reports lochia has stopped. Pain is well controlled. Ambulating without difficulty, denies dizziness with standing. Denies concerns with bowel or bladder function. Denies headaches, vision changes, RUQ pain, increased LE edema. Denies chest pain, shortness of breath, fever, chills, nausea, vomiting. Reports is having some mild cramping and soreness at the site of her spinal.  Notices it worse with activity. Patient is bottle feeding, which is going well. Patient is not sexually active. Desires vasectomy for contraception. Review of Systems - The following ROS was otherwise negative, except as noted in the HPI: constitutional, respiratory, cardiovascular, gastrointestinal, genitourinary    OB History  OB History    Para Term  AB Living   6 2 2 0 4 2   SAB IAB Ectopic Molar Multiple Live Births   2 0 2   0 2      # Outcome Date GA Lbr Anil/2nd Weight Sex Delivery Anes PTL Lv   6 Term 23 37w0d  7 lb 4.8 oz (3.31 kg) M CS-LTranv Spinal N PARTHA   5 Term 17 39w5d   M CS-LTranv EPI N PARTHA      Complications: Failure to Progress in First Stage   4 Ectopic 14           3 SAB 14 8w0d          2 SAB 12 4w0d          1 Ectopic 08 7w0d              Medications:  Prior to Admission medications    Medication Sig Start Date End Date Taking? Authorizing Provider   ibuprofen (ADVIL;MOTRIN) 800 MG tablet Take 1 tablet by mouth every 8 hours as needed for Pain 5/15/23  Yes Alonzo Montoya MD   ferrous sulfate (IRON 325) 325 (65 Fe) MG tablet Take 1 tablet by mouth daily (with breakfast) 5/15/23  Yes Alonzo Montoya MD   [facility]  Devices KIT Nature's Blend Prenatal Multivitamin with Minerals [facility]

## 2023-06-19 NOTE — PROGRESS NOTES
Maternal emotional well being screening form completed and reviewed with patient. Current score is 3.

## 2023-06-23 RX ORDER — IBUPROFEN 800 MG/1
800 TABLET ORAL EVERY 8 HOURS PRN
Qty: 40 TABLET | Refills: 1 | Status: SHIPPED | OUTPATIENT
Start: 2023-06-23

## 2023-07-07 ENCOUNTER — CARE COORDINATION (OUTPATIENT)
Dept: OTHER | Facility: CLINIC | Age: 38
End: 2023-07-07

## 2023-07-07 NOTE — CARE COORDINATION
ACM attempted to reach patient for [facility] transitions call. HIPAA compliant message left requesting a return phone call at patients convenience. Will continue to follow.

## 2023-08-28 ENCOUNTER — TELEPHONE (OUTPATIENT)
Dept: PHARMACY | Age: 38
End: 2023-08-28

## 2023-08-28 DIAGNOSIS — K50.019 CROHN'S DISEASE OF SMALL INTESTINE WITH COMPLICATION (HCC): Primary | ICD-10-CM

## 2023-08-28 RX ORDER — CERTOLIZUMAB PEGOL 400 MG
KIT SUBCUTANEOUS
Qty: 1 EACH | Refills: 5 | Status: SHIPPED | OUTPATIENT
Start: 2023-08-28

## 2023-08-28 NOTE — TELEPHONE ENCOUNTER
Specialty Medication Service    Date: 8/28/2023  Patient's Name: Rosemarie Olivo YOB: 1985            _____________________________________________________________________________________________    Rosemarie Olivo is a 41 y.o. female enrolled in the Specialty Medication Service. We received a refill request for Cimzia on 08/28/2023. Original Rx was written for 7 fills on 07/26/2023.      Thank you,    HAMILTON NAZARIO      Requested Prescriptions     Pending Prescriptions Disp Refills    certolizumab pegol (CIMZIA) 2 X 200 MG KIT injection 1 each 5     Sig: Inject 400 mg under the skin every 28 days

## 2023-08-28 NOTE — TELEPHONE ENCOUNTER
Specialty Medication Service    Date: 8/28/2023  Patient's Name: Shahab Prakash YOB: 1985            _____________________________________________________________________________________________            Refill request received from [address] Patient last seen by [facility] 4/6/2023 (no follow up scheduled). Labs are up to date. CPA on file, will send new SMS prescription.      Autumn Guzmán, PharmD, [address] [facility] Specialty Medication Service  Phone: [phone] [address] [facility]  Phone: [phone] option 1    For Pharmacy Admin Tracking Only    Program: SMS  CPA in place:  Yes  Recommendation Provided To: Patient/Caregiver: 1 via Telephone  Intervention Detail: Refill(s) Provided  Intervention Accepted By: Patient/Caregiver: 1  Time Spent (min): 15

## 2023-09-27 ENCOUNTER — TELEPHONE (OUTPATIENT)
Dept: PHARMACY | Age: 38
End: 2023-09-27

## 2023-09-27 NOTE — TELEPHONE ENCOUNTER
Specialty Medication Service    Date: 9/27/2023  Patient's Name: Jonna Vivar YOB: 1985            _____________________________________________________________________________________________    Patient no longer has [facility]) benefits (termed 9/1/23). Patient is no longer enrolled in SMS program.  Sent MyChart Message  to explain they will no longer be eligible for SMS services and that we will be discharging them from the program.  Informed patient future SMS appointments will be canceled and no further outreach planned. Patient instructed to contact [address] [place] [address] ([phone]) to provide updated insurance information for continuity in care if possible. and Additionally, informed patient if they believe this is incorrect to contact  ([phone]) right away. In addition, mailed letter/sent MyChart message (if applicable), updated SMS spreadsheet, deactivated any current SMS prescriptions and updated Ean Celis as well as alerted the pharmacy. Thank you,  Artie Santana, [facility]  Clinical Pharmacist         For Pharmacy Admin Tracking Only    Program: SMS  Time Spent (min): 15

## 2023-10-05 ENCOUNTER — OFFICE VISIT (OUTPATIENT)
Dept: INTERNAL MEDICINE CLINIC | Age: 38
End: 2023-10-05
Payer: COMMERCIAL

## 2023-10-05 VITALS
DIASTOLIC BLOOD PRESSURE: 65 MMHG | WEIGHT: 293 LBS | TEMPERATURE: 97.3 F | HEART RATE: 74 BPM | BODY MASS INDEX: 49.96 KG/M2 | SYSTOLIC BLOOD PRESSURE: 110 MMHG | OXYGEN SATURATION: 98 %

## 2023-10-05 DIAGNOSIS — M06.09 RHEUMATOID ARTHRITIS OF MULTIPLE SITES WITH NEGATIVE RHEUMATOID FACTOR (HCC): ICD-10-CM

## 2023-10-05 DIAGNOSIS — F32.5 MAJOR DEPRESSIVE DISORDER WITH SINGLE EPISODE, IN FULL REMISSION (HCC): Primary | ICD-10-CM

## 2023-10-05 DIAGNOSIS — F41.1 GAD (GENERALIZED ANXIETY DISORDER): ICD-10-CM

## 2023-10-05 DIAGNOSIS — E66.01 SEVERE OBESITY (HCC): ICD-10-CM

## 2023-10-05 DIAGNOSIS — K50.00 CROHN'S DISEASE OF SMALL INTESTINE WITHOUT COMPLICATION (HCC): ICD-10-CM

## 2023-10-05 DIAGNOSIS — Z00.00 PREVENTATIVE HEALTH CARE: ICD-10-CM

## 2023-10-05 DIAGNOSIS — E55.9 VITAMIN D DEFICIENCY: ICD-10-CM

## 2023-10-05 DIAGNOSIS — D64.9 ANEMIA, UNSPECIFIED TYPE: ICD-10-CM

## 2023-10-05 PROCEDURE — 99214 OFFICE O/P EST MOD 30 MIN: CPT | Performed by: STUDENT IN AN ORGANIZED HEALTH CARE EDUCATION/TRAINING PROGRAM

## 2023-10-05 RX ORDER — LOPERAMIDE HYDROCHLORIDE 2 MG/1
2 CAPSULE ORAL 4 TIMES DAILY PRN
Qty: 60 CAPSULE | Refills: 0 | Status: SHIPPED | OUTPATIENT
Start: 2023-10-05 | End: 2023-11-04

## 2023-10-05 RX ORDER — ESCITALOPRAM OXALATE 10 MG/1
10 TABLET ORAL DAILY
Qty: 90 TABLET | Refills: 1 | Status: SHIPPED | OUTPATIENT
Start: 2023-10-05

## 2023-10-05 RX ORDER — FERROUS SULFATE 325(65) MG
325 TABLET ORAL
Qty: 30 TABLET | Refills: 3 | Status: SHIPPED | OUTPATIENT
Start: 2023-10-05

## 2023-10-05 NOTE — PROGRESS NOTES
[place]   10/5/2023    Odilia Becker (:  1985) is a 41 y.o. female, here for evaluation of the following medical concerns:    Chief Complaint   Patient presents with    Follow-up     pregancy        ASSESSMENT/ PLAN  1. Major depressive disorder with single episode, in full remission ([address]  2. ELLY (generalized anxiety disorder)  Continue Lexapro. Mood has been stable overall  - escitalopram (LEXAPRO) 10 MG tablet; Take 1 tablet by mouth daily  Dispense: 90 tablet; Refill: 1    3. Crohn's disease of small intestine without complication ([address])  -Currently on Cimzia. Following up with GI.    - loperamide (RA ANTI-DIARRHEAL) 2 MG capsule; Take 1 capsule by mouth 4 times daily as needed for Diarrhea  Dispense: 60 capsule; Refill: 0    4. Rheumatoid arthritis of multiple sites with negative rheumatoid factor ([address])  -Continue follow-up with rheumatology    5. Severe obesity (HCC)  -BMI of 49.96. Counseled on diet weight loss and exercise    6. Preventative health care  - Comprehensive Metabolic Panel; Future  - LIPID PANEL; Future  - Hemoglobin A1C; Future  - TSH with Reflex to FT4; Future    7. Anemia, unspecified type  -Noted anemia on previous labs. Patient denies any fatigue at this time. No clinical pallor noted. Continue iron supplements. Repeat labs next visit  - ferrous sulfate (IRON 325) 325 (65 Fe) MG tablet; Take 1 tablet by mouth daily (with breakfast)  Dispense: 30 tablet; Refill: 3  - CBC with Auto Differential; Future  - Iron and TIBC; Future  - Ferritin; Future    8. Vitamin D deficiency  - Vitamin D 25 Hydroxy; Future       Follow-up in 6 months with labs    HPI  -Patient is a 61-year-old female presenting for follow-up. She still delivered a healthy baby boy and is doing well overall. Her mood is stable. Planning to go back to see her GI and her rheumatology physician. Denies any chest pain, shortness of breath today. Medications reconciled. She is back on Cimzia.   Noted

## 2023-10-09 ENCOUNTER — OFFICE VISIT (OUTPATIENT)
Dept: OBGYN CLINIC | Age: 38
End: 2023-10-09
Payer: COMMERCIAL

## 2023-10-09 VITALS
TEMPERATURE: 97.9 F | HEIGHT: 67 IN | DIASTOLIC BLOOD PRESSURE: 70 MMHG | BODY MASS INDEX: 45.99 KG/M2 | HEART RATE: 75 BPM | WEIGHT: 293 LBS | SYSTOLIC BLOOD PRESSURE: 112 MMHG

## 2023-10-09 DIAGNOSIS — Z01.419 ENCNTR FOR GYN EXAM (GENERAL) (ROUTINE) W/O ABN FINDINGS: Primary | ICD-10-CM

## 2023-10-09 DIAGNOSIS — N93.9 ABNORMAL UTERINE BLEEDING (AUB): ICD-10-CM

## 2023-10-09 PROCEDURE — 99395 PREV VISIT EST AGE 18-39: CPT | Performed by: OBSTETRICS & GYNECOLOGY

## 2023-10-09 ASSESSMENT — ENCOUNTER SYMPTOMS
SORE THROAT: 0
VOMITING: 0
ABDOMINAL PAIN: 0
CONSTIPATION: 0
COUGH: 0
SHORTNESS OF BREATH: 0

## 2023-10-09 NOTE — PROGRESS NOTES
Annual Exam      CC:   Chief Complaint   Patient presents with    Annual Exam       HPI:  41 y.o. [id number] presents for her gynecologic annual exam.    Patient seen and examined. Patient is overall doing well today. Menses are regular, lasting 5 days. First 2 days changing pads every 2 hours. Has some spotting the week following with 2 of the last 3 cycles. Is taking Motrin for cramping. Denies changes to medical or surgical history. Health Maintenance:  Birth control: Condoms, wants  to have a vasectomy  Pregnancy plans: None  Safe relationship:  -  in   Diet: Limited, currently with Crohn's and RA flare, tries to modify with diet choices  Exercise: Walking    Screening:  Last pap smear: 10/2022 - NILM, neg HPV  History of abnormal pap smears: Yes     - 2021 - NILM, neg HPV     - 2018 - NILM, neg HPV     - 6/3/2017 - NILM, neg HPV     - 2016 - ASCUS, HPV neg     - 2015 - NILM     - 2014 - ASCUS, HPV neg     - 3/2013 - NILM    Vaccines:  Gardasil vaccine: Has had   Flu vaccine: Has not had   COVID-19: Has had series and booster    Review of Systems:   Review of Systems   Constitutional:  Negative for chills and fever. HENT:  Negative for congestion and sore throat. Respiratory:  Negative for cough and shortness of breath. Cardiovascular:  Negative for chest pain and palpitations. Gastrointestinal:  Positive for diarrhea. Negative for abdominal pain, constipation, nausea and vomiting. Genitourinary:  Negative for dysuria, frequency, menstrual problem, pelvic pain and vaginal discharge. Musculoskeletal: Negative. Neurological:  Negative for headaches. All other systems reviewed and are negative. Breast: Reports recent skin changes and treatment for cellulitis.   Denies nipple discharge, lesions, dimpling, tenderness or palpable masses     Primary Care Physician: Krzysztof Jaramillo MD    Obstetric History  OB History    Para Term  AB

## 2023-10-15 ASSESSMENT — ENCOUNTER SYMPTOMS
NAUSEA: 0
DIARRHEA: 1

## 2024-01-15 ASSESSMENT — PATIENT HEALTH QUESTIONNAIRE - PHQ9
4. FEELING TIRED OR HAVING LITTLE ENERGY: 0
SUM OF ALL RESPONSES TO PHQ QUESTIONS 1-9: 0
SUM OF ALL RESPONSES TO PHQ QUESTIONS 1-9: 0
3. TROUBLE FALLING OR STAYING ASLEEP: 0
SUM OF ALL RESPONSES TO PHQ QUESTIONS 1-9: 0
10. IF YOU CHECKED OFF ANY PROBLEMS, HOW DIFFICULT HAVE THESE PROBLEMS MADE IT FOR YOU TO DO YOUR WORK, TAKE CARE OF THINGS AT HOME, OR GET ALONG WITH OTHER PEOPLE: 0
1. LITTLE INTEREST OR PLEASURE IN DOING THINGS: 0
2. FEELING DOWN, DEPRESSED OR HOPELESS: 0
2. FEELING DOWN, DEPRESSED OR HOPELESS: NOT AT ALL
SUM OF ALL RESPONSES TO PHQ9 QUESTIONS 1 & 2: 0
5. POOR APPETITE OR OVEREATING: 0
6. FEELING BAD ABOUT YOURSELF - OR THAT YOU ARE A FAILURE OR HAVE LET YOURSELF OR YOUR FAMILY DOWN: NOT AT ALL
1. LITTLE INTEREST OR PLEASURE IN DOING THINGS: NOT AT ALL
3. TROUBLE FALLING OR STAYING ASLEEP: NOT AT ALL
7. TROUBLE CONCENTRATING ON THINGS, SUCH AS READING THE NEWSPAPER OR WATCHING TELEVISION: 0
SUM OF ALL RESPONSES TO PHQ QUESTIONS 1-9: 0
9. THOUGHTS THAT YOU WOULD BE BETTER OFF DEAD, OR OF HURTING YOURSELF: 0
9. THOUGHTS THAT YOU WOULD BE BETTER OFF DEAD, OR OF HURTING YOURSELF: NOT AT ALL
10. IF YOU CHECKED OFF ANY PROBLEMS, HOW DIFFICULT HAVE THESE PROBLEMS MADE IT FOR YOU TO DO YOUR WORK, TAKE CARE OF THINGS AT HOME, OR GET ALONG WITH OTHER PEOPLE: NOT DIFFICULT AT ALL
5. POOR APPETITE OR OVEREATING: NOT AT ALL
4. FEELING TIRED OR HAVING LITTLE ENERGY: NOT AT ALL
8. MOVING OR SPEAKING SO SLOWLY THAT OTHER PEOPLE COULD HAVE NOTICED. OR THE OPPOSITE, BEING SO FIGETY OR RESTLESS THAT YOU HAVE BEEN MOVING AROUND A LOT MORE THAN USUAL: 0
8. MOVING OR SPEAKING SO SLOWLY THAT OTHER PEOPLE COULD HAVE NOTICED. OR THE OPPOSITE - BEING SO FIDGETY OR RESTLESS THAT YOU HAVE BEEN MOVING AROUND A LOT MORE THAN USUAL: NOT AT ALL
6. FEELING BAD ABOUT YOURSELF - OR THAT YOU ARE A FAILURE OR HAVE LET YOURSELF OR YOUR FAMILY DOWN: 0
7. TROUBLE CONCENTRATING ON THINGS, SUCH AS READING THE NEWSPAPER OR WATCHING TELEVISION: NOT AT ALL
SUM OF ALL RESPONSES TO PHQ QUESTIONS 1-9: 0

## 2024-01-16 ENCOUNTER — OFFICE VISIT (OUTPATIENT)
Dept: OBGYN CLINIC | Age: 39
End: 2024-01-16
Payer: COMMERCIAL

## 2024-01-16 VITALS
BODY MASS INDEX: 45.99 KG/M2 | HEIGHT: 67 IN | SYSTOLIC BLOOD PRESSURE: 124 MMHG | WEIGHT: 293 LBS | DIASTOLIC BLOOD PRESSURE: 80 MMHG | HEART RATE: 89 BPM | TEMPERATURE: 98.2 F | OXYGEN SATURATION: 99 %

## 2024-01-16 DIAGNOSIS — N92.6 MISSED MENSES: Primary | ICD-10-CM

## 2024-01-16 DIAGNOSIS — Z11.3 ROUTINE SCREENING FOR STI (SEXUALLY TRANSMITTED INFECTION): ICD-10-CM

## 2024-01-16 DIAGNOSIS — Z32.01 POSITIVE PREGNANCY TEST: ICD-10-CM

## 2024-01-16 DIAGNOSIS — O36.80X0 PREGNANCY WITH UNCERTAIN FETAL VIABILITY, SINGLE OR UNSPECIFIED FETUS: ICD-10-CM

## 2024-01-16 PROCEDURE — 99213 OFFICE O/P EST LOW 20 MIN: CPT | Performed by: OBSTETRICS & GYNECOLOGY

## 2024-01-16 PROCEDURE — 81025 URINE PREGNANCY TEST: CPT | Performed by: OBSTETRICS & GYNECOLOGY

## 2024-01-17 LAB
B-HCG SERPL EIA 3RD IS-ACNC: 1617 MIU/ML
BACTERIA URNS QL MICRO: ABNORMAL /HPF
BILIRUB UR QL STRIP.AUTO: NEGATIVE
CLARITY UR: CLEAR
COLOR UR: YELLOW
EPI CELLS #/AREA URNS AUTO: 5 /HPF (ref 0–5)
GLUCOSE UR STRIP.AUTO-MCNC: NEGATIVE MG/DL
HGB UR QL STRIP.AUTO: NEGATIVE
HYALINE CASTS #/AREA URNS AUTO: 0 /LPF (ref 0–8)
KETONES UR STRIP.AUTO-MCNC: NEGATIVE MG/DL
LEUKOCYTE ESTERASE UR QL STRIP.AUTO: ABNORMAL
NITRITE UR QL STRIP.AUTO: NEGATIVE
PH UR STRIP.AUTO: 6.5 [PH] (ref 5–8)
PROGEST SERPL-MCNC: 11.07 NG/ML
PROT UR STRIP.AUTO-MCNC: NEGATIVE MG/DL
RBC CLUMPS #/AREA URNS AUTO: 1 /HPF (ref 0–4)
SP GR UR STRIP.AUTO: 1.01 (ref 1–1.03)
UA DIPSTICK W REFLEX MICRO PNL UR: YES
URN SPEC COLLECT METH UR: ABNORMAL
UROBILINOGEN UR STRIP-ACNC: 0.2 E.U./DL
WBC #/AREA URNS AUTO: 5 /HPF (ref 0–5)

## 2024-01-17 NOTE — PROGRESS NOTES
Return Gyn Office Visit    CC:   Chief Complaint   Patient presents with    Amenorrhea       HPI:  Vanesa Becker is a 38 y.o. female who presents for evaluation of missed menses and positive home pregnancy test.     Patient is seen and examined today. Patient is overall doing well.     Reports menses are regular, lasting 5 days.  First 2 days are changing pads every 2 hours.  Reports  has a vasectomy scheduled 2/23/24.  LMP was 12/9/2023 and had a positive pregnancy test after.  Denies bleeding or pain following.     Patient has concerns due to Chron's, a severe RA flare following delivery, shortness of breath and pain symptoms she experienced in her last pregnancy as well as questionable abnormal echocardiogram if it is safe for her to be pregnant.      Denies chest pain, shortness of breath, fever, chills, nausea, vomiting.     Review of Systems - The following ROS was otherwise negative, except as noted in the HPI: constitutional, respiratory, cardiovascular, gastrointestinal, genitourinary    Objective:  /80 (Site: Right Upper Arm, Position: Sitting, Cuff Size: Large Adult)   Pulse 89   Temp 98.2 °F (36.8 °C) (Temporal)   Ht 1.702 m (5' 7\")   Wt (!) 146.5 kg (323 lb)   LMP 12/09/2023   SpO2 99%   BMI 50.59 kg/m²     Physical Exam  Vitals reviewed. Exam conducted with a chaperone present.   Constitutional:       General: She is not in acute distress.     Appearance: She is well-developed.   HENT:      Head: Normocephalic and atraumatic.   Eyes:      Conjunctiva/sclera: Conjunctivae normal.   Cardiovascular:      Rate and Rhythm: Normal rate.   Pulmonary:      Effort: Pulmonary effort is normal. No respiratory distress.   Abdominal:      General: There is no distension.      Palpations: Abdomen is soft.      Tenderness: There is no abdominal tenderness. There is no guarding or rebound.   Genitourinary:     General: Normal vulva.      Exam position: Lithotomy position.      Labia:

## 2024-01-18 LAB
BACTERIA UR CULT: ABNORMAL
BACTERIA UR CULT: ABNORMAL
C TRACH DNA CVX QL NAA+PROBE: NEGATIVE
N GONORRHOEA DNA CERV MUCUS QL NAA+PROBE: NEGATIVE
ORGANISM: ABNORMAL

## 2024-01-19 RX ORDER — AMOXICILLIN 500 MG/1
500 CAPSULE ORAL 2 TIMES DAILY
Qty: 14 CAPSULE | Refills: 0 | Status: SHIPPED | OUTPATIENT
Start: 2024-01-19 | End: 2024-01-26

## 2024-03-21 ENCOUNTER — HOSPITAL ENCOUNTER (OUTPATIENT)
Age: 39
Discharge: HOME | End: 2024-03-21
Payer: COMMERCIAL

## 2024-03-21 DIAGNOSIS — E55.9 VITAMIN D DEFICIENCY: ICD-10-CM

## 2024-03-21 DIAGNOSIS — Z00.00 PREVENTATIVE HEALTH CARE: ICD-10-CM

## 2024-03-21 DIAGNOSIS — D64.9 ANEMIA, UNSPECIFIED TYPE: ICD-10-CM

## 2024-03-21 LAB
25(OH)D3 SERPL-MCNC: 22.8 NG/ML
BASOPHILS # BLD: 0.1 K/UL (ref 0–0.2)
BASOPHILS NFR BLD: 0.6 %
DEPRECATED RDW RBC AUTO: 13.9 % (ref 12.4–15.4)
EOSINOPHIL # BLD: 0.7 K/UL (ref 0–0.6)
EOSINOPHIL NFR BLD: 6.7 %
FERRITIN SERPL IA-MCNC: 132.3 NG/ML (ref 15–150)
HCT VFR BLD AUTO: 39.2 % (ref 36–48)
HGB BLD-MCNC: 13.5 G/DL (ref 12–16)
LYMPHOCYTES # BLD: 2.6 K/UL (ref 1–5.1)
LYMPHOCYTES NFR BLD: 27.2 %
MCH RBC QN AUTO: 28.4 PG (ref 26–34)
MCHC RBC AUTO-ENTMCNC: 34.5 G/DL (ref 31–36)
MCV RBC AUTO: 82.4 FL (ref 80–100)
MONOCYTES # BLD: 0.5 K/UL (ref 0–1.3)
MONOCYTES NFR BLD: 4.7 %
NEUTROPHILS # BLD: 5.9 K/UL (ref 1.7–7.7)
NEUTROPHILS NFR BLD: 60.8 %
PLATELET # BLD AUTO: 218 K/UL (ref 135–450)
PMV BLD AUTO: 8 FL (ref 5–10.5)
RBC # BLD AUTO: 4.76 M/UL (ref 4–5.2)
TSH SERPL DL<=0.005 MIU/L-ACNC: 1.58 UIU/ML (ref 0.27–4.2)
WBC # BLD AUTO: 9.8 K/UL (ref 4–11)

## 2024-03-21 PROCEDURE — 83550 IRON BINDING TEST: CPT

## 2024-03-21 PROCEDURE — 82728 ASSAY OF FERRITIN: CPT

## 2024-03-21 PROCEDURE — 80053 COMPREHEN METABOLIC PANEL: CPT

## 2024-03-21 PROCEDURE — 83036 HEMOGLOBIN GLYCOSYLATED A1C: CPT

## 2024-03-21 PROCEDURE — 85025 COMPLETE CBC W/AUTO DIFF WBC: CPT

## 2024-03-21 PROCEDURE — 82306 VITAMIN D 25 HYDROXY: CPT

## 2024-03-21 PROCEDURE — 84443 ASSAY THYROID STIM HORMONE: CPT

## 2024-03-21 PROCEDURE — 83540 ASSAY OF IRON: CPT

## 2024-03-21 PROCEDURE — 80061 LIPID PANEL: CPT

## 2024-03-21 PROCEDURE — 36415 COLL VENOUS BLD VENIPUNCTURE: CPT

## 2024-03-22 ENCOUNTER — OFFICE VISIT (OUTPATIENT)
Dept: INTERNAL MEDICINE CLINIC | Age: 39
End: 2024-03-22
Payer: COMMERCIAL

## 2024-03-22 VITALS
OXYGEN SATURATION: 97 % | BODY MASS INDEX: 49.34 KG/M2 | SYSTOLIC BLOOD PRESSURE: 126 MMHG | TEMPERATURE: 97.4 F | WEIGHT: 293 LBS | HEART RATE: 90 BPM | DIASTOLIC BLOOD PRESSURE: 68 MMHG

## 2024-03-22 DIAGNOSIS — K50.00 CROHN'S DISEASE OF SMALL INTESTINE WITHOUT COMPLICATION (HCC): ICD-10-CM

## 2024-03-22 DIAGNOSIS — E55.9 VITAMIN D DEFICIENCY: ICD-10-CM

## 2024-03-22 DIAGNOSIS — M06.09 RHEUMATOID ARTHRITIS OF MULTIPLE SITES WITH NEGATIVE RHEUMATOID FACTOR (HCC): ICD-10-CM

## 2024-03-22 DIAGNOSIS — F32.5 MAJOR DEPRESSIVE DISORDER WITH SINGLE EPISODE, IN FULL REMISSION (HCC): Primary | ICD-10-CM

## 2024-03-22 DIAGNOSIS — E66.01 SEVERE OBESITY (HCC): ICD-10-CM

## 2024-03-22 DIAGNOSIS — F41.1 GAD (GENERALIZED ANXIETY DISORDER): ICD-10-CM

## 2024-03-22 PROBLEM — R79.82 CRP ELEVATED: Status: ACTIVE | Noted: 2024-03-22

## 2024-03-22 PROBLEM — M07.69: Status: ACTIVE | Noted: 2024-03-22

## 2024-03-22 PROBLEM — Z79.899 ENCOUNTER FOR LONG-TERM (CURRENT) USE OF HIGH-RISK MEDICATION: Status: ACTIVE | Noted: 2024-03-22

## 2024-03-22 LAB
ALBUMIN SERPL-MCNC: 4.4 G/DL (ref 3.4–5)
ALBUMIN/GLOB SERPL: 1.5 {RATIO} (ref 1.1–2.2)
ALP SERPL-CCNC: 79 U/L (ref 40–129)
ALT SERPL-CCNC: 19 U/L (ref 10–40)
ANION GAP SERPL CALCULATED.3IONS-SCNC: 10 MMOL/L (ref 3–16)
AST SERPL-CCNC: 18 U/L (ref 15–37)
BILIRUB SERPL-MCNC: 0.6 MG/DL (ref 0–1)
BUN SERPL-MCNC: 9 MG/DL (ref 7–20)
CALCIUM SERPL-MCNC: 9.4 MG/DL (ref 8.3–10.6)
CHLORIDE SERPL-SCNC: 103 MMOL/L (ref 99–110)
CHOLEST SERPL-MCNC: 161 MG/DL (ref 0–199)
CO2 SERPL-SCNC: 26 MMOL/L (ref 21–32)
CREAT SERPL-MCNC: 0.6 MG/DL (ref 0.6–1.1)
EST. AVERAGE GLUCOSE BLD GHB EST-MCNC: 102.5 MG/DL
GFR SERPLBLD CREATININE-BSD FMLA CKD-EPI: >60 ML/MIN/{1.73_M2}
GLUCOSE SERPL-MCNC: 94 MG/DL (ref 70–99)
HBA1C MFR BLD: 5.2 %
HDLC SERPL-MCNC: 43 MG/DL (ref 40–60)
IRON SATN MFR SERPL: 28 % (ref 15–50)
IRON SERPL-MCNC: 67 UG/DL (ref 37–145)
LDLC SERPL CALC-MCNC: 95 MG/DL
POTASSIUM SERPL-SCNC: 3.9 MMOL/L (ref 3.5–5.1)
PROT SERPL-MCNC: 7.3 G/DL (ref 6.4–8.2)
SODIUM SERPL-SCNC: 139 MMOL/L (ref 136–145)
TIBC SERPL-MCNC: 243 UG/DL (ref 260–445)
TRIGL SERPL-MCNC: 114 MG/DL (ref 0–150)
VLDLC SERPL CALC-MCNC: 23 MG/DL

## 2024-03-22 PROCEDURE — 99214 OFFICE O/P EST MOD 30 MIN: CPT | Performed by: STUDENT IN AN ORGANIZED HEALTH CARE EDUCATION/TRAINING PROGRAM

## 2024-03-22 NOTE — PROGRESS NOTES
[place]   3/22/2024    Vanesa Becker (:  1985) is a 38 y.o. female, here for evaluation of the following medical concerns:    Chief Complaint   Patient presents with    6 Month Follow-Up        ASSESSMENT/ PLAN  1. Major depressive disorder with single episode, in full remission (HCC)  Continue Lexapro.  Mood has been stable overall    2. Rheumatoid arthritis of multiple sites with negative rheumatoid factor (HCC)  Continue follow-up with rheumatology    3. Crohn's disease of small intestine without complication (HCC)  Following up with GI.  Currently on Cimzia    4. ELLY (generalized anxiety disorder)  Mood stable on Lexapro.  Continue    5. Severe obesity (HCC)  Referral to weight management provided.  Continue dietary modification    6. Vitamin D deficiency  Recommended starting 1000 units of vitamin D every day     Follow-up in 6 months with labs    Lab Review   Hospital Outpatient Visit on 2024   Component Date Value    Sodium 2024 139     Potassium 2024 3.9     Chloride 2024 103     CO2 2024 26     Anion Gap 2024 10     Glucose 2024 94     BUN 2024 9     Creatinine 2024 0.6     Est, Glom Filt Rate 2024 >60     Calcium 2024 9.4     Total Protein 2024 7.3     Albumin 2024 4.4     Albumin/Globulin Ratio 2024 1.5     Total Bilirubin 2024 0.6     Alkaline Phosphatase 2024 79     ALT 2024 19     AST 2024 18     Cholesterol, Total 2024 161     Triglycerides 2024 114     HDL 2024 43     LDL Calculated 2024 95     VLDL Cholesterol Calcula* 2024 23     WBC 2024 9.8     RBC 2024 4.76     Hemoglobin 2024 13.5     Hematocrit 2024 39.2     MCV 2024 82.4     MCH 2024 28.4     MCHC 2024 34.5     RDW 2024 13.9     Platelets 2024 218     MPV 2024 8.0     Neutrophils % 2024 60.8     Lymphocytes % 2024

## 2024-05-02 ENCOUNTER — HOSPITAL ENCOUNTER (OUTPATIENT)
Dept: CARDIOLOGY | Age: 39
Discharge: HOME | End: 2024-05-02
Payer: COMMERCIAL

## 2024-05-02 DIAGNOSIS — R93.1 ABNORMAL ECHOCARDIOGRAM: ICD-10-CM

## 2024-05-02 DIAGNOSIS — R06.02 SOB (SHORTNESS OF BREATH): ICD-10-CM

## 2024-05-02 DIAGNOSIS — I35.1 NONRHEUMATIC AORTIC VALVE INSUFFICIENCY: ICD-10-CM

## 2024-05-02 PROCEDURE — 3430000000 HC RX DIAGNOSTIC RADIOPHARMACEUTICAL: Performed by: INTERNAL MEDICINE

## 2024-05-02 PROCEDURE — A9502 TC99M TETROFOSMIN: HCPCS | Performed by: INTERNAL MEDICINE

## 2024-05-02 PROCEDURE — 78452 HT MUSCLE IMAGE SPECT MULT: CPT

## 2024-05-02 PROCEDURE — 93017 CV STRESS TEST TRACING ONLY: CPT

## 2024-05-02 RX ADMIN — TETROFOSMIN 33 MILLICURIE: 1.38 INJECTION, POWDER, LYOPHILIZED, FOR SOLUTION INTRAVENOUS at 12:50

## 2024-05-03 ENCOUNTER — HOSPITAL ENCOUNTER (OUTPATIENT)
Dept: CARDIOLOGY | Age: 39
Discharge: HOME | End: 2024-05-03
Payer: COMMERCIAL

## 2024-05-03 PROCEDURE — 3430000000 HC RX DIAGNOSTIC RADIOPHARMACEUTICAL: Performed by: INTERNAL MEDICINE

## 2024-05-03 PROCEDURE — A9502 TC99M TETROFOSMIN: HCPCS | Performed by: INTERNAL MEDICINE

## 2024-05-03 RX ADMIN — TETROFOSMIN 32.7 MILLICURIE: 1.38 INJECTION, POWDER, LYOPHILIZED, FOR SOLUTION INTRAVENOUS at 12:23

## 2024-05-06 ENCOUNTER — TELEPHONE (OUTPATIENT)
Dept: CARDIOLOGY CLINIC | Age: 39
End: 2024-05-06

## 2024-05-06 NOTE — TELEPHONE ENCOUNTER
Spoke with patient. Results reviewed.         Stress test   Summary   Normal myocardial perfusion study with normal left ventricular function,   size, and wall motion.   The estimated left ventricular function is 65%.      Assessment:   Shortness of breath w/ positional component appears due to her pregnancy. Improved post baby dropped lower in abdomen. Had similar symptoms w/ first pregnancy that resolved after she delivered. Evaluated in ER and unremarkable for CHF, or ischemic hear disease. No sign CHF on exam today. No anginal type symptoms.   Abnormal echocardiogram w/ wall motion abnormality. No other signs of prior infarct. EF 55%. Uncertain significance.    Mild aortic regurgitation  Akinesis of the basal inferoseptal wall segment. Hypokinesis of inferior basal wall.  Gestational Diabetes    History of Crohn's   History of pulmonary embolus   Currently pregnant- 36 weeks and 6 days      Plan:  Recommend GXT Myoview stress test to evaluate shortness of breath and abnormal echocardiogram- to be done after delivery. R/B/A/E discussed.    Call or return to ER if symptoms worsen.   Medications reviewed including indications and pertinent side effects. Medication list updated at this visit.   Check blood pressure and heart rate at home a few times per week- keep a log with dates and times and bring to office visit   Regular exercise and following a healthy diet encouraged   Follow up with me after stress test   Call if symptoms do not improve after delivery

## 2024-06-12 DIAGNOSIS — F41.1 GAD (GENERALIZED ANXIETY DISORDER): ICD-10-CM

## 2024-06-12 RX ORDER — ESCITALOPRAM OXALATE 10 MG/1
10 TABLET ORAL DAILY
Qty: 90 TABLET | Refills: 1 | Status: SHIPPED | OUTPATIENT
Start: 2024-06-12

## 2024-06-12 NOTE — TELEPHONE ENCOUNTER
Refill request for Escitalopram ( Lexapro) 10 mg   medication.     Name of Pharmacy- [facility]       Last visit - 3/22/24     Pending visit - 9/3/24    Last refill -10/5/23      Medication Contract signed -   Last Oarrs ran-         Additional Comments

## 2024-08-25 ENCOUNTER — E-VISIT (OUTPATIENT)
Dept: PRIMARY CARE CLINIC | Age: 39
End: 2024-08-25

## 2024-08-25 DIAGNOSIS — U07.1 COVID: Primary | ICD-10-CM

## 2024-08-25 ASSESSMENT — LIFESTYLE VARIABLES
PACKS_PER_DAY: .25
SMOKING_STATUS: NO, BUT I USED TO SMOKE
SMOKING_YEARS: 3

## 2024-09-12 ENCOUNTER — APPOINTMENT (OUTPATIENT)
Dept: ULTRASOUND IMAGING | Age: 39
End: 2024-09-12

## 2024-09-12 ENCOUNTER — APPOINTMENT (OUTPATIENT)
Dept: CT IMAGING | Age: 39
End: 2024-09-12

## 2024-09-12 ENCOUNTER — HOSPITAL ENCOUNTER (INPATIENT)
Age: 39
LOS: 2 days | Discharge: HOME | End: 2024-09-14
Attending: EMERGENCY MEDICINE | Admitting: INTERNAL MEDICINE

## 2024-09-12 DIAGNOSIS — N20.0 NEPHROLITHIASIS: ICD-10-CM

## 2024-09-12 DIAGNOSIS — N20.1 URETEROLITHIASIS: Primary | ICD-10-CM

## 2024-09-12 DIAGNOSIS — R10.31 ABDOMINAL PAIN, RIGHT LOWER QUADRANT: ICD-10-CM

## 2024-09-12 DIAGNOSIS — N93.9 VAGINAL BLEEDING: ICD-10-CM

## 2024-09-12 DIAGNOSIS — N20.1 RIGHT URETERAL STONE: ICD-10-CM

## 2024-09-12 DIAGNOSIS — N23 URETERAL COLIC: ICD-10-CM

## 2024-09-12 LAB
ALBUMIN SERPL-MCNC: 4.5 G/DL (ref 3.4–5)
ALBUMIN/GLOB SERPL: 1.7 {RATIO} (ref 1.1–2.2)
ALP SERPL-CCNC: 83 U/L (ref 40–129)
ALT SERPL-CCNC: 24 U/L (ref 10–40)
ANION GAP SERPL CALCULATED.3IONS-SCNC: 9 MMOL/L (ref 3–16)
AST SERPL-CCNC: 20 U/L (ref 15–37)
BACTERIA URNS QL MICRO: ABNORMAL /HPF
BASOPHILS # BLD: 0.1 K/UL (ref 0–0.2)
BASOPHILS NFR BLD: 0.9 %
BILIRUB SERPL-MCNC: 0.3 MG/DL (ref 0–1)
BILIRUB UR QL STRIP.AUTO: NEGATIVE
BUN SERPL-MCNC: 14 MG/DL (ref 7–20)
CALCIUM SERPL-MCNC: 9.6 MG/DL (ref 8.3–10.6)
CHLORIDE SERPL-SCNC: 101 MMOL/L (ref 99–110)
CLARITY UR: CLEAR
CO2 SERPL-SCNC: 25 MMOL/L (ref 21–32)
COLOR UR: YELLOW
CREAT SERPL-MCNC: 0.6 MG/DL (ref 0.6–1.1)
DEPRECATED RDW RBC AUTO: 14.2 % (ref 12.4–15.4)
EOSINOPHIL # BLD: 0.4 K/UL (ref 0–0.6)
EOSINOPHIL NFR BLD: 3.4 %
EPI CELLS #/AREA URNS HPF: ABNORMAL /HPF (ref 0–5)
GFR SERPLBLD CREATININE-BSD FMLA CKD-EPI: >90 ML/MIN/{1.73_M2}
GLUCOSE SERPL-MCNC: 123 MG/DL (ref 70–99)
GLUCOSE UR STRIP.AUTO-MCNC: NEGATIVE MG/DL
HCG SERPL QL: NEGATIVE
HCT VFR BLD AUTO: 39.1 % (ref 36–48)
HGB BLD-MCNC: 13.4 G/DL (ref 12–16)
HGB UR QL STRIP.AUTO: ABNORMAL
KETONES UR STRIP.AUTO-MCNC: NEGATIVE MG/DL
LACTATE BLDV-SCNC: 1.2 MMOL/L (ref 0.4–2)
LEUKOCYTE ESTERASE UR QL STRIP.AUTO: ABNORMAL
LIPASE SERPL-CCNC: 24 U/L (ref 13–60)
LYMPHOCYTES # BLD: 2.6 K/UL (ref 1–5.1)
LYMPHOCYTES NFR BLD: 23.8 %
MCH RBC QN AUTO: 28.8 PG (ref 26–34)
MCHC RBC AUTO-ENTMCNC: 34.3 G/DL (ref 31–36)
MCV RBC AUTO: 83.9 FL (ref 80–100)
MONOCYTES # BLD: 0.5 K/UL (ref 0–1.3)
MONOCYTES NFR BLD: 4.4 %
NEUTROPHILS # BLD: 7.5 K/UL (ref 1.7–7.7)
NEUTROPHILS NFR BLD: 67.5 %
NITRITE UR QL STRIP.AUTO: NEGATIVE
PH UR STRIP.AUTO: 6 [PH] (ref 5–8)
PLATELET # BLD AUTO: 246 K/UL (ref 135–450)
PMV BLD AUTO: 7.1 FL (ref 5–10.5)
POTASSIUM SERPL-SCNC: 3.7 MMOL/L (ref 3.5–5.1)
PROT SERPL-MCNC: 7.2 G/DL (ref 6.4–8.2)
PROT UR STRIP.AUTO-MCNC: NEGATIVE MG/DL
RBC # BLD AUTO: 4.66 M/UL (ref 4–5.2)
RBC #/AREA URNS HPF: >100 /HPF (ref 0–4)
SODIUM SERPL-SCNC: 135 MMOL/L (ref 136–145)
SP GR UR STRIP.AUTO: 1.01 (ref 1–1.03)
TROPONIN, HIGH SENSITIVITY: <6 NG/L (ref 0–14)
UA COMPLETE W REFLEX CULTURE PNL UR: ABNORMAL
UA DIPSTICK W REFLEX MICRO PNL UR: YES
URN SPEC COLLECT METH UR: ABNORMAL
UROBILINOGEN UR STRIP-ACNC: 0.2 E.U./DL
WBC # BLD AUTO: 11.1 K/UL (ref 4–11)
WBC #/AREA URNS HPF: ABNORMAL /HPF (ref 0–5)

## 2024-09-12 PROCEDURE — 84703 CHORIONIC GONADOTROPIN ASSAY: CPT

## 2024-09-12 PROCEDURE — 96375 TX/PRO/DX INJ NEW DRUG ADDON: CPT

## 2024-09-12 PROCEDURE — 83605 ASSAY OF LACTIC ACID: CPT

## 2024-09-12 PROCEDURE — 76830 TRANSVAGINAL US NON-OB: CPT

## 2024-09-12 PROCEDURE — 83690 ASSAY OF LIPASE: CPT

## 2024-09-12 PROCEDURE — 81001 URINALYSIS AUTO W/SCOPE: CPT

## 2024-09-12 PROCEDURE — 80053 COMPREHEN METABOLIC PANEL: CPT

## 2024-09-12 PROCEDURE — 6360000002 HC RX W HCPCS

## 2024-09-12 PROCEDURE — 99285 EMERGENCY DEPT VISIT HI MDM: CPT

## 2024-09-12 PROCEDURE — 2580000003 HC RX 258

## 2024-09-12 PROCEDURE — 85025 COMPLETE CBC W/AUTO DIFF WBC: CPT

## 2024-09-12 PROCEDURE — 84484 ASSAY OF TROPONIN QUANT: CPT

## 2024-09-12 PROCEDURE — 6360000004 HC RX CONTRAST MEDICATION: Performed by: EMERGENCY MEDICINE

## 2024-09-12 PROCEDURE — 96374 THER/PROPH/DIAG INJ IV PUSH: CPT

## 2024-09-12 PROCEDURE — 1200000000 HC SEMI PRIVATE

## 2024-09-12 PROCEDURE — 74177 CT ABD & PELVIS W/CONTRAST: CPT

## 2024-09-12 RX ORDER — SODIUM CHLORIDE, SODIUM LACTATE, POTASSIUM CHLORIDE, AND CALCIUM CHLORIDE .6; .31; .03; .02 G/100ML; G/100ML; G/100ML; G/100ML
1000 INJECTION, SOLUTION INTRAVENOUS ONCE
Status: COMPLETED | OUTPATIENT
Start: 2024-09-12 | End: 2024-09-12

## 2024-09-12 RX ORDER — PROMETHAZINE HYDROCHLORIDE 25 MG/1
12.5 TABLET ORAL EVERY 6 HOURS PRN
Status: DISCONTINUED | OUTPATIENT
Start: 2024-09-12 | End: 2024-09-14 | Stop reason: HOSPADM

## 2024-09-12 RX ORDER — ONDANSETRON 2 MG/ML
4 INJECTION INTRAMUSCULAR; INTRAVENOUS EVERY 6 HOURS PRN
Status: DISCONTINUED | OUTPATIENT
Start: 2024-09-12 | End: 2024-09-13

## 2024-09-12 RX ORDER — NICOTINE 21 MG/24HR
1 PATCH, TRANSDERMAL 24 HOURS TRANSDERMAL DAILY PRN
Status: DISCONTINUED | OUTPATIENT
Start: 2024-09-12 | End: 2024-09-13

## 2024-09-12 RX ORDER — SODIUM CHLORIDE 0.9 % (FLUSH) 0.9 %
10 SYRINGE (ML) INJECTION PRN
Status: DISCONTINUED | OUTPATIENT
Start: 2024-09-12 | End: 2024-09-14 | Stop reason: HOSPADM

## 2024-09-12 RX ORDER — ESCITALOPRAM OXALATE 10 MG/1
10 TABLET ORAL DAILY
Status: DISCONTINUED | OUTPATIENT
Start: 2024-09-13 | End: 2024-09-14 | Stop reason: HOSPADM

## 2024-09-12 RX ORDER — ONDANSETRON 2 MG/ML
4 INJECTION INTRAMUSCULAR; INTRAVENOUS ONCE
Status: COMPLETED | OUTPATIENT
Start: 2024-09-12 | End: 2024-09-12

## 2024-09-12 RX ORDER — ENOXAPARIN SODIUM 100 MG/ML
30 INJECTION SUBCUTANEOUS 2 TIMES DAILY
Status: DISCONTINUED | OUTPATIENT
Start: 2024-09-13 | End: 2024-09-14 | Stop reason: HOSPADM

## 2024-09-12 RX ORDER — SODIUM CHLORIDE 0.9 % (FLUSH) 0.9 %
10 SYRINGE (ML) INJECTION EVERY 12 HOURS SCHEDULED
Status: DISCONTINUED | OUTPATIENT
Start: 2024-09-13 | End: 2024-09-14 | Stop reason: HOSPADM

## 2024-09-12 RX ORDER — LANOLIN ALCOHOL/MO/W.PET/CERES
3 CREAM (GRAM) TOPICAL NIGHTLY PRN
Status: DISCONTINUED | OUTPATIENT
Start: 2024-09-12 | End: 2024-09-14 | Stop reason: HOSPADM

## 2024-09-12 RX ORDER — KETOROLAC TROMETHAMINE 30 MG/ML
30 INJECTION, SOLUTION INTRAMUSCULAR; INTRAVENOUS ONCE
Status: COMPLETED | OUTPATIENT
Start: 2024-09-12 | End: 2024-09-12

## 2024-09-12 RX ORDER — ACETAMINOPHEN 650 MG/1
650 SUPPOSITORY RECTAL EVERY 6 HOURS PRN
Status: DISCONTINUED | OUTPATIENT
Start: 2024-09-12 | End: 2024-09-14 | Stop reason: HOSPADM

## 2024-09-12 RX ORDER — IOPAMIDOL 755 MG/ML
75 INJECTION, SOLUTION INTRAVASCULAR
Status: COMPLETED | OUTPATIENT
Start: 2024-09-12 | End: 2024-09-12

## 2024-09-12 RX ORDER — SODIUM CHLORIDE 9 MG/ML
INJECTION, SOLUTION INTRAVENOUS CONTINUOUS
Status: ACTIVE | OUTPATIENT
Start: 2024-09-13 | End: 2024-09-13

## 2024-09-12 RX ORDER — ACETAMINOPHEN 325 MG/1
650 TABLET ORAL EVERY 6 HOURS PRN
Status: DISCONTINUED | OUTPATIENT
Start: 2024-09-12 | End: 2024-09-14 | Stop reason: HOSPADM

## 2024-09-12 RX ORDER — MORPHINE SULFATE 4 MG/ML
4 INJECTION, SOLUTION INTRAMUSCULAR; INTRAVENOUS
Status: COMPLETED | OUTPATIENT
Start: 2024-09-12 | End: 2024-09-12

## 2024-09-12 RX ORDER — SODIUM CHLORIDE 9 MG/ML
INJECTION, SOLUTION INTRAVENOUS PRN
Status: DISCONTINUED | OUTPATIENT
Start: 2024-09-12 | End: 2024-09-14 | Stop reason: HOSPADM

## 2024-09-12 RX ORDER — HYDROMORPHONE HYDROCHLORIDE 1 MG/ML
1 INJECTION, SOLUTION INTRAMUSCULAR; INTRAVENOUS; SUBCUTANEOUS
Status: DISCONTINUED | OUTPATIENT
Start: 2024-09-12 | End: 2024-09-13

## 2024-09-12 RX ADMIN — IOPAMIDOL 75 ML: 755 INJECTION, SOLUTION INTRAVENOUS at 20:29

## 2024-09-12 RX ADMIN — KETOROLAC TROMETHAMINE 30 MG: 30 INJECTION, SOLUTION INTRAMUSCULAR at 21:50

## 2024-09-12 RX ADMIN — ONDANSETRON 4 MG: 2 INJECTION INTRAMUSCULAR; INTRAVENOUS at 18:51

## 2024-09-12 RX ADMIN — SODIUM CHLORIDE, POTASSIUM CHLORIDE, SODIUM LACTATE AND CALCIUM CHLORIDE 1000 ML: 600; 310; 30; 20 INJECTION, SOLUTION INTRAVENOUS at 18:51

## 2024-09-12 RX ADMIN — HYDROMORPHONE HYDROCHLORIDE 0.5 MG: 1 INJECTION, SOLUTION INTRAMUSCULAR; INTRAVENOUS; SUBCUTANEOUS at 19:09

## 2024-09-12 RX ADMIN — MORPHINE SULFATE 4 MG: 4 INJECTION, SOLUTION INTRAMUSCULAR; INTRAVENOUS at 18:51

## 2024-09-12 ASSESSMENT — PAIN SCALES - GENERAL
PAINLEVEL_OUTOF10: 3
PAINLEVEL_OUTOF10: 10
PAINLEVEL_OUTOF10: 7
PAINLEVEL_OUTOF10: 3
PAINLEVEL_OUTOF10: 7

## 2024-09-12 ASSESSMENT — PAIN DESCRIPTION - LOCATION
LOCATION: ABDOMEN;VAGINA
LOCATION: VAGINA
LOCATION: BACK

## 2024-09-12 ASSESSMENT — PAIN - FUNCTIONAL ASSESSMENT: PAIN_FUNCTIONAL_ASSESSMENT: 0-10

## 2024-09-13 ENCOUNTER — ANESTHESIA (OUTPATIENT)
Dept: OPERATING ROOM | Age: 39
End: 2024-09-13

## 2024-09-13 ENCOUNTER — ANESTHESIA EVENT (OUTPATIENT)
Dept: OPERATING ROOM | Age: 39
End: 2024-09-13

## 2024-09-13 ENCOUNTER — APPOINTMENT (OUTPATIENT)
Dept: GENERAL RADIOLOGY | Age: 39
End: 2024-09-13

## 2024-09-13 LAB
ALBUMIN SERPL-MCNC: 3.8 G/DL (ref 3.4–5)
ALBUMIN/GLOB SERPL: 1.6 {RATIO} (ref 1.1–2.2)
ALP SERPL-CCNC: 67 U/L (ref 40–129)
ALT SERPL-CCNC: 24 U/L (ref 10–40)
ANION GAP SERPL CALCULATED.3IONS-SCNC: 5 MMOL/L (ref 3–16)
AST SERPL-CCNC: 28 U/L (ref 15–37)
BASOPHILS # BLD: 0 K/UL (ref 0–0.2)
BASOPHILS NFR BLD: 0.5 %
BILIRUB SERPL-MCNC: 0.4 MG/DL (ref 0–1)
BUN SERPL-MCNC: 10 MG/DL (ref 7–20)
CALCIUM SERPL-MCNC: 8.8 MG/DL (ref 8.3–10.6)
CHLORIDE SERPL-SCNC: 105 MMOL/L (ref 99–110)
CO2 SERPL-SCNC: 28 MMOL/L (ref 21–32)
CREAT SERPL-MCNC: 0.6 MG/DL (ref 0.6–1.1)
DEPRECATED RDW RBC AUTO: 14.4 % (ref 12.4–15.4)
EKG ATRIAL RATE: 65 BPM
EKG DIAGNOSIS: NORMAL
EKG P AXIS: 45 DEGREES
EKG P-R INTERVAL: 164 MS
EKG Q-T INTERVAL: 440 MS
EKG QRS DURATION: 90 MS
EKG QTC CALCULATION (BAZETT): 457 MS
EKG R AXIS: 8 DEGREES
EKG T AXIS: 47 DEGREES
EKG VENTRICULAR RATE: 65 BPM
EOSINOPHIL # BLD: 0.4 K/UL (ref 0–0.6)
EOSINOPHIL NFR BLD: 4.5 %
GFR SERPLBLD CREATININE-BSD FMLA CKD-EPI: >90 ML/MIN/{1.73_M2}
GLUCOSE SERPL-MCNC: 105 MG/DL (ref 70–99)
HCT VFR BLD AUTO: 37 % (ref 36–48)
HGB BLD-MCNC: 12.2 G/DL (ref 12–16)
LYMPHOCYTES # BLD: 2.9 K/UL (ref 1–5.1)
LYMPHOCYTES NFR BLD: 32.4 %
MCH RBC QN AUTO: 27.8 PG (ref 26–34)
MCHC RBC AUTO-ENTMCNC: 32.9 G/DL (ref 31–36)
MCV RBC AUTO: 84.4 FL (ref 80–100)
MONOCYTES # BLD: 0.6 K/UL (ref 0–1.3)
MONOCYTES NFR BLD: 6.6 %
NEUTROPHILS # BLD: 5 K/UL (ref 1.7–7.7)
NEUTROPHILS NFR BLD: 56 %
PLATELET # BLD AUTO: 180 K/UL (ref 135–450)
PMV BLD AUTO: 7.1 FL (ref 5–10.5)
POTASSIUM SERPL-SCNC: 3.7 MMOL/L (ref 3.5–5.1)
PROT SERPL-MCNC: 6.2 G/DL (ref 6.4–8.2)
RBC # BLD AUTO: 4.38 M/UL (ref 4–5.2)
SODIUM SERPL-SCNC: 138 MMOL/L (ref 136–145)
TROPONIN, HIGH SENSITIVITY: <6 NG/L (ref 0–14)
WBC # BLD AUTO: 9 K/UL (ref 4–11)

## 2024-09-13 PROCEDURE — 3700000001 HC ADD 15 MINUTES (ANESTHESIA): Performed by: UROLOGY

## 2024-09-13 PROCEDURE — 82365 CALCULUS SPECTROSCOPY: CPT

## 2024-09-13 PROCEDURE — 6370000000 HC RX 637 (ALT 250 FOR IP): Performed by: INTERNAL MEDICINE

## 2024-09-13 PROCEDURE — 6360000002 HC RX W HCPCS: Performed by: UROLOGY

## 2024-09-13 PROCEDURE — 2500000003 HC RX 250 WO HCPCS: Performed by: ANESTHESIOLOGY

## 2024-09-13 PROCEDURE — 84484 ASSAY OF TROPONIN QUANT: CPT

## 2024-09-13 PROCEDURE — 2580000003 HC RX 258: Performed by: INTERNAL MEDICINE

## 2024-09-13 PROCEDURE — 6370000000 HC RX 637 (ALT 250 FOR IP): Performed by: UROLOGY

## 2024-09-13 PROCEDURE — 6360000002 HC RX W HCPCS: Performed by: INTERNAL MEDICINE

## 2024-09-13 PROCEDURE — 2720000010 HC SURG SUPPLY STERILE: Performed by: UROLOGY

## 2024-09-13 PROCEDURE — 6360000002 HC RX W HCPCS: Performed by: ANESTHESIOLOGY

## 2024-09-13 PROCEDURE — 2580000003 HC RX 258: Performed by: UROLOGY

## 2024-09-13 PROCEDURE — 6360000002 HC RX W HCPCS: Performed by: NURSE PRACTITIONER

## 2024-09-13 PROCEDURE — 2709999900 HC NON-CHARGEABLE SUPPLY: Performed by: UROLOGY

## 2024-09-13 PROCEDURE — 7100000000 HC PACU RECOVERY - FIRST 15 MIN: Performed by: UROLOGY

## 2024-09-13 PROCEDURE — 0TC68ZZ EXTIRPATION OF MATTER FROM RIGHT URETER, VIA NATURAL OR ARTIFICIAL OPENING ENDOSCOPIC: ICD-10-PCS | Performed by: UROLOGY

## 2024-09-13 PROCEDURE — 0T768DZ DILATION OF RIGHT URETER WITH INTRALUMINAL DEVICE, VIA NATURAL OR ARTIFICIAL OPENING ENDOSCOPIC: ICD-10-PCS | Performed by: UROLOGY

## 2024-09-13 PROCEDURE — 88300 SURGICAL PATH GROSS: CPT

## 2024-09-13 PROCEDURE — 2580000003 HC RX 258: Performed by: ANESTHESIOLOGY

## 2024-09-13 PROCEDURE — 36415 COLL VENOUS BLD VENIPUNCTURE: CPT

## 2024-09-13 PROCEDURE — 3600000014 HC SURGERY LEVEL 4 ADDTL 15MIN: Performed by: UROLOGY

## 2024-09-13 PROCEDURE — 93010 ELECTROCARDIOGRAM REPORT: CPT | Performed by: INTERNAL MEDICINE

## 2024-09-13 PROCEDURE — 3700000000 HC ANESTHESIA ATTENDED CARE: Performed by: UROLOGY

## 2024-09-13 PROCEDURE — 7100000001 HC PACU RECOVERY - ADDTL 15 MIN: Performed by: UROLOGY

## 2024-09-13 PROCEDURE — 85025 COMPLETE CBC W/AUTO DIFF WBC: CPT

## 2024-09-13 PROCEDURE — 80053 COMPREHEN METABOLIC PANEL: CPT

## 2024-09-13 PROCEDURE — C2617 STENT, NON-COR, TEM W/O DEL: HCPCS | Performed by: UROLOGY

## 2024-09-13 PROCEDURE — 1200000000 HC SEMI PRIVATE

## 2024-09-13 PROCEDURE — 93005 ELECTROCARDIOGRAM TRACING: CPT | Performed by: INTERNAL MEDICINE

## 2024-09-13 PROCEDURE — C1769 GUIDE WIRE: HCPCS | Performed by: UROLOGY

## 2024-09-13 PROCEDURE — 3600000004 HC SURGERY LEVEL 4 BASE: Performed by: UROLOGY

## 2024-09-13 PROCEDURE — 74018 RADEX ABDOMEN 1 VIEW: CPT

## 2024-09-13 PROCEDURE — 99253 IP/OBS CNSLTJ NEW/EST LOW 45: CPT | Performed by: OBSTETRICS & GYNECOLOGY

## 2024-09-13 PROCEDURE — 6370000000 HC RX 637 (ALT 250 FOR IP): Performed by: NURSE PRACTITIONER

## 2024-09-13 DEVICE — URETERAL STENT
Type: IMPLANTABLE DEVICE | Site: URETER | Status: FUNCTIONAL
Brand: CONTOUR™

## 2024-09-13 RX ORDER — FENTANYL CITRATE 50 UG/ML
INJECTION, SOLUTION INTRAMUSCULAR; INTRAVENOUS
Status: DISCONTINUED | OUTPATIENT
Start: 2024-09-13 | End: 2024-09-13 | Stop reason: SDUPTHER

## 2024-09-13 RX ORDER — OXYCODONE HYDROCHLORIDE 5 MG/1
10 TABLET ORAL PRN
Status: DISCONTINUED | OUTPATIENT
Start: 2024-09-13 | End: 2024-09-13 | Stop reason: HOSPADM

## 2024-09-13 RX ORDER — CEFAZOLIN SODIUM IN 0.9 % NACL 2 G/100 ML
2000 PLASTIC BAG, INJECTION (ML) INTRAVENOUS EVERY 8 HOURS
Status: COMPLETED | OUTPATIENT
Start: 2024-09-13 | End: 2024-09-14

## 2024-09-13 RX ORDER — DEXAMETHASONE SODIUM PHOSPHATE 4 MG/ML
INJECTION, SOLUTION INTRA-ARTICULAR; INTRALESIONAL; INTRAMUSCULAR; INTRAVENOUS; SOFT TISSUE
Status: DISCONTINUED | OUTPATIENT
Start: 2024-09-13 | End: 2024-09-13 | Stop reason: SDUPTHER

## 2024-09-13 RX ORDER — MORPHINE SULFATE 2 MG/ML
2 INJECTION, SOLUTION INTRAMUSCULAR; INTRAVENOUS EVERY 4 HOURS PRN
Status: DISCONTINUED | OUTPATIENT
Start: 2024-09-13 | End: 2024-09-13

## 2024-09-13 RX ORDER — DIPHENHYDRAMINE HYDROCHLORIDE 50 MG/ML
6.25 INJECTION INTRAMUSCULAR; INTRAVENOUS
Status: COMPLETED | OUTPATIENT
Start: 2024-09-13 | End: 2024-09-13

## 2024-09-13 RX ORDER — HYDROCODONE BITARTRATE AND ACETAMINOPHEN 5; 325 MG/1; MG/1
1 TABLET ORAL EVERY 6 HOURS PRN
Status: DISCONTINUED | OUTPATIENT
Start: 2024-09-13 | End: 2024-09-14

## 2024-09-13 RX ORDER — LIDOCAINE HYDROCHLORIDE 20 MG/ML
INJECTION, SOLUTION INFILTRATION; PERINEURAL
Status: DISCONTINUED | OUTPATIENT
Start: 2024-09-13 | End: 2024-09-13 | Stop reason: SDUPTHER

## 2024-09-13 RX ORDER — MEPERIDINE HYDROCHLORIDE 50 MG/ML
12.5 INJECTION INTRAMUSCULAR; INTRAVENOUS; SUBCUTANEOUS EVERY 5 MIN PRN
Status: DISCONTINUED | OUTPATIENT
Start: 2024-09-13 | End: 2024-09-13 | Stop reason: HOSPADM

## 2024-09-13 RX ORDER — ONDANSETRON 2 MG/ML
4 INJECTION INTRAMUSCULAR; INTRAVENOUS EVERY 6 HOURS PRN
Status: DISCONTINUED | OUTPATIENT
Start: 2024-09-13 | End: 2024-09-13

## 2024-09-13 RX ORDER — OXYCODONE HYDROCHLORIDE 5 MG/1
5 TABLET ORAL PRN
Status: DISCONTINUED | OUTPATIENT
Start: 2024-09-13 | End: 2024-09-13 | Stop reason: HOSPADM

## 2024-09-13 RX ORDER — SODIUM CHLORIDE 9 MG/ML
INJECTION, SOLUTION INTRAVENOUS PRN
Status: DISCONTINUED | OUTPATIENT
Start: 2024-09-13 | End: 2024-09-13 | Stop reason: HOSPADM

## 2024-09-13 RX ORDER — OXYMETAZOLINE HYDROCHLORIDE 0.05 G/100ML
2 SPRAY NASAL 2 TIMES DAILY PRN
COMMUNITY

## 2024-09-13 RX ORDER — IBUPROFEN 400 MG/1
800 TABLET, FILM COATED ORAL EVERY 8 HOURS PRN
Status: DISCONTINUED | OUTPATIENT
Start: 2024-09-13 | End: 2024-09-13

## 2024-09-13 RX ORDER — ONDANSETRON 2 MG/ML
4 INJECTION INTRAMUSCULAR; INTRAVENOUS ONCE
Status: COMPLETED | OUTPATIENT
Start: 2024-09-13 | End: 2024-09-13

## 2024-09-13 RX ORDER — SODIUM CHLORIDE, SODIUM LACTATE, POTASSIUM CHLORIDE, CALCIUM CHLORIDE 600; 310; 30; 20 MG/100ML; MG/100ML; MG/100ML; MG/100ML
INJECTION, SOLUTION INTRAVENOUS
Status: DISCONTINUED | OUTPATIENT
Start: 2024-09-13 | End: 2024-09-13 | Stop reason: SDUPTHER

## 2024-09-13 RX ORDER — PHENAZOPYRIDINE HYDROCHLORIDE 100 MG/1
200 TABLET, FILM COATED ORAL
Status: DISCONTINUED | OUTPATIENT
Start: 2024-09-13 | End: 2024-09-14 | Stop reason: HOSPADM

## 2024-09-13 RX ORDER — CEPHALEXIN 500 MG/1
500 CAPSULE ORAL 2 TIMES DAILY
Qty: 10 CAPSULE | Refills: 0 | Status: SHIPPED | OUTPATIENT
Start: 2024-09-13 | End: 2024-09-14 | Stop reason: HOSPADM

## 2024-09-13 RX ORDER — SODIUM CHLORIDE 0.9 % (FLUSH) 0.9 %
5-40 SYRINGE (ML) INJECTION PRN
Status: DISCONTINUED | OUTPATIENT
Start: 2024-09-13 | End: 2024-09-13 | Stop reason: HOSPADM

## 2024-09-13 RX ORDER — ONDANSETRON 2 MG/ML
INJECTION INTRAMUSCULAR; INTRAVENOUS
Status: DISCONTINUED | OUTPATIENT
Start: 2024-09-13 | End: 2024-09-13 | Stop reason: SDUPTHER

## 2024-09-13 RX ORDER — KETOROLAC TROMETHAMINE 30 MG/ML
30 INJECTION, SOLUTION INTRAMUSCULAR; INTRAVENOUS ONCE
Status: COMPLETED | OUTPATIENT
Start: 2024-09-13 | End: 2024-09-13

## 2024-09-13 RX ORDER — MIDAZOLAM HYDROCHLORIDE 1 MG/ML
2 INJECTION INTRAMUSCULAR; INTRAVENOUS
Status: DISCONTINUED | OUTPATIENT
Start: 2024-09-13 | End: 2024-09-13 | Stop reason: HOSPADM

## 2024-09-13 RX ORDER — KETOROLAC TROMETHAMINE 30 MG/ML
15 INJECTION, SOLUTION INTRAMUSCULAR; INTRAVENOUS EVERY 6 HOURS PRN
Status: DISCONTINUED | OUTPATIENT
Start: 2024-09-13 | End: 2024-09-14 | Stop reason: HOSPADM

## 2024-09-13 RX ORDER — PROPOFOL 10 MG/ML
INJECTION, EMULSION INTRAVENOUS
Status: DISCONTINUED | OUTPATIENT
Start: 2024-09-13 | End: 2024-09-13 | Stop reason: SDUPTHER

## 2024-09-13 RX ORDER — HYDROCODONE BITARTRATE AND ACETAMINOPHEN 5; 325 MG/1; MG/1
1 TABLET ORAL EVERY 6 HOURS PRN
Qty: 12 TABLET | Refills: 0 | Status: SHIPPED | OUTPATIENT
Start: 2024-09-13 | End: 2024-09-14 | Stop reason: HOSPADM

## 2024-09-13 RX ORDER — NALOXONE HYDROCHLORIDE 0.4 MG/ML
INJECTION, SOLUTION INTRAMUSCULAR; INTRAVENOUS; SUBCUTANEOUS PRN
Status: DISCONTINUED | OUTPATIENT
Start: 2024-09-13 | End: 2024-09-13 | Stop reason: HOSPADM

## 2024-09-13 RX ORDER — SODIUM CHLORIDE 0.9 % (FLUSH) 0.9 %
5-40 SYRINGE (ML) INJECTION EVERY 12 HOURS SCHEDULED
Status: DISCONTINUED | OUTPATIENT
Start: 2024-09-13 | End: 2024-09-13 | Stop reason: HOSPADM

## 2024-09-13 RX ORDER — TAMSULOSIN HYDROCHLORIDE 0.4 MG/1
0.4 CAPSULE ORAL DAILY
Status: DISCONTINUED | OUTPATIENT
Start: 2024-09-13 | End: 2024-09-14 | Stop reason: HOSPADM

## 2024-09-13 RX ORDER — MULTIVITAMIN WITH IRON
100 TABLET ORAL DAILY
COMMUNITY

## 2024-09-13 RX ADMIN — HYDROMORPHONE HYDROCHLORIDE 0.5 MG: 1 INJECTION, SOLUTION INTRAMUSCULAR; INTRAVENOUS; SUBCUTANEOUS at 11:38

## 2024-09-13 RX ADMIN — ENOXAPARIN SODIUM 30 MG: 100 INJECTION SUBCUTANEOUS at 13:28

## 2024-09-13 RX ADMIN — PROMETHAZINE HYDROCHLORIDE 12.5 MG: 25 TABLET ORAL at 00:46

## 2024-09-13 RX ADMIN — HYDROMORPHONE HYDROCHLORIDE 0.5 MG: 1 INJECTION, SOLUTION INTRAMUSCULAR; INTRAVENOUS; SUBCUTANEOUS at 03:15

## 2024-09-13 RX ADMIN — DIPHENHYDRAMINE HYDROCHLORIDE 6.5 MG: 50 INJECTION INTRAMUSCULAR; INTRAVENOUS at 11:10

## 2024-09-13 RX ADMIN — HYDROMORPHONE HYDROCHLORIDE 0.5 MG: 1 INJECTION, SOLUTION INTRAMUSCULAR; INTRAVENOUS; SUBCUTANEOUS at 23:12

## 2024-09-13 RX ADMIN — HYDROCODONE BITARTRATE AND ACETAMINOPHEN 1 TABLET: 5; 325 TABLET ORAL at 13:27

## 2024-09-13 RX ADMIN — LIDOCAINE HYDROCHLORIDE 80 MG: 20 INJECTION, SOLUTION INFILTRATION; PERINEURAL at 10:16

## 2024-09-13 RX ADMIN — KETOROLAC TROMETHAMINE 30 MG: 30 INJECTION, SOLUTION INTRAMUSCULAR at 12:17

## 2024-09-13 RX ADMIN — FENTANYL CITRATE 50 MCG: 50 INJECTION, SOLUTION INTRAMUSCULAR; INTRAVENOUS at 10:23

## 2024-09-13 RX ADMIN — Medication 10 ML: at 21:44

## 2024-09-13 RX ADMIN — ONDANSETRON 4 MG: 2 INJECTION INTRAMUSCULAR; INTRAVENOUS at 10:22

## 2024-09-13 RX ADMIN — PROPOFOL 200 MG: 10 INJECTION, EMULSION INTRAVENOUS at 10:16

## 2024-09-13 RX ADMIN — HYDROCODONE BITARTRATE AND ACETAMINOPHEN 1 TABLET: 5; 325 TABLET ORAL at 21:41

## 2024-09-13 RX ADMIN — ESCITALOPRAM OXALATE 10 MG: 10 TABLET ORAL at 13:27

## 2024-09-13 RX ADMIN — HYDROMORPHONE HYDROCHLORIDE 1 MG: 1 INJECTION, SOLUTION INTRAMUSCULAR; INTRAVENOUS; SUBCUTANEOUS at 00:08

## 2024-09-13 RX ADMIN — PHENAZOPYRIDINE 200 MG: 100 TABLET ORAL at 16:42

## 2024-09-13 RX ADMIN — Medication 3 MG: at 00:46

## 2024-09-13 RX ADMIN — HYDROMORPHONE HYDROCHLORIDE 0.5 MG: 1 INJECTION, SOLUTION INTRAMUSCULAR; INTRAVENOUS; SUBCUTANEOUS at 11:48

## 2024-09-13 RX ADMIN — Medication 10 ML: at 13:29

## 2024-09-13 RX ADMIN — SODIUM CHLORIDE: 9 INJECTION, SOLUTION INTRAVENOUS at 18:13

## 2024-09-13 RX ADMIN — DEXAMETHASONE SODIUM PHOSPHATE 8 MG: 4 INJECTION, SOLUTION INTRAMUSCULAR; INTRAVENOUS at 10:22

## 2024-09-13 RX ADMIN — MORPHINE SULFATE 2 MG: 2 INJECTION, SOLUTION INTRAMUSCULAR; INTRAVENOUS at 19:15

## 2024-09-13 RX ADMIN — HYDROMORPHONE HYDROCHLORIDE 0.5 MG: 1 INJECTION, SOLUTION INTRAMUSCULAR; INTRAVENOUS; SUBCUTANEOUS at 11:28

## 2024-09-13 RX ADMIN — HYDROMORPHONE HYDROCHLORIDE 0.5 MG: 1 INJECTION, SOLUTION INTRAMUSCULAR; INTRAVENOUS; SUBCUTANEOUS at 11:13

## 2024-09-13 RX ADMIN — FENTANYL CITRATE 50 MCG: 50 INJECTION, SOLUTION INTRAMUSCULAR; INTRAVENOUS at 10:16

## 2024-09-13 RX ADMIN — Medication 2000 MG: at 18:14

## 2024-09-13 RX ADMIN — KETOROLAC TROMETHAMINE 30 MG: 30 INJECTION, SOLUTION INTRAMUSCULAR at 21:43

## 2024-09-13 RX ADMIN — KETOROLAC TROMETHAMINE 15 MG: 30 INJECTION, SOLUTION INTRAMUSCULAR at 18:05

## 2024-09-13 RX ADMIN — Medication 2000 MG: at 10:18

## 2024-09-13 RX ADMIN — ONDANSETRON 4 MG: 2 INJECTION INTRAMUSCULAR; INTRAVENOUS at 11:25

## 2024-09-13 RX ADMIN — Medication 3 MG: at 23:12

## 2024-09-13 RX ADMIN — SODIUM CHLORIDE: 9 INJECTION, SOLUTION INTRAVENOUS at 00:28

## 2024-09-13 RX ADMIN — TAMSULOSIN HYDROCHLORIDE 0.4 MG: 0.4 CAPSULE ORAL at 06:35

## 2024-09-13 RX ADMIN — SODIUM CHLORIDE, SODIUM LACTATE, POTASSIUM CHLORIDE, AND CALCIUM CHLORIDE: .6; .31; .03; .02 INJECTION, SOLUTION INTRAVENOUS at 10:09

## 2024-09-13 RX ADMIN — ENOXAPARIN SODIUM 30 MG: 100 INJECTION SUBCUTANEOUS at 21:41

## 2024-09-13 ASSESSMENT — PAIN DESCRIPTION - LOCATION
LOCATION: ABDOMEN
LOCATION: ABDOMEN;BACK
LOCATION: ABDOMEN

## 2024-09-13 ASSESSMENT — PAIN DESCRIPTION - ORIENTATION
ORIENTATION: RIGHT
ORIENTATION: RIGHT
ORIENTATION: RIGHT;LOWER
ORIENTATION: RIGHT
ORIENTATION: RIGHT;LOWER
ORIENTATION: RIGHT;LOWER

## 2024-09-13 ASSESSMENT — PAIN SCALES - GENERAL
PAINLEVEL_OUTOF10: 7
PAINLEVEL_OUTOF10: 8
PAINLEVEL_OUTOF10: 7
PAINLEVEL_OUTOF10: 8
PAINLEVEL_OUTOF10: 7
PAINLEVEL_OUTOF10: 8
PAINLEVEL_OUTOF10: 5
PAINLEVEL_OUTOF10: 5
PAINLEVEL_OUTOF10: 7
PAINLEVEL_OUTOF10: 7
PAINLEVEL_OUTOF10: 4
PAINLEVEL_OUTOF10: 7
PAINLEVEL_OUTOF10: 5
PAINLEVEL_OUTOF10: 5
PAINLEVEL_OUTOF10: 4

## 2024-09-13 ASSESSMENT — PAIN DESCRIPTION - DESCRIPTORS
DESCRIPTORS: CRAMPING
DESCRIPTORS: STABBING
DESCRIPTORS: STABBING
DESCRIPTORS: CRAMPING;SPASM
DESCRIPTORS: CRAMPING;STABBING
DESCRIPTORS: SPASM;ACHING;BURNING

## 2024-09-13 ASSESSMENT — PAIN - FUNCTIONAL ASSESSMENT
PAIN_FUNCTIONAL_ASSESSMENT: 0-10
PAIN_FUNCTIONAL_ASSESSMENT: ACTIVITIES ARE NOT PREVENTED
PAIN_FUNCTIONAL_ASSESSMENT: PREVENTS OR INTERFERES SOME ACTIVE ACTIVITIES AND ADLS

## 2024-09-13 ASSESSMENT — ENCOUNTER SYMPTOMS: SHORTNESS OF BREATH: 1

## 2024-09-14 VITALS
OXYGEN SATURATION: 98 % | TEMPERATURE: 97.8 F | RESPIRATION RATE: 18 BRPM | BODY MASS INDEX: 45.99 KG/M2 | WEIGHT: 293 LBS | SYSTOLIC BLOOD PRESSURE: 107 MMHG | DIASTOLIC BLOOD PRESSURE: 70 MMHG | HEART RATE: 72 BPM | HEIGHT: 67 IN

## 2024-09-14 LAB
ALBUMIN SERPL-MCNC: 4 G/DL (ref 3.4–5)
ALBUMIN/GLOB SERPL: 1.5 {RATIO} (ref 1.1–2.2)
ALP SERPL-CCNC: 79 U/L (ref 40–129)
ALT SERPL-CCNC: 53 U/L (ref 10–40)
ANION GAP SERPL CALCULATED.3IONS-SCNC: 12 MMOL/L (ref 3–16)
AST SERPL-CCNC: 40 U/L (ref 15–37)
BASOPHILS # BLD: 0 K/UL (ref 0–0.2)
BASOPHILS NFR BLD: 0.2 %
BILIRUB SERPL-MCNC: <0.2 MG/DL (ref 0–1)
BUN SERPL-MCNC: 9 MG/DL (ref 7–20)
CALCIUM SERPL-MCNC: 9 MG/DL (ref 8.3–10.6)
CHLORIDE SERPL-SCNC: 104 MMOL/L (ref 99–110)
CO2 SERPL-SCNC: 23 MMOL/L (ref 21–32)
CREAT SERPL-MCNC: 0.6 MG/DL (ref 0.6–1.1)
DEPRECATED RDW RBC AUTO: 14 % (ref 12.4–15.4)
EOSINOPHIL # BLD: 0 K/UL (ref 0–0.6)
EOSINOPHIL NFR BLD: 0.2 %
GFR SERPLBLD CREATININE-BSD FMLA CKD-EPI: >90 ML/MIN/{1.73_M2}
GLUCOSE SERPL-MCNC: 112 MG/DL (ref 70–99)
HCT VFR BLD AUTO: 35.2 % (ref 36–48)
HGB BLD-MCNC: 11.9 G/DL (ref 12–16)
LYMPHOCYTES # BLD: 1.8 K/UL (ref 1–5.1)
LYMPHOCYTES NFR BLD: 17.6 %
MCH RBC QN AUTO: 28.3 PG (ref 26–34)
MCHC RBC AUTO-ENTMCNC: 33.8 G/DL (ref 31–36)
MCV RBC AUTO: 83.8 FL (ref 80–100)
MONOCYTES # BLD: 0.6 K/UL (ref 0–1.3)
MONOCYTES NFR BLD: 5.7 %
NEUTROPHILS # BLD: 7.7 K/UL (ref 1.7–7.7)
NEUTROPHILS NFR BLD: 76.3 %
PLATELET # BLD AUTO: 206 K/UL (ref 135–450)
PMV BLD AUTO: 7.6 FL (ref 5–10.5)
POTASSIUM SERPL-SCNC: 3.7 MMOL/L (ref 3.5–5.1)
PROT SERPL-MCNC: 6.6 G/DL (ref 6.4–8.2)
RBC # BLD AUTO: 4.2 M/UL (ref 4–5.2)
SODIUM SERPL-SCNC: 139 MMOL/L (ref 136–145)
TROPONIN, HIGH SENSITIVITY: <6 NG/L (ref 0–14)
WBC # BLD AUTO: 10.1 K/UL (ref 4–11)

## 2024-09-14 PROCEDURE — 6360000002 HC RX W HCPCS: Performed by: UROLOGY

## 2024-09-14 PROCEDURE — 6370000000 HC RX 637 (ALT 250 FOR IP): Performed by: UROLOGY

## 2024-09-14 PROCEDURE — 85025 COMPLETE CBC W/AUTO DIFF WBC: CPT

## 2024-09-14 PROCEDURE — 80053 COMPREHEN METABOLIC PANEL: CPT

## 2024-09-14 PROCEDURE — 2580000003 HC RX 258: Performed by: UROLOGY

## 2024-09-14 PROCEDURE — 36415 COLL VENOUS BLD VENIPUNCTURE: CPT

## 2024-09-14 PROCEDURE — 84484 ASSAY OF TROPONIN QUANT: CPT

## 2024-09-14 PROCEDURE — 6360000002 HC RX W HCPCS: Performed by: NURSE PRACTITIONER

## 2024-09-14 PROCEDURE — 6370000000 HC RX 637 (ALT 250 FOR IP): Performed by: NURSE PRACTITIONER

## 2024-09-14 RX ORDER — HYDROCODONE BITARTRATE AND ACETAMINOPHEN 5; 325 MG/1; MG/1
1-2 TABLET ORAL EVERY 6 HOURS PRN
Qty: 24 TABLET | Refills: 0 | Status: SHIPPED | OUTPATIENT
Start: 2024-09-14 | End: 2024-09-17

## 2024-09-14 RX ORDER — CEPHALEXIN 500 MG/1
500 CAPSULE ORAL EVERY 12 HOURS SCHEDULED
Qty: 10 CAPSULE | Refills: 0 | Status: SHIPPED | OUTPATIENT
Start: 2024-09-14 | End: 2024-09-19

## 2024-09-14 RX ORDER — KETOROLAC TROMETHAMINE 30 MG/ML
15 INJECTION, SOLUTION INTRAMUSCULAR; INTRAVENOUS EVERY 6 HOURS
Status: DISCONTINUED | OUTPATIENT
Start: 2024-09-14 | End: 2024-09-14

## 2024-09-14 RX ORDER — KETOROLAC TROMETHAMINE 30 MG/ML
15 INJECTION, SOLUTION INTRAMUSCULAR; INTRAVENOUS EVERY 6 HOURS
Status: COMPLETED | OUTPATIENT
Start: 2024-09-14 | End: 2024-09-14

## 2024-09-14 RX ORDER — HYDROCODONE BITARTRATE AND ACETAMINOPHEN 5; 325 MG/1; MG/1
2 TABLET ORAL EVERY 6 HOURS PRN
Status: DISCONTINUED | OUTPATIENT
Start: 2024-09-14 | End: 2024-09-14 | Stop reason: HOSPADM

## 2024-09-14 RX ORDER — HYDROCODONE BITARTRATE AND ACETAMINOPHEN 5; 325 MG/1; MG/1
1 TABLET ORAL EVERY 6 HOURS PRN
Status: DISCONTINUED | OUTPATIENT
Start: 2024-09-14 | End: 2024-09-14 | Stop reason: HOSPADM

## 2024-09-14 RX ORDER — PHENAZOPYRIDINE HYDROCHLORIDE 200 MG/1
200 TABLET, FILM COATED ORAL
Qty: 9 TABLET | Refills: 0 | Status: SHIPPED | OUTPATIENT
Start: 2024-09-14 | End: 2024-09-17

## 2024-09-14 RX ADMIN — Medication 2000 MG: at 02:49

## 2024-09-14 RX ADMIN — KETOROLAC TROMETHAMINE 15 MG: 30 INJECTION INTRAMUSCULAR; INTRAVENOUS at 14:08

## 2024-09-14 RX ADMIN — ENOXAPARIN SODIUM 30 MG: 100 INJECTION SUBCUTANEOUS at 09:01

## 2024-09-14 RX ADMIN — HYDROCODONE BITARTRATE AND ACETAMINOPHEN 1 TABLET: 5; 325 TABLET ORAL at 10:34

## 2024-09-14 RX ADMIN — TAMSULOSIN HYDROCHLORIDE 0.4 MG: 0.4 CAPSULE ORAL at 08:59

## 2024-09-14 RX ADMIN — PHENAZOPYRIDINE 200 MG: 100 TABLET ORAL at 08:59

## 2024-09-14 RX ADMIN — ESCITALOPRAM OXALATE 10 MG: 10 TABLET ORAL at 08:59

## 2024-09-14 RX ADMIN — KETOROLAC TROMETHAMINE 15 MG: 30 INJECTION, SOLUTION INTRAMUSCULAR at 09:44

## 2024-09-14 RX ADMIN — CEPHALEXIN 500 MG: 250 CAPSULE ORAL at 08:59

## 2024-09-14 RX ADMIN — HYDROMORPHONE HYDROCHLORIDE 0.5 MG: 1 INJECTION, SOLUTION INTRAMUSCULAR; INTRAVENOUS; SUBCUTANEOUS at 02:52

## 2024-09-14 RX ADMIN — Medication 10 ML: at 09:01

## 2024-09-14 RX ADMIN — PHENAZOPYRIDINE 200 MG: 100 TABLET ORAL at 14:10

## 2024-09-14 RX ADMIN — HYDROCODONE BITARTRATE AND ACETAMINOPHEN 1 TABLET: 5; 325 TABLET ORAL at 08:59

## 2024-09-14 ASSESSMENT — PAIN SCALES - GENERAL
PAINLEVEL_OUTOF10: 7
PAINLEVEL_OUTOF10: 4
PAINLEVEL_OUTOF10: 4

## 2024-09-14 ASSESSMENT — PAIN DESCRIPTION - LOCATION: LOCATION: ABDOMEN

## 2024-09-14 ASSESSMENT — PAIN DESCRIPTION - DESCRIPTORS: DESCRIPTORS: SPASM;ACHING

## 2024-09-19 LAB
APPEARANCE STONE: NORMAL
COMPN STONE: NORMAL
SPECIMEN WT: 24 MG

## 2024-12-22 DIAGNOSIS — F41.1 GAD (GENERALIZED ANXIETY DISORDER): ICD-10-CM

## 2024-12-23 RX ORDER — ESCITALOPRAM OXALATE 10 MG/1
10 TABLET ORAL DAILY
Qty: 90 TABLET | Refills: 1 | Status: SHIPPED | OUTPATIENT
Start: 2024-12-23

## 2024-12-23 NOTE — TELEPHONE ENCOUNTER
LAST REFILL 06/12/2024  AMOUNT 90     1 REFILLS  LAST VISIT 3/22/2024  NEXT VISIT No future appointments.

## 2025-02-12 ENCOUNTER — OFFICE VISIT (OUTPATIENT)
Dept: INTERNAL MEDICINE CLINIC | Age: 40
End: 2025-02-12

## 2025-02-12 VITALS
HEIGHT: 67 IN | OXYGEN SATURATION: 96 % | TEMPERATURE: 98.2 F | HEART RATE: 80 BPM | DIASTOLIC BLOOD PRESSURE: 93 MMHG | SYSTOLIC BLOOD PRESSURE: 129 MMHG | BODY MASS INDEX: 45.99 KG/M2 | WEIGHT: 293 LBS

## 2025-02-12 DIAGNOSIS — J02.9 PHARYNGITIS, UNSPECIFIED ETIOLOGY: ICD-10-CM

## 2025-02-12 DIAGNOSIS — J03.90 TONSILLITIS: ICD-10-CM

## 2025-02-12 DIAGNOSIS — B30.9 ACUTE VIRAL CONJUNCTIVITIS OF LEFT EYE: ICD-10-CM

## 2025-02-12 DIAGNOSIS — R68.89 FLU-LIKE SYMPTOMS: Primary | ICD-10-CM

## 2025-02-12 LAB
INFLUENZA A ANTIBODY: NEGATIVE
INFLUENZA B ANTIBODY: NEGATIVE
S PYO AG THROAT QL: NORMAL

## 2025-02-12 ASSESSMENT — PATIENT HEALTH QUESTIONNAIRE - PHQ9
SUM OF ALL RESPONSES TO PHQ9 QUESTIONS 1 & 2: 0
SUM OF ALL RESPONSES TO PHQ QUESTIONS 1-9: 0
SUM OF ALL RESPONSES TO PHQ QUESTIONS 1-9: 0
2. FEELING DOWN, DEPRESSED OR HOPELESS: NOT AT ALL
10. IF YOU CHECKED OFF ANY PROBLEMS, HOW DIFFICULT HAVE THESE PROBLEMS MADE IT FOR YOU TO DO YOUR WORK, TAKE CARE OF THINGS AT HOME, OR GET ALONG WITH OTHER PEOPLE: NOT DIFFICULT AT ALL
9. THOUGHTS THAT YOU WOULD BE BETTER OFF DEAD, OR OF HURTING YOURSELF: NOT AT ALL
4. FEELING TIRED OR HAVING LITTLE ENERGY: NOT AT ALL
8. MOVING OR SPEAKING SO SLOWLY THAT OTHER PEOPLE COULD HAVE NOTICED. OR THE OPPOSITE, BEING SO FIGETY OR RESTLESS THAT YOU HAVE BEEN MOVING AROUND A LOT MORE THAN USUAL: NOT AT ALL
6. FEELING BAD ABOUT YOURSELF - OR THAT YOU ARE A FAILURE OR HAVE LET YOURSELF OR YOUR FAMILY DOWN: NOT AT ALL
7. TROUBLE CONCENTRATING ON THINGS, SUCH AS READING THE NEWSPAPER OR WATCHING TELEVISION: NOT AT ALL
SUM OF ALL RESPONSES TO PHQ QUESTIONS 1-9: 0
3. TROUBLE FALLING OR STAYING ASLEEP: NOT AT ALL
SUM OF ALL RESPONSES TO PHQ QUESTIONS 1-9: 0
5. POOR APPETITE OR OVEREATING: NOT AT ALL
1. LITTLE INTEREST OR PLEASURE IN DOING THINGS: NOT AT ALL

## 2025-02-12 NOTE — PROGRESS NOTES
[place]   2025    Vanesa Becker (:  1985) is a 39 y.o. female, here for evaluation of the following medical concerns:    Chief Complaint   Patient presents with    Pharyngitis    Generalized Body Aches    Chills    Cough    Chest Congestion    Eye Drainage     left        ASSESSMENT/ PLAN  1. Flu-like symptoms  - POCT Influenza A/B  2. Pharyngitis, unspecified etiology  - POCT rapid strep A  3. Acute viral conjunctivitis of left eye  4. Tonsillitis  -Flu and strep test negative in clinic today.  Does have bilateral enlarged tonsils without any pus point.  Also noted conjunctivitis of left eye.  Discussed with patient viral prodrome.  Can take Cepacol to soothe the throat.  She denies any fevers, shortness of breath.  Discussed if symptoms worsen should call us can consider a course of antibiotics at that time.  For now I have recommended that she continue on liberal hydration, rest, can take over-the-counter decongestants.      HPI  Patient is a 39-year-old female presenting with pain in her throat and redness in her left eye.  Symptoms have been persistent for over the last 3 days.  She denies any shortness of breath or fevers but endorses a dry cough.  She has also noticed redness in her left eye from today but denies any pain during movement of the eye or discharge.  She has noted some difficulty when swallowing due to soreness in her throat    ROS    CONSTITUTIONAL:  No fevers, chills, sweats or weight changes  EYES:  No redness or visual symptoms.  EARS, NOSE AND THROAT: Endorses red left eye, denies any discharge or pain   CARDIOVASCULAR:  No chest pains, palpitations, orthopnea or paroxysmal noctunal dyspnea.  RESPIRATORY: Endorses dry cough, denies any dyspnea on exertion  GI:  No nausea, vomiting, diarrhea, constipation, abdominal pain, hematochezia or melena.    :  No dysuria, urinary hesitancy or dribbling.  No nocturia or urinary frequency.  No abnormal urethral

## 2025-02-14 ENCOUNTER — PATIENT MESSAGE (OUTPATIENT)
Dept: INTERNAL MEDICINE CLINIC | Age: 40
End: 2025-02-14

## 2025-02-14 DIAGNOSIS — J02.9 PHARYNGITIS, UNSPECIFIED ETIOLOGY: Primary | ICD-10-CM

## 2025-02-14 DIAGNOSIS — H10.9 CONJUNCTIVITIS OF BOTH EYES, UNSPECIFIED CONJUNCTIVITIS TYPE: ICD-10-CM

## 2025-02-14 RX ORDER — POLYMYXIN B SULFATE AND TRIMETHOPRIM 1; 10000 MG/ML; [USP'U]/ML
1 SOLUTION OPHTHALMIC EVERY 4 HOURS
Qty: 3 ML | Refills: 0 | Status: SHIPPED | OUTPATIENT
Start: 2025-02-14 | End: 2025-02-24

## 2025-08-29 ENCOUNTER — APPOINTMENT (OUTPATIENT)
Dept: CT IMAGING | Age: 40
End: 2025-08-29
Payer: COMMERCIAL

## 2025-08-29 ENCOUNTER — HOSPITAL ENCOUNTER (EMERGENCY)
Age: 40
Discharge: HOME | End: 2025-08-29
Payer: COMMERCIAL

## 2025-08-29 VITALS
OXYGEN SATURATION: 100 % | HEIGHT: 67 IN | BODY MASS INDEX: 45.99 KG/M2 | TEMPERATURE: 97.8 F | SYSTOLIC BLOOD PRESSURE: 152 MMHG | RESPIRATION RATE: 18 BRPM | HEART RATE: 81 BPM | DIASTOLIC BLOOD PRESSURE: 92 MMHG | WEIGHT: 293 LBS

## 2025-08-29 DIAGNOSIS — R10.9 FLANK PAIN: Primary | ICD-10-CM

## 2025-08-29 DIAGNOSIS — N20.0 KIDNEY STONE: ICD-10-CM

## 2025-08-29 LAB
ALBUMIN SERPL-MCNC: 4.2 G/DL (ref 3.4–5)
ALBUMIN/GLOB SERPL: 1.7 {RATIO} (ref 1.1–2.2)
ALP SERPL-CCNC: 70 U/L (ref 40–129)
ALT SERPL-CCNC: 11 U/L (ref 10–40)
ANION GAP SERPL CALCULATED.3IONS-SCNC: 10 MMOL/L (ref 3–16)
AST SERPL-CCNC: 17 U/L (ref 15–37)
BASOPHILS # BLD: 0.1 K/UL (ref 0–0.2)
BASOPHILS NFR BLD: 1 %
BILIRUB SERPL-MCNC: 0.3 MG/DL (ref 0–1)
BILIRUB UR QL STRIP.AUTO: NEGATIVE
BUN SERPL-MCNC: 10 MG/DL (ref 7–20)
CALCIUM SERPL-MCNC: 9 MG/DL (ref 8.3–10.6)
CHLORIDE SERPL-SCNC: 105 MMOL/L (ref 99–110)
CLARITY UR: CLEAR
CO2 SERPL-SCNC: 24 MMOL/L (ref 21–32)
COLOR UR: YELLOW
CREAT SERPL-MCNC: 0.8 MG/DL (ref 0.6–1.1)
DEPRECATED RDW RBC AUTO: 13.9 % (ref 12.4–15.4)
EOSINOPHIL # BLD: 0.6 K/UL (ref 0–0.6)
EOSINOPHIL NFR BLD: 6.8 %
EPI CELLS #/AREA URNS HPF: ABNORMAL /HPF (ref 0–5)
GFR SERPLBLD CREATININE-BSD FMLA CKD-EPI: >90 ML/MIN/{1.73_M2}
GLUCOSE SERPL-MCNC: 94 MG/DL (ref 70–99)
GLUCOSE UR STRIP.AUTO-MCNC: NEGATIVE MG/DL
HCG UR QL: NEGATIVE
HCT VFR BLD AUTO: 39.2 % (ref 36–48)
HGB BLD-MCNC: 13.3 G/DL (ref 12–16)
HGB UR QL STRIP.AUTO: ABNORMAL
KETONES UR STRIP.AUTO-MCNC: NEGATIVE MG/DL
LEUKOCYTE ESTERASE UR QL STRIP.AUTO: ABNORMAL
LIPASE SERPL-CCNC: 32 U/L (ref 13–60)
LYMPHOCYTES # BLD: 2.7 K/UL (ref 1–5.1)
LYMPHOCYTES NFR BLD: 32.2 %
MCH RBC QN AUTO: 28 PG (ref 26–34)
MCHC RBC AUTO-ENTMCNC: 34 G/DL (ref 31–36)
MCV RBC AUTO: 82.3 FL (ref 80–100)
MONOCYTES # BLD: 0.4 K/UL (ref 0–1.3)
MONOCYTES NFR BLD: 4.7 %
NEUTROPHILS # BLD: 4.6 K/UL (ref 1.7–7.7)
NEUTROPHILS NFR BLD: 55.3 %
NITRITE UR QL STRIP.AUTO: NEGATIVE
PH UR STRIP.AUTO: 6 [PH] (ref 5–8)
PLATELET # BLD AUTO: 220 K/UL (ref 135–450)
PMV BLD AUTO: 7.4 FL (ref 5–10.5)
POTASSIUM SERPL-SCNC: 4.2 MMOL/L (ref 3.5–5.1)
PROT SERPL-MCNC: 6.7 G/DL (ref 6.4–8.2)
PROT UR STRIP.AUTO-MCNC: NEGATIVE MG/DL
RBC # BLD AUTO: 4.76 M/UL (ref 4–5.2)
RBC #/AREA URNS HPF: ABNORMAL /HPF (ref 0–4)
RENAL EPI CELLS #/AREA UR COMP ASSIST: ABNORMAL /HPF (ref 0–1)
SODIUM SERPL-SCNC: 139 MMOL/L (ref 136–145)
SP GR UR STRIP.AUTO: 1.02 (ref 1–1.03)
UA COMPLETE W REFLEX CULTURE PNL UR: ABNORMAL
UA DIPSTICK W REFLEX MICRO PNL UR: YES
URN SPEC COLLECT METH UR: ABNORMAL
UROBILINOGEN UR STRIP-ACNC: 0.2 E.U./DL
WBC # BLD AUTO: 8.4 K/UL (ref 4–11)
WBC #/AREA URNS HPF: ABNORMAL /HPF (ref 0–5)

## 2025-08-29 PROCEDURE — 96374 THER/PROPH/DIAG INJ IV PUSH: CPT

## 2025-08-29 PROCEDURE — 85025 COMPLETE CBC W/AUTO DIFF WBC: CPT

## 2025-08-29 PROCEDURE — 83690 ASSAY OF LIPASE: CPT

## 2025-08-29 PROCEDURE — 84703 CHORIONIC GONADOTROPIN ASSAY: CPT

## 2025-08-29 PROCEDURE — 6370000000 HC RX 637 (ALT 250 FOR IP): Performed by: PHYSICIAN ASSISTANT

## 2025-08-29 PROCEDURE — 96376 TX/PRO/DX INJ SAME DRUG ADON: CPT

## 2025-08-29 PROCEDURE — 81001 URINALYSIS AUTO W/SCOPE: CPT

## 2025-08-29 PROCEDURE — 80053 COMPREHEN METABOLIC PANEL: CPT

## 2025-08-29 PROCEDURE — 99284 EMERGENCY DEPT VISIT MOD MDM: CPT

## 2025-08-29 PROCEDURE — 74176 CT ABD & PELVIS W/O CONTRAST: CPT

## 2025-08-29 PROCEDURE — 6360000002 HC RX W HCPCS: Performed by: PHYSICIAN ASSISTANT

## 2025-08-29 RX ORDER — KETOROLAC TROMETHAMINE 30 MG/ML
15 INJECTION, SOLUTION INTRAMUSCULAR; INTRAVENOUS ONCE
Status: COMPLETED | OUTPATIENT
Start: 2025-08-29 | End: 2025-08-29

## 2025-08-29 RX ORDER — TAMSULOSIN HYDROCHLORIDE 0.4 MG/1
0.4 CAPSULE ORAL DAILY
Qty: 5 CAPSULE | Refills: 0 | Status: SHIPPED | OUTPATIENT
Start: 2025-08-29 | End: 2025-09-03

## 2025-08-29 RX ORDER — KETOROLAC TROMETHAMINE 10 MG/1
10 TABLET, FILM COATED ORAL EVERY 6 HOURS PRN
Qty: 20 TABLET | Refills: 0 | Status: SHIPPED | OUTPATIENT
Start: 2025-08-29 | End: 2025-09-03

## 2025-08-29 RX ORDER — OXYCODONE AND ACETAMINOPHEN 5; 325 MG/1; MG/1
1 TABLET ORAL EVERY 6 HOURS PRN
Qty: 12 TABLET | Refills: 0 | Status: SHIPPED | OUTPATIENT
Start: 2025-08-29 | End: 2025-09-01

## 2025-08-29 RX ORDER — OXYCODONE AND ACETAMINOPHEN 7.5; 325 MG/1; MG/1
1 TABLET ORAL ONCE
Refills: 0 | Status: COMPLETED | OUTPATIENT
Start: 2025-08-29 | End: 2025-08-29

## 2025-08-29 RX ORDER — ONDANSETRON 4 MG/1
4 TABLET, ORALLY DISINTEGRATING ORAL EVERY 8 HOURS PRN
Qty: 20 TABLET | Refills: 0 | Status: SHIPPED | OUTPATIENT
Start: 2025-08-29 | End: 2025-09-05

## 2025-08-29 RX ADMIN — OXYCODONE HYDROCHLORIDE AND ACETAMINOPHEN 1 TABLET: 7.5; 325 TABLET ORAL at 20:39

## 2025-08-29 RX ADMIN — KETOROLAC TROMETHAMINE 15 MG: 30 INJECTION, SOLUTION INTRAMUSCULAR at 16:42

## 2025-08-29 RX ADMIN — KETOROLAC TROMETHAMINE 15 MG: 30 INJECTION, SOLUTION INTRAMUSCULAR; INTRAVENOUS at 19:24

## 2025-08-29 ASSESSMENT — PAIN - FUNCTIONAL ASSESSMENT: PAIN_FUNCTIONAL_ASSESSMENT: 0-10

## 2025-08-29 ASSESSMENT — PAIN SCALES - GENERAL: PAINLEVEL_OUTOF10: 7

## 2025-08-29 ASSESSMENT — PAIN DESCRIPTION - LOCATION: LOCATION: FLANK

## 2025-08-29 ASSESSMENT — PAIN DESCRIPTION - ORIENTATION: ORIENTATION: RIGHT

## 2025-09-03 DIAGNOSIS — F41.1 GAD (GENERALIZED ANXIETY DISORDER): ICD-10-CM

## 2025-09-03 RX ORDER — ESCITALOPRAM OXALATE 10 MG/1
10 TABLET ORAL DAILY
Qty: 90 TABLET | Refills: 1 | Status: SHIPPED | OUTPATIENT
Start: 2025-09-03

## (undated) DEVICE — SUTURE VCRL SZ 1 L36IN ABSRB VLT L36MM CT-1 1/2 CIR J347H

## (undated) DEVICE — DRESSING COMP IS W4XL10IN PD W2XL8IN CNTCT LAYR ADH

## (undated) DEVICE — BAG DRNGE COMB PK

## (undated) DEVICE — ELECTRODE,ECG,STRESS,FOAM,3PK: Brand: MEDLINE

## (undated) DEVICE — KIT INF CTRL 2OZ LUB TBNG L12FT DBL END BRSH SYR OP4

## (undated) DEVICE — SOLUTION IV IRRIG POUR BRL 0.9% SODIUM CHL 2F7124

## (undated) DEVICE — CANNULA NSL 13FT TUBE AD ETCO2 DIV SAMP M

## (undated) DEVICE — GUIDEWIRE UROLOGICAL STR STD 0.035 IN X150 CM (QTY = BOX OF 5)

## (undated) DEVICE — 3M™ STERI-STRIP™ COMPOUND BENZOIN TINCTURE 40 BAGS/CARTON 4 CARTONS/CASE C1544: Brand: 3M™ STERI-STRIP™

## (undated) DEVICE — SINGLE ACTION PUMPING SYSTEM

## (undated) DEVICE — PAD,NON-ADHERENT,3X8,STERILE,LF,1/PK: Brand: MEDLINE

## (undated) DEVICE — Device

## (undated) DEVICE — FORCEPS BX L240CM DIA2.4MM L NDL RAD JAW 4 133340

## (undated) DEVICE — BLADE CLIPPER GEN PURP NS

## (undated) DEVICE — CYSTO: Brand: MEDLINE INDUSTRIES, INC.

## (undated) DEVICE — GLOVE SURG SZ 65 THK91MIL LTX FREE SYN POLYISOPRENE

## (undated) DEVICE — GLOVE SURG SZ 6 THK91MIL LTX FREE SYN POLYISOPRENE ANTI

## (undated) DEVICE — SUTURE MCRYL SZ 3-0 L27IN ABSRB UD L60MM KS STR REV CUT Y523H

## (undated) DEVICE — SUTURE ABSORBABLE BRAIDED 2-0 CT-1 27 IN UD VICRYL J259H

## (undated) DEVICE — TRAY URIN CATH 16FR DRNGE BG STATLOK STBL DEV F SURSTP

## (undated) DEVICE — SOLUTION IRRIG 1000ML STRL H2O USP PLAS POUR BTL

## (undated) DEVICE — SUTURE VCRL SZ 3-0 L36IN ABSRB UD L36MM CT-1 1/2 CIR J944H

## (undated) DEVICE — SUTURE VCRL SZ 0 L36IN ABSRB UD L36MM CT-1 1/2 CIR J946H

## (undated) DEVICE — GARMENT COMPR L FOR 23IN CALF FLOTRN

## (undated) DEVICE — SOLUTION IRRIG 2000ML STRL H2O UROMATIC PLAS CONT USP

## (undated) DEVICE — STERILE POLYISOPRENE POWDER-FREE SURGICAL GLOVES: Brand: PROTEXIS

## (undated) DEVICE — NITINOL STONE RETRIEVAL BASKET: Brand: ZERO TIP

## (undated) DEVICE — S/USE RESUS KIT W/O MASK (10): Brand: FISHER & PAYKEL HEALTHCARE

## (undated) DEVICE — CHLORAPREP 26ML ORANGE

## (undated) DEVICE — Z DISCONTINUED USE 2276105 GOWN PROTCT UNIV CHST W28IN L49IN SL 24IN BLU SPUNBOND FLM